# Patient Record
Sex: MALE | Race: BLACK OR AFRICAN AMERICAN | NOT HISPANIC OR LATINO | Employment: OTHER | URBAN - METROPOLITAN AREA
[De-identification: names, ages, dates, MRNs, and addresses within clinical notes are randomized per-mention and may not be internally consistent; named-entity substitution may affect disease eponyms.]

---

## 2019-05-08 PROBLEM — I73.9 PAD (PERIPHERAL ARTERY DISEASE): Status: ACTIVE | Noted: 2019-05-08

## 2019-05-08 PROBLEM — I10 ESSENTIAL HYPERTENSION: Status: ACTIVE | Noted: 2019-05-08

## 2019-05-08 PROBLEM — N40.1 BENIGN LOCALIZED PROSTATIC HYPERPLASIA WITH LOWER URINARY TRACT SYMPTOMS (LUTS): Status: ACTIVE | Noted: 2019-05-08

## 2019-05-08 PROBLEM — I25.10 CORONARY ARTERY DISEASE INVOLVING NATIVE HEART: Status: ACTIVE | Noted: 2019-05-08

## 2019-05-08 PROBLEM — N52.9 ERECTILE DYSFUNCTION: Status: ACTIVE | Noted: 2019-05-08

## 2019-05-08 PROBLEM — F17.210 CIGARETTE SMOKER: Status: ACTIVE | Noted: 2019-05-08

## 2019-05-08 PROBLEM — E78.5 HYPERLIPIDEMIA: Status: ACTIVE | Noted: 2019-05-08

## 2019-05-08 PROBLEM — Z95.1 HISTORY OF CORONARY ARTERY BYPASS GRAFT: Status: ACTIVE | Noted: 2019-05-08

## 2019-11-05 ENCOUNTER — OFFICE VISIT (OUTPATIENT)
Dept: FAMILY MEDICINE | Facility: CLINIC | Age: 69
End: 2019-11-05
Payer: MEDICARE

## 2019-11-05 ENCOUNTER — HOSPITAL ENCOUNTER (OUTPATIENT)
Dept: RADIOLOGY | Facility: HOSPITAL | Age: 69
Discharge: HOME | End: 2019-11-05
Attending: FAMILY MEDICINE
Payer: MEDICARE

## 2019-11-05 VITALS
DIASTOLIC BLOOD PRESSURE: 82 MMHG | HEIGHT: 64 IN | OXYGEN SATURATION: 96 % | SYSTOLIC BLOOD PRESSURE: 138 MMHG | BODY MASS INDEX: 32.93 KG/M2 | WEIGHT: 192.88 LBS | HEART RATE: 60 BPM

## 2019-11-05 DIAGNOSIS — M25.511 CHRONIC PAIN OF BOTH SHOULDERS: ICD-10-CM

## 2019-11-05 DIAGNOSIS — G89.29 CHRONIC PAIN OF BOTH SHOULDERS: ICD-10-CM

## 2019-11-05 DIAGNOSIS — I10 ESSENTIAL HYPERTENSION: Primary | ICD-10-CM

## 2019-11-05 DIAGNOSIS — Z11.4 SCREENING FOR HIV (HUMAN IMMUNODEFICIENCY VIRUS): ICD-10-CM

## 2019-11-05 DIAGNOSIS — I25.810 CORONARY ARTERY DISEASE INVOLVING CORONARY BYPASS GRAFT OF NATIVE HEART WITHOUT ANGINA PECTORIS: ICD-10-CM

## 2019-11-05 DIAGNOSIS — Z11.59 NEED FOR HEPATITIS C SCREENING TEST: ICD-10-CM

## 2019-11-05 DIAGNOSIS — F41.9 ANXIETY: ICD-10-CM

## 2019-11-05 DIAGNOSIS — R06.89 ABNORMAL BREATH SOUNDS: ICD-10-CM

## 2019-11-05 DIAGNOSIS — M25.512 CHRONIC PAIN OF BOTH SHOULDERS: ICD-10-CM

## 2019-11-05 DIAGNOSIS — E78.49 OTHER HYPERLIPIDEMIA: ICD-10-CM

## 2019-11-05 DIAGNOSIS — R60.0 LOCALIZED EDEMA: ICD-10-CM

## 2019-11-05 DIAGNOSIS — F17.200 TOBACCO DEPENDENCE: ICD-10-CM

## 2019-11-05 DIAGNOSIS — D50.8 OTHER IRON DEFICIENCY ANEMIA: ICD-10-CM

## 2019-11-05 DIAGNOSIS — E66.09 CLASS 1 OBESITY DUE TO EXCESS CALORIES WITH SERIOUS COMORBIDITY AND BODY MASS INDEX (BMI) OF 33.0 TO 33.9 IN ADULT: ICD-10-CM

## 2019-11-05 PROCEDURE — 3075F SYST BP GE 130 - 139MM HG: CPT | Mod: CPTII,S$GLB,, | Performed by: FAMILY MEDICINE

## 2019-11-05 PROCEDURE — 1101F PT FALLS ASSESS-DOCD LE1/YR: CPT | Mod: CPTII,S$GLB,, | Performed by: FAMILY MEDICINE

## 2019-11-05 PROCEDURE — 3079F PR MOST RECENT DIASTOLIC BLOOD PRESSURE 80-89 MM HG: ICD-10-PCS | Mod: CPTII,S$GLB,, | Performed by: FAMILY MEDICINE

## 2019-11-05 PROCEDURE — 99999 PR PBB SHADOW E&M-NEW PATIENT-LVL V: ICD-10-PCS | Mod: PBBFAC,,, | Performed by: FAMILY MEDICINE

## 2019-11-05 PROCEDURE — 3079F DIAST BP 80-89 MM HG: CPT | Mod: CPTII,S$GLB,, | Performed by: FAMILY MEDICINE

## 2019-11-05 PROCEDURE — 73030 X-RAY EXAM OF SHOULDER: CPT | Mod: TC,50,FY,PO

## 2019-11-05 PROCEDURE — 3075F PR MOST RECENT SYSTOLIC BLOOD PRESS GE 130-139MM HG: ICD-10-PCS | Mod: CPTII,S$GLB,, | Performed by: FAMILY MEDICINE

## 2019-11-05 PROCEDURE — 99204 PR OFFICE/OUTPT VISIT, NEW, LEVL IV, 45-59 MIN: ICD-10-PCS | Mod: S$GLB,,, | Performed by: FAMILY MEDICINE

## 2019-11-05 PROCEDURE — 71046 XR CHEST PA AND LATERAL: ICD-10-PCS | Mod: 26,,, | Performed by: RADIOLOGY

## 2019-11-05 PROCEDURE — 99999 PR PBB SHADOW E&M-NEW PATIENT-LVL V: CPT | Mod: PBBFAC,,, | Performed by: FAMILY MEDICINE

## 2019-11-05 PROCEDURE — 73030 X-RAY EXAM OF SHOULDER: CPT | Mod: 26,50,, | Performed by: RADIOLOGY

## 2019-11-05 PROCEDURE — 1101F PR PT FALLS ASSESS DOC 0-1 FALLS W/OUT INJ PAST YR: ICD-10-PCS | Mod: CPTII,S$GLB,, | Performed by: FAMILY MEDICINE

## 2019-11-05 PROCEDURE — 73030 XR SHOULDER COMPLETE 2 OR MORE VIEWS BILATERAL: ICD-10-PCS | Mod: 26,50,, | Performed by: RADIOLOGY

## 2019-11-05 PROCEDURE — 71046 X-RAY EXAM CHEST 2 VIEWS: CPT | Mod: TC,FY,PO

## 2019-11-05 PROCEDURE — 71046 X-RAY EXAM CHEST 2 VIEWS: CPT | Mod: 26,,, | Performed by: RADIOLOGY

## 2019-11-05 PROCEDURE — 99204 OFFICE O/P NEW MOD 45 MIN: CPT | Mod: S$GLB,,, | Performed by: FAMILY MEDICINE

## 2019-11-05 RX ORDER — TELMISARTAN 40 MG/1
40 TABLET ORAL DAILY
Qty: 90 TABLET | Refills: 3 | Status: SHIPPED | OUTPATIENT
Start: 2019-11-05 | End: 2020-04-24 | Stop reason: SDUPTHER

## 2019-11-05 RX ORDER — MONTELUKAST SODIUM 10 MG/1
10 TABLET ORAL NIGHTLY
Qty: 30 TABLET | Refills: 0 | Status: SHIPPED | OUTPATIENT
Start: 2019-11-05 | End: 2019-11-05 | Stop reason: SDUPTHER

## 2019-11-05 RX ORDER — ESCITALOPRAM OXALATE 10 MG/1
TABLET ORAL
COMMUNITY
Start: 2019-03-19 | End: 2019-12-31 | Stop reason: SDUPTHER

## 2019-11-05 NOTE — PATIENT INSTRUCTIONS
Eating Heart-Healthy Food: Using the DASH Plan    Eating for your heart doesnt have to be hard or boring. You just need to know how to make healthier choices. The DASH eating plan has been developed to help you do just that. DASH stands for Dietary Approaches to Stop Hypertension. It is a plan that has been proven to be healthier for your heart and to lower your risk for high blood pressure. It can also help lower your risk for cancer, heart disease, osteoporosis, and diabetes.  Choosing from each food group  Choose foods from each of the food groups below each day. Try to get the recommended number of servings for each food group. The serving numbers are based on a diet of 2,000 calories a day. Talk to your doctor if youre unsure about your calorie needs. Along with getting the correct servings, the DASH plan also recommends a sodium intake less than 2,300 mg per day.        Grains  Servings: 6 to 8 a day  A serving is:  · 1 slice bread  · 1 ounce dry cereal  · Half a cup cooked rice, pasta or cereal  Best choices: Whole grains and any grains high in fiber. Vegetables  Servings: 4 to 5 a day  A serving is:  · 1 cup raw leafy vegetable  · Half a cup cut-up raw or cooked vegetable  · Half a cup vegetable juice  Best choices: Fresh or frozen vegetables prepared without added salt or fat.   Fruits  Servings: 4 to 5 a day  A serving is:  · 1 medium fruit  · One-quarter cup dried fruit  · Half a cup fresh, frozen, or canned fruit  · Half a cup of 100% fruit juices  Best choices: A variety of fresh fruits of different colors. Whole fruits are a better choice than fruit juices. Low-fat or fat-free dairy  Servings: 2 to 3 a day  A serving is:  · 1 cup milk  · 1 cup yogurt  · One and a half ounces cheese  Best choices: Skim or 1% milk, low-fat or fat-free yogurt or buttermilk, and low-fat cheeses.         Lean meats, poultry, fish  Servings: 6 or fewer a day  A serving is:  · 1 ounce cooked meats, poultry, or fish  · 1  egg  Best choices: Lean poultry and fish. Trim away visible fat. Broil, grill, roast, or boil instead of frying. Remove skin from poultry before eating. Limit how much red meat you eat.  Nuts, seeds, beans  Servings: 4 to 5 a week  A serving is:  · One-third cup nuts (one and a half ounces)  · 2 tablespoons nut butter or seeds  · Half a cup cooked dry beans or legumes  Best choices: Dry roasted nuts with no salt added, lentils, kidney beans, garbanzo beans, and whole angela beans.   Fats and oils  Servings: 2 to 3 a day  A serving is:  · 1 teaspoon vegetable oil  · 1 teaspoon soft margarine  · 1 tablespoon mayonnaise  · 2 tablespoons salad dressing  Best choices: Nut and vegetable oils (nontropical vegetable oils), such as olive and canola oil. Sweets  Servings: 5 a week or fewer  A serving is:  · 1 tablespoon sugar, maple syrup, or honey  · 1 tablespoon jam or jelly  · 1 half-ounce jelly beans (about 15)  · 1 cup lemonade  Best choices: Dried fruit can be a satisfying sweet. Choose low-fat sweets. And watch your serving sizes!      For more on the DASH eating plan, visit:  www.nhlbi.nih.gov/health/health-topics/topics/dash   Date Last Reviewed: 6/1/2016  © 8345-0478 [employer]. [address] [place] [address], [place] [address]. All rights reserved. This information is not intended as a substitute for professional medical care. Always follow your healthcare professional's instructions.

## 2019-11-06 DIAGNOSIS — M25.512 CHRONIC LEFT SHOULDER PAIN: Primary | ICD-10-CM

## 2019-11-06 DIAGNOSIS — G89.29 CHRONIC LEFT SHOULDER PAIN: Primary | ICD-10-CM

## 2019-11-06 DIAGNOSIS — R76.8 HEPATITIS C ANTIBODY TEST POSITIVE: Primary | ICD-10-CM

## 2019-11-06 NOTE — PROGRESS NOTES
Refill Authorization Note     is requesting a refill authorization.    Brief assessment and rationale for refill: DEFER: new start  Name and strength of medication: montelukast (SINGULAIR) 10 mg tablet       Medication Therapy Plan: new start; defer to you    Medication reconciliation completed: No                         Comments:  Requested Prescriptions   Pending Prescriptions Disp Refills    montelukast (SINGULAIR) 10 mg tablet [Pharmacy Med Name: MONTELUKAST 10MG TABLETS] 90 tablet 0     Sig: TAKE 1 TABLET(10 MG) BY MOUTH EVERY EVENING       Pulmonology:  Leukotriene Inhibitors Failed - 11/5/2019  4:21 PM        Failed - An appropriate indication is on the problem list     Allergic Rhinitis  Sinusitis  Seasonal Allergies   Asthma              Passed - Patient is at least 18 years old        Passed - Office visit in past 12 months or future 90 days     Recent Outpatient Visits            Yesterday Essential hypertension    [facility] Medicine Kalani Velázquez MD    1 month ago Essential hypertension    [facility] Goldy Wade MD    5 months ago Pain in both lower extremities    [facility] - Cardiology Rmoeo Zhu MD    6 months ago Essential hypertension    [facility] Goldy Wade MD          Future Appointments              In 1 week [facility]    In 2 weeks Teagan Rivera MD [facility], [facility]    In 3 months Kalani Velázquez MD [facility]

## 2019-11-07 ENCOUNTER — TELEPHONE (OUTPATIENT)
Dept: FAMILY MEDICINE | Facility: CLINIC | Age: 69
End: 2019-11-07

## 2019-11-07 RX ORDER — MONTELUKAST SODIUM 10 MG/1
TABLET ORAL
Qty: 90 TABLET | Refills: 0 | Status: SHIPPED | OUTPATIENT
Start: 2019-11-07 | End: 2020-06-08 | Stop reason: SDUPTHER

## 2019-11-07 RX ORDER — HYDROCODONE BITARTRATE AND ACETAMINOPHEN 5; 325 MG/1; MG/1
1 TABLET ORAL EVERY 8 HOURS PRN
Qty: 21 TABLET | Refills: 0 | Status: SHIPPED | OUTPATIENT
Start: 2019-11-07 | End: 2019-12-31 | Stop reason: SDUPTHER

## 2019-11-07 NOTE — TELEPHONE ENCOUNTER
I faxed prescription to the pharmacy for short-term hydrocodone, I only can prescribe for 7 days, make sure to follow up with the specialist on 11/13/2019 for the shoulder.  Thank you

## 2019-11-07 NOTE — TELEPHONE ENCOUNTER
The hepatitis C results are not confirmatory until we get the 2nd test.  Can he come back for the blood work tomorrow.  Yes,  I can fax a prescription to the pharmacy for hydrocodone for pain, also send the patient to see the specialist for that.  Can you ask if he take hydrocodone before and if he had any problems taking it?  Thank you

## 2019-11-07 NOTE — TELEPHONE ENCOUNTER
----- Message from Mila Vergara sent at 11/7/2019  1:58 PM CST -----  Type: Needs Medical Advice    Who Called:  Patient   Best Call Back Number: [phone]  Additional Information: patient is concerned about his medications reacting with each other, telmisartan (MICARDIS) 40 MG Tab, please contact to advise.     Thank you

## 2019-11-07 NOTE — TELEPHONE ENCOUNTER
Spoke to pt. Pt states he is very upset about his hepatitis C results. Pt states he feels like the world is down to get him, states that he doesn't do drugs or have sex and states that he doesn't know how he is hepatitis C. Pt states he came in for back and shoulder pain and states he is in a lot of pain and would like a prescription for pain. Please advise.

## 2019-11-07 NOTE — TELEPHONE ENCOUNTER
Please see the following assessment. This refill request still requires some action on your part. Montelukast was recently initiated. Pended 90-day supply. Defer to you. Thank you.

## 2019-11-07 NOTE — TELEPHONE ENCOUNTER
The hepatitis C was positive, we have make sure that this is not a false result, I place orders for quantification of the virus, the orders are in place, the patient is to return to the office to check on that.  Thank you

## 2019-11-07 NOTE — TELEPHONE ENCOUNTER
Contacted patient and informed that results are not concrete until 2nd test is done. Patient will complete labs on 11/13 as he has other appts on that day. Patient states he has no known issues with taking hydrocodone and would like it sent to his pharmacy.

## 2019-11-07 NOTE — TELEPHONE ENCOUNTER
----- Message from Tricia Reilly sent at 11/7/2019  8:59 AM CST -----  Contact: patient  Type: Needs Medical Advice    Who Called:  patient  Best Call Back Number: [phone]  Additional Information: patient is very upset because he received a call saying that he has Hepatitis C. He states that he never had anything like that. Please give call back.

## 2019-11-08 NOTE — PROGRESS NOTES
Subjective:       Patient ID: Alexis Medrano Jr. is a 69 y.o. male.    Chief Complaint: Establish Care (arthritis, ringing in ears)    Hypertension   This is a chronic problem. The current episode started more than 1 year ago. The problem is unchanged. The problem is controlled. Associated symptoms include anxiety and peripheral edema. Pertinent negatives include no blurred vision, chest pain, headaches, malaise/fatigue, neck pain, orthopnea, palpitations, PND, shortness of breath or sweats. There are no associated agents to hypertension. Risk factors for coronary artery disease include dyslipidemia, male gender, obesity, sedentary lifestyle and smoking/tobacco exposure. Past treatments include nothing. The current treatment provides no improvement. Compliance problems include diet and exercise.  Hypertensive end-organ damage includes CAD/MI. There is no history of angina, kidney disease, CVA, heart failure, left ventricular hypertrophy or retinopathy. There is no history of chronic renal disease.   Hyperlipidemia   This is a chronic problem. The current episode started more than 1 year ago. The problem is uncontrolled. Recent lipid tests were reviewed and are high. Exacerbating diseases include obesity. He has no history of chronic renal disease, diabetes, hypothyroidism, liver disease or nephrotic syndrome. Factors aggravating his hyperlipidemia include fatty foods. Pertinent negatives include no chest pain, focal weakness, leg pain, myalgias or shortness of breath. Current antihyperlipidemic treatment includes statins. The current treatment provides moderate improvement of lipids. Compliance problems include adherence to diet and adherence to exercise.  Risk factors for coronary artery disease include hypertension, male sex, dyslipidemia, family history, a sedentary lifestyle, stress and obesity.   Shoulder Pain    Pain location: Bilateral shoulders. This is a chronic problem. The current episode started more than 1  year ago. The problem occurs constantly. The problem has been gradually worsening. The pain is severe. Associated symptoms include a limited range of motion and stiffness. Pertinent negatives include no fever, headaches, inability to bear weight, itching, joint locking, joint swelling, numbness, tingling or visual symptoms. The symptoms are aggravated by activity. He has tried acetaminophen and NSAIDS for the symptoms. The treatment provided mild relief. There is no history of diabetes.     The patient also has anxiety which he takes Lexapro for, he denies any side effects of the medication, stated that in the past he was taking diazepam with good relief of the symptoms but he was taking off of this medication.  Also he was taking hydrocodone for pain but he stop taking this medication also.  Upon review of the last blood work, the patient has anemia.  The patient also takes iron tablets daily.  The patient smokes daily, he does want to quit smoking.    Past medical history, past social history, past Family history, past surgical history was reviewed and discussed with the patient.    Review of Systems   Constitutional: Negative for activity change, appetite change, chills, fever and malaise/fatigue.   HENT: Positive for congestion and postnasal drip. Negative for ear discharge.    Eyes: Negative for blurred vision, discharge and itching.   Respiratory: Negative for choking, chest tightness and shortness of breath.    Cardiovascular: Positive for leg swelling. Negative for chest pain, palpitations, orthopnea and PND.   Gastrointestinal: Negative for abdominal distention and abdominal pain.   Endocrine: Negative for cold intolerance and heat intolerance.   Genitourinary: Negative for dysuria and flank pain.   Musculoskeletal: Positive for arthralgias and stiffness. Negative for back pain, myalgias and neck pain.   Skin: Negative for itching, pallor and rash.   Allergic/Immunologic: Negative for environmental  allergies and food allergies.   Neurological: Negative for dizziness, tingling, focal weakness, facial asymmetry, numbness and headaches.   Hematological: Negative for adenopathy. Does not bruise/bleed easily.   Psychiatric/Behavioral: Positive for dysphoric mood. Negative for agitation, confusion, decreased concentration and sleep disturbance. The patient is nervous/anxious.        Objective:      Physical Exam   Constitutional: He appears well-developed and well-nourished. No distress.   HENT:   Head: Normocephalic and atraumatic.   Right Ear: External ear normal.   Left Ear: External ear normal.   Nose: Nose normal.   Mouth/Throat: No oropharyngeal exudate.   Eyes: Pupils are equal, round, and reactive to light. Left eye exhibits no discharge. No scleral icterus.   Neck: Normal range of motion. Neck supple. No tracheal deviation present. No thyromegaly present.   Cardiovascular: Normal rate, regular rhythm and normal heart sounds. Exam reveals no friction rub.   No murmur heard.  Pulmonary/Chest: Effort normal. No respiratory distress. He has no wheezes. He has rales (On bilateral lower bases).   Abdominal: Soft. Bowel sounds are normal. There is no tenderness. There is no guarding.   Musculoskeletal: He exhibits tenderness (Tenderness to palpation on bilateral shoulders with decreased range of motion). He exhibits no edema.   Neurological: No cranial nerve deficit. Coordination normal.   Skin: Skin is warm and dry. No rash noted. He is not diaphoretic. No erythema. No pallor.   Psychiatric: He has a normal mood and affect. His behavior is normal. Judgment and thought content normal.   Nursing note and vitals reviewed.      Assessment:       1. Essential hypertension    2. Localized edema    3. Tobacco dependence    4. Other iron deficiency anemia    5. Need for hepatitis C screening test    6. Other hyperlipidemia    7. Coronary artery disease involving coronary bypass graft of native heart without angina  pectoris    8. Chronic pain of both shoulders    9. Screening for HIV (human immunodeficiency virus)    10. Class 1 obesity due to excess calories with serious comorbidity and body mass index (BMI) of 33.0 to 33.9 in adult    11. Abnormal breath sounds        Plan:       Essential hypertension:  Well controlled  -     Comprehensive metabolic panel; Future; Expected date: 11/05/2019  -     Lipid panel; Future; Expected date: 11/05/2019  -     telmisartan (MICARDIS) 40 MG Tab; Take 1 tablet (40 mg total) by mouth once daily.  Dispense: 90 tablet; Refill: 3    Localized edema:  New problem workup needed    Tobacco dependence:  Worsening  -     Ambulatory referral to Smoking Cessation Program    Other iron deficiency anemia:  New problem workup needed  -     CBC auto differential; Future; Expected date: 11/05/2019  -     Iron and TIBC; Future; Expected date: 11/05/2019  -     Ferritin; Future; Expected date: 11/05/2019    Need for hepatitis C screening test  -     Hepatitis C antibody; Future; Expected date: 11/05/2019    Other hyperlipidemia:  New problem workup needed    Coronary artery disease involving coronary bypass graft of native heart without angina pectoris:  New problem workup needed  -     Ambulatory referral to Cardiology    Chronic pain of both shoulders:  New problem, workup needed  -     X-Ray Shoulder Complete Bilateral; Future; Expected date: 11/05/2019  -     Sedimentation rate; Future; Expected date: 11/05/2019    Screening for HIV (human immunodeficiency virus)  -     HIV 1/2 Ag/Ab (4th Gen); Future; Expected date: 11/05/2019    Class 1 obesity due to excess calories with serious comorbidity and body mass index (BMI) of 33.0 to 33.9 in adult:  New problem, workup needed    Abnormal breath sounds:  New problem workup needed  -     X-Ray Chest PA And Lateral; Future; Expected date: 11/05/2019    Anxiety:  New problem next visit workup    Will call the patient after have the results of the test, the  patient was advised to lose weight, avoid fried foods, red meat and processed starches, eat more vegetables, drink plenty water.  I was recommended the patient against taking diazepam for anxiety because of the risk of addiction started g of the medication, continue with Lexapro.  Will refer the patient to the cardiologist for further assessment secondary to coronary artery disease and also to Dr. Roger for shoulder pain.  Hydrocodone was prescribed for the patient for 7 days.The patient's BMI has been recorded in the chart. The patient has been provided educational materials regarding the benefits of attaining and maintaining a normal weight. We will continue to address and follow this issue during follow up visits.Patient agreed with assessment and plan. Patient verbalized understanding.

## 2019-11-11 ENCOUNTER — TELEPHONE (OUTPATIENT)
Dept: FAMILY MEDICINE | Facility: CLINIC | Age: 69
End: 2019-11-11

## 2019-11-11 NOTE — TELEPHONE ENCOUNTER
Please see previous note and advise. Do not see any documentation about discontinuing any medications.

## 2019-11-11 NOTE — TELEPHONE ENCOUNTER
----- Message from Clotilde Cruz sent at 11/11/2019  3:36 PM CST -----  Contact: Opal Martin  Type: Needs Medical Advice    Who Called:  Opal Marie Call Back Number:     Additional Information: Requesting a call back from Nurse, regarding questions about which medications  wants pt to stop taking pt is confused,please call pharmacy back with advice

## 2019-11-12 ENCOUNTER — TELEPHONE (OUTPATIENT)
Dept: FAMILY MEDICINE | Facility: CLINIC | Age: 69
End: 2019-11-12

## 2019-11-12 NOTE — TELEPHONE ENCOUNTER
I started Micardis for his blood pressure, I stopped lisinopril because is link with lung cancer.  Thank you.

## 2019-11-12 NOTE — TELEPHONE ENCOUNTER
----- Message from Cornelia Hanna sent at 11/12/2019 10:41 AM CST -----  Contact: Ria  from [facility] pharmacy   Pharmacy need to speak to nurse regarding medication telmisartan (MICARDIS) 40 MG Tab    Pharmacy need to discuss patient concerns regarding  if patient  should be taking above medications with other medications he is currently on       [id number] [address] [facility]  [address]  [facility] [place] [address]  Phone: [phone] Fax: [phone]

## 2019-11-13 ENCOUNTER — INITIAL CONSULT (OUTPATIENT)
Dept: PHYSICAL MEDICINE AND REHAB | Facility: CLINIC | Age: 69
End: 2019-11-13
Payer: MEDICARE

## 2019-11-13 ENCOUNTER — OFFICE VISIT (OUTPATIENT)
Dept: UROLOGY | Facility: CLINIC | Age: 69
End: 2019-11-13
Payer: MEDICARE

## 2019-11-13 ENCOUNTER — LAB VISIT (OUTPATIENT)
Dept: LAB | Facility: HOSPITAL | Age: 69
End: 2019-11-13
Attending: FAMILY MEDICINE
Payer: MEDICARE

## 2019-11-13 VITALS
BODY MASS INDEX: 32.93 KG/M2 | WEIGHT: 192.88 LBS | SYSTOLIC BLOOD PRESSURE: 130 MMHG | HEART RATE: 61 BPM | DIASTOLIC BLOOD PRESSURE: 75 MMHG | HEIGHT: 64 IN | RESPIRATION RATE: 18 BRPM

## 2019-11-13 VITALS — BODY MASS INDEX: 32.78 KG/M2 | WEIGHT: 192 LBS | HEIGHT: 64 IN

## 2019-11-13 DIAGNOSIS — N52.9 IMPOTENCE: Primary | ICD-10-CM

## 2019-11-13 DIAGNOSIS — Z12.5 SCREENING FOR PROSTATE CANCER: ICD-10-CM

## 2019-11-13 DIAGNOSIS — R76.8 HEPATITIS C ANTIBODY TEST POSITIVE: ICD-10-CM

## 2019-11-13 DIAGNOSIS — N40.0 BPH WITHOUT URINARY OBSTRUCTION: ICD-10-CM

## 2019-11-13 DIAGNOSIS — M67.919 TENDINOPATHY OF ROTATOR CUFF, UNSPECIFIED LATERALITY: Primary | ICD-10-CM

## 2019-11-13 PROCEDURE — 99204 PR OFFICE/OUTPT VISIT, NEW, LEVL IV, 45-59 MIN: ICD-10-PCS | Mod: S$GLB,,, | Performed by: UROLOGY

## 2019-11-13 PROCEDURE — 87522 HEPATITIS C REVRS TRNSCRPJ: CPT

## 2019-11-13 PROCEDURE — 3074F SYST BP LT 130 MM HG: CPT | Mod: CPTII,S$GLB,, | Performed by: PHYSICAL MEDICINE & REHABILITATION

## 2019-11-13 PROCEDURE — 3078F DIAST BP <80 MM HG: CPT | Mod: CPTII,S$GLB,, | Performed by: UROLOGY

## 2019-11-13 PROCEDURE — 1101F PT FALLS ASSESS-DOCD LE1/YR: CPT | Mod: CPTII,S$GLB,, | Performed by: UROLOGY

## 2019-11-13 PROCEDURE — 1101F PR PT FALLS ASSESS DOC 0-1 FALLS W/OUT INJ PAST YR: ICD-10-PCS | Mod: CPTII,S$GLB,, | Performed by: PHYSICAL MEDICINE & REHABILITATION

## 2019-11-13 PROCEDURE — 99999 PR PBB SHADOW E&M-EST. PATIENT-LVL III: ICD-10-PCS | Mod: PBBFAC,,, | Performed by: UROLOGY

## 2019-11-13 PROCEDURE — 3074F PR MOST RECENT SYSTOLIC BLOOD PRESSURE < 130 MM HG: ICD-10-PCS | Mod: CPTII,S$GLB,, | Performed by: PHYSICAL MEDICINE & REHABILITATION

## 2019-11-13 PROCEDURE — 99204 OFFICE O/P NEW MOD 45 MIN: CPT | Mod: S$GLB,,, | Performed by: UROLOGY

## 2019-11-13 PROCEDURE — 36415 COLL VENOUS BLD VENIPUNCTURE: CPT | Mod: PO

## 2019-11-13 PROCEDURE — 1101F PT FALLS ASSESS-DOCD LE1/YR: CPT | Mod: CPTII,S$GLB,, | Performed by: PHYSICAL MEDICINE & REHABILITATION

## 2019-11-13 PROCEDURE — 99999 PR PBB SHADOW E&M-EST. PATIENT-LVL III: CPT | Mod: PBBFAC,,, | Performed by: UROLOGY

## 2019-11-13 PROCEDURE — 3078F DIAST BP <80 MM HG: CPT | Mod: CPTII,S$GLB,, | Performed by: PHYSICAL MEDICINE & REHABILITATION

## 2019-11-13 PROCEDURE — 99999 PR PBB SHADOW E&M-EST. PATIENT-LVL II: CPT | Mod: PBBFAC,,, | Performed by: PHYSICAL MEDICINE & REHABILITATION

## 2019-11-13 PROCEDURE — 99204 OFFICE O/P NEW MOD 45 MIN: CPT | Mod: S$GLB,,, | Performed by: PHYSICAL MEDICINE & REHABILITATION

## 2019-11-13 PROCEDURE — 1101F PR PT FALLS ASSESS DOC 0-1 FALLS W/OUT INJ PAST YR: ICD-10-PCS | Mod: CPTII,S$GLB,, | Performed by: UROLOGY

## 2019-11-13 PROCEDURE — 3078F PR MOST RECENT DIASTOLIC BLOOD PRESSURE < 80 MM HG: ICD-10-PCS | Mod: CPTII,S$GLB,, | Performed by: PHYSICAL MEDICINE & REHABILITATION

## 2019-11-13 PROCEDURE — 99999 PR PBB SHADOW E&M-EST. PATIENT-LVL II: ICD-10-PCS | Mod: PBBFAC,,, | Performed by: PHYSICAL MEDICINE & REHABILITATION

## 2019-11-13 PROCEDURE — 99204 PR OFFICE/OUTPT VISIT, NEW, LEVL IV, 45-59 MIN: ICD-10-PCS | Mod: S$GLB,,, | Performed by: PHYSICAL MEDICINE & REHABILITATION

## 2019-11-13 PROCEDURE — 3075F PR MOST RECENT SYSTOLIC BLOOD PRESS GE 130-139MM HG: ICD-10-PCS | Mod: CPTII,S$GLB,, | Performed by: UROLOGY

## 2019-11-13 PROCEDURE — 3078F PR MOST RECENT DIASTOLIC BLOOD PRESSURE < 80 MM HG: ICD-10-PCS | Mod: CPTII,S$GLB,, | Performed by: UROLOGY

## 2019-11-13 PROCEDURE — 3075F SYST BP GE 130 - 139MM HG: CPT | Mod: CPTII,S$GLB,, | Performed by: UROLOGY

## 2019-11-13 RX ORDER — SIMVASTATIN 40 MG/1
TABLET, FILM COATED ORAL
Refills: 6 | COMMUNITY
Start: 2019-10-29 | End: 2019-11-20 | Stop reason: ALTCHOICE

## 2019-11-13 RX ORDER — SILDENAFIL 100 MG/1
100 TABLET, FILM COATED ORAL DAILY PRN
Qty: 6 TABLET | Refills: 11 | Status: SHIPPED | OUTPATIENT
Start: 2019-11-13 | End: 2021-02-05 | Stop reason: SDUPTHER
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
## 2019-11-13 NOTE — PROGRESS NOTES
Subjective:       Patient ID: Alexis Medrano Jr. is a 69 y.o. male.    Chief Complaint: Sexual Dysfunction    HPI     69-year-old with ED.  This has been a long-term problem.  He has tried Viagra in the past.  He says it is ineffective.  He has also been on daily Cialis in the past which has also been ineffective.  He has no bothersome urinary symptoms.  He denies hematuria and dysuria.  He is taking finasteride and has for some time.  The details are unknown.  He has no family history of prostate cancer.  His last PSA was 0.3 ten years ago.  We discussed all treatment options available for erectile dysfunction.  I recommend he try Viagra again.    Component PSA, SCREEN   Latest Ref Rng & Units 0 - 4.0 ng/ml   8/6/2007 0.3   3/8/2005 0.5       Past Medical History:   Diagnosis Date    Depression     Hypertension      Past Surgical History:   Procedure Laterality Date    CORONARY ARTERY BYPASS GRAFT         Current Outpatient Medications:     aspirin (ECOTRIN) 81 MG EC tablet, Take 81 mg by mouth once daily., Disp: , Rfl:     atenolol (TENORMIN) 50 MG tablet, Take 50 mg by mouth once daily., Disp: , Rfl:     ferrous sulfate (FEOSOL) 325 mg (65 mg iron) Tab tablet, Take 1 tablet by mouth once daily., Disp: , Rfl: 0    finasteride (PROSCAR) 5 mg tablet, TAKE 1 TABLET BY MOUTH DAILY, Disp: 30 tablet, Rfl: 6    furosemide (LASIX ORAL), Take 40 mg by mouth once daily. , Disp: , Rfl:     HYDROcodone-acetaminophen (NORCO) 5-325 mg per tablet, Take 1 tablet by mouth every 8 (eight) hours as needed for Pain., Disp: 21 tablet, Rfl: 0    montelukast (SINGULAIR) 10 mg tablet, TAKE 1 TABLET(10 MG) BY MOUTH EVERY EVENING, Disp: 90 tablet, Rfl: 0    multivitamin (ONE DAILY MULTIVITAMIN) per tablet, Take 1 tablet by mouth once daily., Disp: , Rfl:     potassium chloride SA (K-DUR,KLOR-CON) 20 MEQ tablet, Take 1 tablet by mouth once daily., Disp: , Rfl:     telmisartan (MICARDIS) 40 MG Tab, Take 1 tablet (40 mg total) by  mouth once daily., Disp: 90 tablet, Rfl: 3    escitalopram oxalate (LEXAPRO) 10 MG tablet, TAKE 1 TABLET BY MOUTH EVERY DAY, Disp: , Rfl:     sildenafil (VIAGRA) 100 MG tablet, Take 1 tablet (100 mg total) by mouth daily as needed., Disp: 6 tablet, Rfl: 11      Review of Systems   Constitutional: Negative for fever.   Eyes: Negative for visual disturbance.   Respiratory: Negative for shortness of breath.    Cardiovascular: Negative for chest pain.   Gastrointestinal: Negative for nausea and vomiting.   Genitourinary: Negative for dysuria and hematuria.   Musculoskeletal: Negative for gait problem.   Skin: Negative for rash.   Neurological: Negative for seizures.   Psychiatric/Behavioral: Negative for confusion.       Objective:      Physical Exam   Constitutional: He is oriented to person, place, and time. He appears well-developed and well-nourished.   HENT:   Head: Normocephalic and atraumatic.   Eyes: Conjunctivae are normal.   Cardiovascular: Normal rate.   Pulmonary/Chest: Effort normal.   Abdominal: Soft. Hernia confirmed negative in the right inguinal area and confirmed negative in the left inguinal area.   Genitourinary: Testes normal and penis normal. Rectal exam shows no mass and anal tone normal. Prostate is enlarged (40g, s/s/a). Prostate is not tender.   Musculoskeletal: Normal range of motion. He exhibits no edema.   Neurological: He is alert and oriented to person, place, and time.   Skin: Skin is warm and dry. No rash noted.   Psychiatric: He has a normal mood and affect.   Vitals reviewed.      Assessment:       1. Impotence    2. Screening for prostate cancer    3. BPH without urinary obstruction        Plan:       Impotence    Screening for prostate cancer  -     PSA, Screening; Future; Expected date: 11/13/2019    BPH without urinary obstruction    Other orders  -     sildenafil (VIAGRA) 100 MG tablet; Take 1 tablet (100 mg total) by mouth daily as needed.  Dispense: 6 tablet; Refill: 11

## 2019-11-13 NOTE — LETTER
November 13, 2019      Kalani Velázquez MD  [address] [facility] [address] [place] [address]           [facility]  [address] [facility] [address] [place] [address]  Phone: [phone]  Fax: [phone]          Patient: Alexis Medrano Jr.   MR Number: [id number]   YOB: 1950   Date of Visit: 11/13/2019       Dear Dr. Kalani Velázquez:    Thank you for referring Alexis Medrano to me for evaluation. Attached you will find relevant portions of my assessment and plan of care.    If you have questions, please do not hesitate to call me. I look forward to following Alexis Medrano along with you.    Sincerely,    Dominguez Roger MD    Enclosure  CC:  No Recipients    If you would like to receive this communication electronically, please contact [email or web address] or [phone] to request more information on [employer] Link access.    For providers and/or their staff who would like to refer a patient to [facility], please contact us through our one-stop-shop provider referral line, [facility], at [phone].    If you feel you have received this communication in error or would no longer like to receive these types of communications, please e-mail [email or web address]

## 2019-11-15 LAB
HCV RNA SERPL NAA+PROBE-LOG IU: <1.08 LOG (10) IU/ML
HCV RNA SERPL QL NAA+PROBE: NOT DETECTED IU/ML
HCV RNA SPEC NAA+PROBE-ACNC: <12 IU/ML

## 2019-11-18 RX ORDER — HYDROCODONE BITARTRATE AND ACETAMINOPHEN 5; 325 MG/1; MG/1
1 TABLET ORAL EVERY 8 HOURS PRN
Qty: 21 TABLET | Refills: 0 | Status: CANCELLED | OUTPATIENT
Start: 2019-11-18

## 2019-11-18 NOTE — TELEPHONE ENCOUNTER
The medication was only for short term, I refer the patient for further assessment on the joint pain. I cannot prescribe the medication chronically.  Thank you

## 2019-11-18 NOTE — TELEPHONE ENCOUNTER
Spoke w/ pt. Req refill on med. States is currently out. Taking med every 8 hrs. Please advise on refill.

## 2019-11-18 NOTE — TELEPHONE ENCOUNTER
----- Message from Jenni Kan sent at 11/18/2019  4:39 PM CST -----  Type:  RX Refill Request    Who Called:  Patient   Refill or New Rx:  Refill   RX Name and Strength:  HYDROcodone-acetaminophen (NORCO) 5-325 mg per tablet  How is the patient currently taking it? (ex. 1XDay):  3 day   Is this a 30 day or 90 day RX:  7 day supply   Preferred Pharmacy with phone number:    [id number] - [address] [address]  Phone: [phone] Fax: [phone]    Local or Mail Order: local   Ordering Provider:  Melania Marie Call Back Number:  [phone]  Additional Information: susan

## 2019-11-19 NOTE — TELEPHONE ENCOUNTER
The patient already went to see the physical medicine specialist Dr. Roger, he recommended an injection on the area, does he wants to come back to see him or he wants to see an orthopedic specialist.  Please let me know.  Thank you

## 2019-11-20 ENCOUNTER — OFFICE VISIT (OUTPATIENT)
Dept: CARDIOLOGY | Facility: CLINIC | Age: 69
End: 2019-11-20
Payer: MEDICARE

## 2019-11-20 VITALS
HEIGHT: 64 IN | HEART RATE: 59 BPM | SYSTOLIC BLOOD PRESSURE: 110 MMHG | BODY MASS INDEX: 32.97 KG/M2 | WEIGHT: 193.13 LBS | DIASTOLIC BLOOD PRESSURE: 66 MMHG

## 2019-11-20 DIAGNOSIS — I45.10 RBBB: ICD-10-CM

## 2019-11-20 DIAGNOSIS — R01.1 UNDIAGNOSED CARDIAC MURMURS: ICD-10-CM

## 2019-11-20 DIAGNOSIS — E78.00 HYPERCHOLESTEROLEMIA: ICD-10-CM

## 2019-11-20 DIAGNOSIS — Z71.6 ENCOUNTER FOR TOBACCO USE CESSATION COUNSELING: ICD-10-CM

## 2019-11-20 DIAGNOSIS — R09.89 BRUIT OF RIGHT CAROTID ARTERY: ICD-10-CM

## 2019-11-20 DIAGNOSIS — I10 ESSENTIAL HYPERTENSION: ICD-10-CM

## 2019-11-20 DIAGNOSIS — E66.9 OBESITY, CLASS I, BMI 30-34.9: ICD-10-CM

## 2019-11-20 DIAGNOSIS — I25.708 CORONARY ARTERY DISEASE INVOLVING CORONARY BYPASS GRAFT OF NATIVE HEART WITH OTHER FORMS OF ANGINA PECTORIS: Primary | ICD-10-CM

## 2019-11-20 PROBLEM — I25.810 CORONARY ARTERY DISEASE INVOLVING CORONARY BYPASS GRAFT OF NATIVE HEART: Status: ACTIVE | Noted: 2019-11-20

## 2019-11-20 PROBLEM — E66.811 OBESITY, CLASS I, BMI 30-34.9: Status: ACTIVE | Noted: 2019-11-20

## 2019-11-20 PROBLEM — E78.5 HYPERLIPIDEMIA: Status: RESOLVED | Noted: 2019-05-08 | Resolved: 2019-11-20

## 2019-11-20 PROCEDURE — 1159F PR MEDICATION LIST DOCUMENTED IN MEDICAL RECORD: ICD-10-PCS | Mod: S$GLB,,, | Performed by: INTERNAL MEDICINE

## 2019-11-20 PROCEDURE — 3078F PR MOST RECENT DIASTOLIC BLOOD PRESSURE < 80 MM HG: ICD-10-PCS | Mod: CPTII,S$GLB,, | Performed by: INTERNAL MEDICINE

## 2019-11-20 PROCEDURE — 1101F PR PT FALLS ASSESS DOC 0-1 FALLS W/OUT INJ PAST YR: ICD-10-PCS | Mod: CPTII,S$GLB,, | Performed by: INTERNAL MEDICINE

## 2019-11-20 PROCEDURE — 3074F PR MOST RECENT SYSTOLIC BLOOD PRESSURE < 130 MM HG: ICD-10-PCS | Mod: CPTII,S$GLB,, | Performed by: INTERNAL MEDICINE

## 2019-11-20 PROCEDURE — 99204 PR OFFICE/OUTPT VISIT, NEW, LEVL IV, 45-59 MIN: ICD-10-PCS | Mod: S$GLB,,, | Performed by: INTERNAL MEDICINE

## 2019-11-20 PROCEDURE — 1101F PT FALLS ASSESS-DOCD LE1/YR: CPT | Mod: CPTII,S$GLB,, | Performed by: INTERNAL MEDICINE

## 2019-11-20 PROCEDURE — 1159F MED LIST DOCD IN RCRD: CPT | Mod: S$GLB,,, | Performed by: INTERNAL MEDICINE

## 2019-11-20 PROCEDURE — 1125F AMNT PAIN NOTED PAIN PRSNT: CPT | Mod: S$GLB,,, | Performed by: INTERNAL MEDICINE

## 2019-11-20 PROCEDURE — 99204 OFFICE O/P NEW MOD 45 MIN: CPT | Mod: S$GLB,,, | Performed by: INTERNAL MEDICINE

## 2019-11-20 PROCEDURE — 1125F PR PAIN SEVERITY QUANTIFIED, PAIN PRESENT: ICD-10-PCS | Mod: S$GLB,,, | Performed by: INTERNAL MEDICINE

## 2019-11-20 PROCEDURE — 3078F DIAST BP <80 MM HG: CPT | Mod: CPTII,S$GLB,, | Performed by: INTERNAL MEDICINE

## 2019-11-20 PROCEDURE — 3074F SYST BP LT 130 MM HG: CPT | Mod: CPTII,S$GLB,, | Performed by: INTERNAL MEDICINE

## 2019-11-20 RX ORDER — ROSUVASTATIN CALCIUM 20 MG/1
20 TABLET, COATED ORAL DAILY
Qty: 90 TABLET | Refills: 0 | Status: SHIPPED | OUTPATIENT
Start: 2019-11-20 | End: 2020-01-28

## 2019-11-20 NOTE — LETTER
November 22, 2019      Kalani Velázquez MD  [address] [facility] [address]  [facility] [address]           [facility]  [facility] [address]  Phone: [phone]  Fax: [phone]          Patient: Alexis Medrano Jr.   MR Number: [id number]   YOB: 1950   Date of Visit: 11/20/2019       Dear Dr. Kalani Velázquez:    Thank you for referring Alexis Medrano to me for evaluation. Attached you will find relevant portions of my assessment and plan of care.    If you have questions, please do not hesitate to call me. I look forward to following Alexis Medrano along with you.    Sincerely,    Teagan Rivera MD    Enclosure  CC:  No Recipients    If you would like to receive this communication electronically, please contact [email or web address] or [phone] to request more information on [employer] Link access.    For providers and/or their staff who would like to refer a patient to [facility], please contact us through our one-stop-shop provider referral line, [facility], at [phone].    If you feel you have received this communication in error or would no longer like to receive these types of communications, please e-mail [email or web address]

## 2019-11-20 NOTE — PROGRESS NOTES
Subjective:    Patient ID:  Alexis Medrano Jr. is a 69 y.o. male who presents for Coronary Artery Disease (2009 quad bypass ); Hyperlipidemia; and Hypertension (new b/p meds working better)        Coronary Artery Disease   Presents for initial visit. The disease course has been stable. Pertinent negatives include no chest pain, dizziness, leg swelling, palpitations or shortness of breath. Risk factors include hyperlipidemia. Past treatments include beta blockers, aspirin and statins. His past medical history is significant for angina pectoris. There is no history of past myocardial infarction. Past surgical history includes CABG. (CABG 2009, DR ROSENBERG AND DR RUVALCABA)   Hypertension   This is a chronic problem. The current episode started more than 1 year ago. The problem is controlled. Pertinent negatives include no blurred vision, chest pain, malaise/fatigue, orthopnea, palpitations, PND or shortness of breath. Risk factors for coronary artery disease include male gender and obesity. Past treatments include diuretics, beta blockers and angiotensin blockers.   Hyperlipidemia   This is a chronic problem. The current episode started more than 1 year ago. The problem is controlled. Factors aggravating his hyperlipidemia include beta blockers. Pertinent negatives include no chest pain, focal weakness or shortness of breath. Current antihyperlipidemic treatment includes statins. Risk factors for coronary artery disease include dyslipidemia.       NEW PT EVALUATION, CAD, REFERRED BY DR ZAMORA, RECENT ARTERIAL DOPPLER MILD PLAQUE, DR FERNANDEZ,NOT ACTIVE, LAST LDL 91, EKG SB RBBB,  SEE ROS  Past Medical History:   Diagnosis Date    Depression     Hyperlipidemia 5/8/2019    Hypertension      Past Surgical History:   Procedure Laterality Date    CORONARY ARTERY BYPASS GRAFT       Family History   Problem Relation Age of Onset    Diabetes Mother     No Known Problems Father     Cancer Sister         pt does not know what kind     Diabetes Sister     No Known Problems Sister     No Known Problems Sister      Social History     Socioeconomic History    Marital status:      Spouse name: Not on file    Number of children: Not on file    Years of education: Not on file    Highest education level: Not on file   Occupational History    Not on file   Social Needs    Financial resource strain: Not on file    Food insecurity:     Worry: Not on file     Inability: Not on file    Transportation needs:     Medical: Not on file     Non-medical: Not on file   Tobacco Use    Smoking status: Current Every Day Smoker     Packs/day: 1.00    Smokeless tobacco: Never Used   Substance and Sexual Activity    Alcohol use: Yes     Comment: ocassional     Drug use: Not on file    Sexual activity: Not on file   Lifestyle    Physical activity:     Days per week: Not on file     Minutes per session: Not on file    Stress: Not on file   Relationships    Social connections:     Talks on phone: Not on file     Gets together: Not on file     Attends Yazdanism service: Not on file     Active member of club or organization: Not on file     Attends meetings of clubs or organizations: Not on file     Relationship status: Not on file   Other Topics Concern    Not on file   Social History Narrative    Not on file       Review of patient's allergies indicates:  No Known Allergies    Current Outpatient Medications:     aspirin (ECOTRIN) 81 MG EC tablet, Take 81 mg by mouth once daily., Disp: , Rfl:     atenolol (TENORMIN) 50 MG tablet, Take 50 mg by mouth once daily., Disp: , Rfl:     ferrous sulfate (FEOSOL) 325 mg (65 mg iron) Tab tablet, Take 1 tablet by mouth once daily., Disp: , Rfl: 0    finasteride (PROSCAR) 5 mg tablet, TAKE 1 TABLET BY MOUTH DAILY, Disp: 30 tablet, Rfl: 6    furosemide (LASIX ORAL), Take 40 mg by mouth once daily. , Disp: , Rfl:     HYDROcodone-acetaminophen (NORCO) 5-325 mg per tablet, Take 1 tablet by mouth every 8  (eight) hours as needed for Pain., Disp: 21 tablet, Rfl: 0    montelukast (SINGULAIR) 10 mg tablet, TAKE 1 TABLET(10 MG) BY MOUTH EVERY EVENING, Disp: 90 tablet, Rfl: 0    multivitamin (ONE DAILY MULTIVITAMIN) per tablet, Take 1 tablet by mouth once daily., Disp: , Rfl:     potassium chloride SA (K-DUR,KLOR-CON) 20 MEQ tablet, Take 1 tablet by mouth once daily., Disp: , Rfl:     sildenafil (VIAGRA) 100 MG tablet, Take 1 tablet (100 mg total) by mouth daily as needed., Disp: 6 tablet, Rfl: 11    telmisartan (MICARDIS) 40 MG Tab, Take 1 tablet (40 mg total) by mouth once daily., Disp: 90 tablet, Rfl: 3    escitalopram oxalate (LEXAPRO) 10 MG tablet, TAKE 1 TABLET BY MOUTH EVERY DAY, Disp: , Rfl:     rosuvastatin (CRESTOR) 20 MG tablet, Take 1 tablet (20 mg total) by mouth once daily., Disp: 90 tablet, Rfl: 0    Review of Systems   Constitution: Negative for chills, diaphoresis, fever, malaise/fatigue and night sweats.   HENT: Negative for congestion, hearing loss and nosebleeds.    Eyes: Negative for blurred vision and visual disturbance.   Cardiovascular: Negative for chest pain, claudication, cyanosis, dyspnea on exertion (MILD), irregular heartbeat, leg swelling, near-syncope, orthopnea, palpitations, paroxysmal nocturnal dyspnea and syncope.   Respiratory: Negative for cough, hemoptysis, shortness of breath and wheezing.    Endocrine: Negative for cold intolerance, heat intolerance and polyuria.   Hematologic/Lymphatic: Negative for adenopathy. Does not bruise/bleed easily.   Skin: Negative for color change, itching, nail changes and rash.   Musculoskeletal: Negative for back pain, falls and joint pain (SHOULDERS, L KNEE).   Gastrointestinal: Negative for abdominal pain, change in bowel habit, dysphagia, heartburn, hematemesis, jaundice, melena and vomiting.   Genitourinary: Negative for dysuria, flank pain and frequency.        ED   Neurological: Negative for brief paralysis, difficulty with concentration,  "disturbances in coordination, dizziness, focal weakness, light-headedness, loss of balance, numbness, paresthesias, seizures, sensory change, tremors and weakness.   Psychiatric/Behavioral: Negative for altered mental status, depression, memory loss and substance abuse. The patient is not nervous/anxious.    Allergic/Immunologic: Negative for persistent infections.        Objective:      Vitals:    11/20/19 1058   BP: 110/66   Pulse: (!) 59   Weight: 87.6 kg (193 lb 2 oz)   Height: 5' 4" (1.626 m)   PainSc:   8   PainLoc: Shoulder     Body mass index is 33.15 kg/m².    Physical Exam   Constitutional: He is oriented to person, place, and time. He appears well-developed and well-nourished. He is active.   HENT:   Head: Normocephalic and atraumatic.   Mouth/Throat: Oropharynx is clear and moist and mucous membranes are normal.   Eyes: Pupils are equal, round, and reactive to light. Conjunctivae and EOM are normal.   Neck: Normal range of motion. Neck supple. Normal carotid pulses, no hepatojugular reflux and no JVD present. Carotid bruit is present. No edema and no erythema present. No thyromegaly present.   Cardiovascular: Normal rate and regular rhythm.  No extrasystoles are present. PMI is not displaced. Exam reveals no gallop, no distant heart sounds, no friction rub and no midsystolic click.   Murmur heard.   Systolic murmur is present with a grade of 2/6 at the lower left sternal border.  Pulses:       Carotid pulses are 2+ on the right side with bruit, and 2+ on the left side.       Radial pulses are 2+ on the right side, and 2+ on the left side.        Femoral pulses are 2+ on the right side, and 2+ on the left side.       Popliteal pulses are 2+ on the right side, and 2+ on the left side.        Dorsalis pedis pulses are 2+ on the right side, and 2+ on the left side.        Posterior tibial pulses are 2+ on the right side, and 2+ on the left side.   Pulmonary/Chest: Effort normal and breath sounds normal. No " accessory muscle usage. No tachypnea and no bradypnea. No respiratory distress.   STERNOTOMY SCAR   Abdominal: Soft. Bowel sounds are normal. He exhibits no distension and no mass. There is no hepatosplenomegaly. There is no CVA tenderness.       LAPAROTOMY SCAR   Musculoskeletal: Normal range of motion. He exhibits no edema or deformity.   Lymphadenopathy:     He has no cervical adenopathy.   Neurological: He is alert and oriented to person, place, and time. He has normal strength. He displays no tremor. No cranial nerve deficit.   Skin: Skin is warm and dry. No cyanosis or erythema. No pallor.   Psychiatric: He has a normal mood and affect. His speech is normal and behavior is normal. Thought content normal.               ..    Chemistry        Component Value Date/Time     11/05/2019 1553    K 4.2 11/05/2019 1553     11/05/2019 1553    CO2 23 11/05/2019 1553    BUN 13 11/05/2019 1553    CREATININE 1.0 11/05/2019 1553    GLU 88 11/05/2019 1553        Component Value Date/Time    CALCIUM 9.6 11/05/2019 1553    ALKPHOS 65 11/05/2019 1553    AST 20 11/05/2019 1553    ALT 25 11/05/2019 1553    BILITOT 0.8 11/05/2019 1553    ESTGFRAFRICA >60.0 11/05/2019 1553    EGFRNONAA >60.0 11/05/2019 1553            ..  Lab Results   Component Value Date    CHOL 148 11/05/2019    CHOL 148 04/07/2008    CHOL 179 03/08/2005     Lab Results   Component Value Date    HDL 43 11/05/2019    HDL 36 (L) 04/07/2008    HDL 35.0 (L) 03/08/2005     Lab Results   Component Value Date    LDLCALC 91.6 11/05/2019    LDLCALC 94.2 04/07/2008    LDLCALC 113.0 03/08/2005     Lab Results   Component Value Date    TRIG 67 11/05/2019    TRIG 89 04/07/2008    TRIG 155 (H) 03/08/2005     Lab Results   Component Value Date    CHOLHDL 29.1 11/05/2019    CHOLHDL 24.3 04/07/2008    CHOLHDL 19.6 (L) 03/08/2005     ..  Lab Results   Component Value Date    WBC 9.86 11/05/2019    HGB 16.1 11/05/2019    HCT 51.6 11/05/2019    MCV 91 11/05/2019    PLT  207 11/05/2019       Test(s) Reviewed  I have reviewed the following in detail:  [] Stress test   [] Angiography   [] Echocardiogram   [x] Labs   [x] Other:       Assessment:         ICD-10-CM ICD-9-CM   1. Coronary artery disease involving coronary bypass graft of native heart with other forms of angina pectoris I25.708 414.05     413.9   2. Undiagnosed cardiac murmurs R01.1 785.2   3. Hypercholesterolemia E78.00 272.0   4. Bruit of right carotid artery R09.89 785.9   5. RBBB I45.10 426.4   6. Essential hypertension I10 401.9   7. Encounter for tobacco use cessation counseling Z71.6 V65.42   8. Obesity, Class I, BMI 30-34.9 E66.9 278.00     Problem List Items Addressed This Visit        Cardiac/Vascular    Essential hypertension    Relevant Orders    Comprehensive metabolic panel    Hypercholesterolemia    Relevant Orders    Comprehensive metabolic panel    Lipid panel    Coronary artery disease involving coronary bypass graft of native heart - Primary    Relevant Orders    Comprehensive metabolic panel    Nuclear Stress - Cardiology Interpreted    Echo    Undiagnosed cardiac murmurs    Relevant Orders    Comprehensive metabolic panel    Echo    RBBB    Relevant Orders    Comprehensive metabolic panel    Bruit of right carotid artery    Relevant Orders    Comprehensive metabolic panel    CV Ultrasound Bilateral Doppler Carotid       Endocrine    Obesity, Class I, BMI 30-34.9       Other    Encounter for tobacco use cessation counseling    Relevant Orders    Comprehensive metabolic panel           Plan:         LAST EKG SINUS BRADYCARDIA, RIGHT BUNDLE-BRANCH BLOCK TOBACCO CESSATION COUNSELING, TARGET LDL LOWER HDL HIGHER, CHANGE ZOCOR TO CRESTOR 20, WILL NEED EVALUATION, CHECK NUCLEAR STRESS TEST TO ASSESS FOR ISCHEMIA, CHECK, ECHO, AND CAROTID CAROTID ULTRASOUND, RETURN TO CLINIC IN 3 MO WITH LABS, DISCUSSED PLAN WITH THE PATIENT IN DETAILS,ALL OTHER CV CLINICALLY STABLE, NO HF, NO TIA, NO CLINICAL  ARRHYTHMIA,CONTINUE CURRENT MEDS, EDUCATION, DIET, EXERCISE, WEIGHT LOSS  Coronary artery disease involving coronary bypass graft of native heart with other forms of angina pectoris  -     Comprehensive metabolic panel; Future; Expected date: 11/20/2019  -     Nuclear Stress - Cardiology Interpreted; Future  -     Echo; Future    Undiagnosed cardiac murmurs  -     Comprehensive metabolic panel; Future; Expected date: 11/20/2019  -     Echo; Future    Hypercholesterolemia  -     Comprehensive metabolic panel; Future; Expected date: 11/20/2019  -     Lipid panel; Future; Expected date: 11/20/2019    Bruit of right carotid artery  -     Comprehensive metabolic panel; Future; Expected date: 11/20/2019  -     CV Ultrasound Bilateral Doppler Carotid; Future    RBBB  -     Comprehensive metabolic panel; Future; Expected date: 11/20/2019    Essential hypertension  -     Comprehensive metabolic panel; Future; Expected date: 11/20/2019    Encounter for tobacco use cessation counseling  -     Comprehensive metabolic panel; Future; Expected date: 11/20/2019    Obesity, Class I, BMI 30-34.9    Other orders  -     rosuvastatin (CRESTOR) 20 MG tablet; Take 1 tablet (20 mg total) by mouth once daily.  Dispense: 90 tablet; Refill: 0    RTC Low level/low impact aerobic exercise 5x's/wk. Heart healthy diet and risk factor modification.    See labs and med orders.    Aerobic exercise 5x's/wk. Heart healthy diet and risk factor modification.    See labs and med orders.

## 2019-11-22 ENCOUNTER — TELEPHONE (OUTPATIENT)
Dept: FAMILY MEDICINE | Facility: CLINIC | Age: 69
End: 2019-11-22

## 2019-11-22 DIAGNOSIS — M25.511 CHRONIC PAIN OF BOTH SHOULDERS: Primary | ICD-10-CM

## 2019-11-22 DIAGNOSIS — G89.29 CHRONIC PAIN OF BOTH SHOULDERS: Primary | ICD-10-CM

## 2019-11-22 DIAGNOSIS — M25.512 CHRONIC PAIN OF BOTH SHOULDERS: Primary | ICD-10-CM

## 2019-11-22 NOTE — TELEPHONE ENCOUNTER
----- Message from Tico Dubois sent at 11/22/2019  7:28 AM CST -----  Type:  Patient Returning Call    Who Called:  Self for Patient:   Does the patient know what this is regarding?: NA   Best Call Back Number:  [phone]   Additional Information:

## 2019-11-22 NOTE — TELEPHONE ENCOUNTER
Contacted patient and asked if he wanted to see Dr. Mayo again or an orthopedic doctor. Patient stated he wants a referral to pain management. Pended order.

## 2019-12-02 ENCOUNTER — OFFICE VISIT (OUTPATIENT)
Dept: PAIN MEDICINE | Facility: CLINIC | Age: 69
End: 2019-12-02
Payer: MEDICARE

## 2019-12-02 VITALS
BODY MASS INDEX: 32.95 KG/M2 | WEIGHT: 193 LBS | HEART RATE: 59 BPM | DIASTOLIC BLOOD PRESSURE: 76 MMHG | SYSTOLIC BLOOD PRESSURE: 152 MMHG | HEIGHT: 64 IN

## 2019-12-02 DIAGNOSIS — M54.2 NECK PAIN: ICD-10-CM

## 2019-12-02 DIAGNOSIS — M54.12 CERVICAL RADICULITIS: Primary | ICD-10-CM

## 2019-12-02 PROCEDURE — 99204 OFFICE O/P NEW MOD 45 MIN: CPT | Mod: S$GLB,,, | Performed by: ANESTHESIOLOGY

## 2019-12-02 PROCEDURE — 1125F PR PAIN SEVERITY QUANTIFIED, PAIN PRESENT: ICD-10-PCS | Mod: S$GLB,,, | Performed by: ANESTHESIOLOGY

## 2019-12-02 PROCEDURE — 1101F PR PT FALLS ASSESS DOC 0-1 FALLS W/OUT INJ PAST YR: ICD-10-PCS | Mod: CPTII,S$GLB,, | Performed by: ANESTHESIOLOGY

## 2019-12-02 PROCEDURE — 1125F AMNT PAIN NOTED PAIN PRSNT: CPT | Mod: S$GLB,,, | Performed by: ANESTHESIOLOGY

## 2019-12-02 PROCEDURE — 1159F PR MEDICATION LIST DOCUMENTED IN MEDICAL RECORD: ICD-10-PCS | Mod: S$GLB,,, | Performed by: ANESTHESIOLOGY

## 2019-12-02 PROCEDURE — 99999 PR PBB SHADOW E&M-EST. PATIENT-LVL III: ICD-10-PCS | Mod: PBBFAC,,, | Performed by: ANESTHESIOLOGY

## 2019-12-02 PROCEDURE — 3077F SYST BP >= 140 MM HG: CPT | Mod: CPTII,S$GLB,, | Performed by: ANESTHESIOLOGY

## 2019-12-02 PROCEDURE — 99204 PR OFFICE/OUTPT VISIT, NEW, LEVL IV, 45-59 MIN: ICD-10-PCS | Mod: S$GLB,,, | Performed by: ANESTHESIOLOGY

## 2019-12-02 PROCEDURE — 99999 PR PBB SHADOW E&M-EST. PATIENT-LVL III: CPT | Mod: PBBFAC,,, | Performed by: ANESTHESIOLOGY

## 2019-12-02 PROCEDURE — 3078F DIAST BP <80 MM HG: CPT | Mod: CPTII,S$GLB,, | Performed by: ANESTHESIOLOGY

## 2019-12-02 PROCEDURE — 3077F PR MOST RECENT SYSTOLIC BLOOD PRESSURE >= 140 MM HG: ICD-10-PCS | Mod: CPTII,S$GLB,, | Performed by: ANESTHESIOLOGY

## 2019-12-02 PROCEDURE — 1101F PT FALLS ASSESS-DOCD LE1/YR: CPT | Mod: CPTII,S$GLB,, | Performed by: ANESTHESIOLOGY

## 2019-12-02 PROCEDURE — 1159F MED LIST DOCD IN RCRD: CPT | Mod: S$GLB,,, | Performed by: ANESTHESIOLOGY

## 2019-12-02 PROCEDURE — 3078F PR MOST RECENT DIASTOLIC BLOOD PRESSURE < 80 MM HG: ICD-10-PCS | Mod: CPTII,S$GLB,, | Performed by: ANESTHESIOLOGY

## 2019-12-02 RX ORDER — IBUPROFEN 600 MG/1
600 TABLET ORAL 2 TIMES DAILY PRN
Qty: 30 TABLET | Refills: 1 | Status: SHIPPED | OUTPATIENT
Start: 2019-12-02 | End: 2020-04-02

## 2019-12-02 RX ORDER — GABAPENTIN 300 MG/1
300 CAPSULE ORAL 2 TIMES DAILY
Qty: 60 CAPSULE | Refills: 0 | Status: SHIPPED | OUTPATIENT
Start: 2019-12-02 | End: 2020-02-06

## 2019-12-02 NOTE — LETTER
December 2, 2019      Kalani Velázquez MD  [address] [facility] [address] [place] [address]           [facility]  [address] [place] [address]  Phone: [phone]  Fax: [phone]          Patient: Alexis Medrano Jr.   MR Number: [id number]   YOB: 1950   Date of Visit: 12/2/2019       Dear Dr. Kalani Velázquez:    Thank you for referring Alexis Medrano to me for evaluation. Attached you will find relevant portions of my assessment and plan of care.    If you have questions, please do not hesitate to call me. I look forward to following Alexis Medrano along with you.    Sincerely,    Marquis Hong MD    Enclosure  CC:  No Recipients    If you would like to receive this communication electronically, please contact [email or web address] or [phone] to request more information on [employer] access.    For providers and/or their staff who would like to refer a patient to [facility], please contact us through our one-stop-shop provider referral line, [facility], at [phone].    If you feel you have received this communication in error or would no longer like to receive these types of communications, please e-mail [email or web address]

## 2019-12-02 NOTE — PROGRESS NOTES
[facility] New Patient Evaluation    Referred by: Dr. Velázquez  Reason for referral: shoulder pain    CC:   Chief Complaint   Patient presents with    Shoulder Pain     bilateral    Neck Pain    Cervical Spine Pain (C-spine)      Last 3 PDI Scores 12/2/2019   Pain Disability Index (PDI) 40       HPI:   Alexis Medrano Jr. is a 69 y.o. male who complains of shoulder pain    Onset: 30 years   Inciting Event: none  Progression: since onset, pain is gradually worsening  Current Pain Score: 8/10  Timing: intermittent  Quality: aching  Radiation: yes, down both arms  Associated numbness or weakness: no numbness, no weakness  Exacerbated by: lifting  Allievated by: nothing  Is Pain Level Acceptable?: No    Previous Therapies:  PT/OT: yes, in the past  HEP:   Interventions:   Surgery:  Medications:   - NSAIDS: aleve  - MSK Relaxants:   - TCAs:   - SNRIs:   - Topicals:   - Anticonvulsants:  - Opioids: hydrocodone    History:    Current Outpatient Medications:     aspirin (ECOTRIN) 81 MG EC tablet, Take 81 mg by mouth once daily., Disp: , Rfl:     atenolol (TENORMIN) 50 MG tablet, Take 50 mg by mouth once daily., Disp: , Rfl:     escitalopram oxalate (LEXAPRO) 10 MG tablet, TAKE 1 TABLET BY MOUTH EVERY DAY, Disp: , Rfl:     ferrous sulfate (FEOSOL) 325 mg (65 mg iron) Tab tablet, Take 1 tablet by mouth once daily., Disp: , Rfl: 0    finasteride (PROSCAR) 5 mg tablet, TAKE 1 TABLET BY MOUTH DAILY, Disp: 30 tablet, Rfl: 6    furosemide (LASIX ORAL), Take 40 mg by mouth once daily. , Disp: , Rfl:     HYDROcodone-acetaminophen (NORCO) 5-325 mg per tablet, Take 1 tablet by mouth every 8 (eight) hours as needed for Pain., Disp: 21 tablet, Rfl: 0    montelukast (SINGULAIR) 10 mg tablet, TAKE 1 TABLET(10 MG) BY MOUTH EVERY EVENING, Disp: 90 tablet, Rfl: 0    multivitamin (ONE DAILY MULTIVITAMIN) per tablet, Take 1 tablet by mouth once daily., Disp: , Rfl:     potassium chloride SA (K-DUR,KLOR-CON) 20 MEQ tablet, Take  1 tablet by mouth once daily., Disp: , Rfl:     rosuvastatin (CRESTOR) 20 MG tablet, Take 1 tablet (20 mg total) by mouth once daily., Disp: 90 tablet, Rfl: 0    sildenafil (VIAGRA) 100 MG tablet, Take 1 tablet (100 mg total) by mouth daily as needed., Disp: 6 tablet, Rfl: 11    telmisartan (MICARDIS) 40 MG Tab, Take 1 tablet (40 mg total) by mouth once daily., Disp: 90 tablet, Rfl: 3    gabapentin (NEURONTIN) 300 MG capsule, Take 1 capsule (300 mg total) by mouth 2 (two) times daily., Disp: 60 capsule, Rfl: 0    ibuprofen (ADVIL,MOTRIN) 600 MG tablet, Take 1 tablet (600 mg total) by mouth 2 (two) times daily as needed for Pain., Disp: 30 tablet, Rfl: 1    Past Medical History:   Diagnosis Date    Depression     Hyperlipidemia 5/8/2019    Hypertension        Past Surgical History:   Procedure Laterality Date    CORONARY ARTERY BYPASS GRAFT         Family History   Problem Relation Age of Onset    Diabetes Mother     No Known Problems Father     Cancer Sister         pt does not know what kind    Diabetes Sister     No Known Problems Sister     No Known Problems Sister        Social History     Socioeconomic History    Marital status:      Spouse name: Not on file    Number of children: Not on file    Years of education: Not on file    Highest education level: Not on file   Occupational History    Not on file   Social Needs    Financial resource strain: Not on file    Food insecurity:     Worry: Not on file     Inability: Not on file    Transportation needs:     Medical: Not on file     Non-medical: Not on file   Tobacco Use    Smoking status: Current Every Day Smoker     Packs/day: 1.00    Smokeless tobacco: Never Used   Substance and Sexual Activity    Alcohol use: Yes     Comment: ocassional     Drug use: Not on file    Sexual activity: Not on file   Lifestyle    Physical activity:     Days per week: Not on file     Minutes per session: Not on file    Stress: Not on file  "  Relationships    Social connections:     Talks on phone: Not on file     Gets together: Not on file     Attends Gnosticism service: Not on file     Active member of club or organization: Not on file     Attends meetings of clubs or organizations: Not on file     Relationship status: Not on file   Other Topics Concern    Not on file   Social History Narrative    Not on file       Review of patient's allergies indicates:  No Known Allergies    Review of Systems:  General ROS: negative for - fever  Psychological ROS: negative for - hostility  Hematological and Lymphatic ROS: negative for - bleeding problems  Endocrine ROS: negative for - unexpected weight changes  Respiratory ROS: no cough, shortness of breath, or wheezing  Cardiovascular ROS: no chest pain or dyspnea on exertion  Gastrointestinal ROS: no abdominal pain, change in bowel habits, or black or bloody stools  Musculoskeletal ROS: negative for - muscular weakness  Neurological ROS: negative for - bowel and bladder control changes or numbness/tingling  Dermatological ROS: negative for rash    Physical Exam:  Vitals:    12/02/19 0916   BP: (!) 152/76   Pulse: (!) 59   Weight: 87.5 kg (193 lb 0.2 oz)   Height: 5' 4" (1.626 m)   PainSc:   8   PainLoc: Shoulder     Body mass index is 33.13 kg/m².     Gen: NAD  Psych: mood appropriate for given condition  CV: 2+ radial pulse  HEENT: anicteric   Respiratory: non labored  Abd: soft nt, nd  Skin: intact  Sensation: intact to lt touch bilaterally in c4-t1   Reflexes: 0/0 b/l Bicep, tricep, and 1+ b/l patella Ty negative  ROM: Cervical ROM full, shoulder, elbow and wrist ROM full  Tone:  Normal at elbow, wrist and shoulder   Inspection: no atrophy of bicep, FDI or APB noted  Special tests: + axial facet loading b/l  Palpation: tender cervical paraspinals, levator scapula and trapezius    Motor:    Right Left   C4 Shoulder Abduction  5  5   C5 Elbow Flexion    5  5   C6 Wrist Extension  5  5   C7 Elbow " Extension   5  5   C8/T1 Hand Intrinsics   5  5   C8 First Dorsal Interosseus  5  5   C8 Abductor Pollicus Brevis  5  5       Imaging:  Xray b/l shoulders 11/5/19  FINDINGS:  Widening of the acromioclavicular joint on the right greater than left is noted as can be seen with acromioclavicular ligamentous injury or posttraumatic osteolysis.  Please clinically correlate.  Spurring is noted at the acromioclavicular joint on the left.  No obvious fractures or dislocations are noted.  Surgical clips are noted in the region of the mediastinum.    Labs:  BMP  Lab Results   Component Value Date     11/05/2019    K 4.2 11/05/2019     11/05/2019    CO2 23 11/05/2019    BUN 13 11/05/2019    CREATININE 1.0 11/05/2019    CALCIUM 9.6 11/05/2019    ANIONGAP 8 11/05/2019    ESTGFRAFRICA >60.0 11/05/2019    EGFRNONAA >60.0 11/05/2019     Lab Results   Component Value Date    ALT 25 11/05/2019    AST 20 11/05/2019    ALKPHOS 65 11/05/2019    BILITOT 0.8 11/05/2019       Assessment:  Problem List Items Addressed This Visit     None      Visit Diagnoses     Cervical radiculitis    -  Primary    Relevant Medications    gabapentin (NEURONTIN) 300 MG capsule    ibuprofen (ADVIL,MOTRIN) 600 MG tablet    Neck pain              Treatment Plan:  69 y.o. year old male with PMH HTN, CAD presents to the office with shoulder pain.  His pain starts in his neck and radiates down the back of both of his shoulder blades and into his proximal arms on both sides, intermittent, aching.  He denies any numbness or weakness.  He has tried PT in the past without relief.  He has tried aleve in the past.  I offered a couple different treatment options and I also recommended cervical MRI to evaluate his anatomy for possible Keyur. He declines at this time.  He will trial the gabapentin and let us know how it works.  He can follow up as needed or let us know if he would like to pursue further imaging.    Procedures: discussed possible Keyur once MRI  reviewed, however he is not interested in injections  PT/OT/HEP: offered to restart formal PT, he declines.  I gave him a list with some home exercises he can do  Medications: trial gabapentin 300mg po bid prn.  Will start 300mg qhs x1 week then 300mg bid as tolerated.  Ibuprofen 600mg po bid prn.  Tylenol 500mg bid prn  Labs: Reviewed and medications are appropriately dosed for current hepatorenal function.  Imaging: Recommended MRI cervical spine, he declines at this time as he is not interested in injections.      : Reviewed    Marquis Hong M.D.  Interventional Pain Medicine / Anesthesiology

## 2019-12-05 RX ORDER — MONTELUKAST SODIUM 10 MG/1
TABLET ORAL
Qty: 30 TABLET | Refills: 0 | OUTPATIENT
Start: 2019-12-05

## 2019-12-05 NOTE — PROGRESS NOTES
Quick DC. Refill Too Soon  Refill Authorization Note     is requesting a refill authorization.    Brief assessment and rationale for refill: Quick DC; rts (2/20)  Name and strength of medication: montelukast (SINGULAIR) 10 mg tablet            Medication reconciliation completed: No                         Comments:   Last Prescribed Info:    Ordering User:  Kalani Velázquez MD               Original Order:  montelukast (SINGULAIR) 10 mg tablet [[id number]]      Pharmacy:  [facility] #[id number] [address] LIBORIO #:  [id number]     Pharmacy Comments:  --          Fill quantity remaining:  -- Fill quantity used:  -- Next fill due: --       Outpatient Medication Detail      Disp Refills Start End    montelukast (SINGULAIR) 10 mg tablet 90 tablet 0 11/7/2019     Sig: TAKE 1 TABLET(10 MG) BY MOUTH EVERY EVENING    Sent to pharmacy as: montelukast (SINGULAIR) 10 mg tablet    Notes to Pharmacy: **Patient requests 90 days supply**    E-Prescribing Status: Receipt confirmed by pharmacy (11/7/2019  9:11 AM CST)

## 2019-12-30 DIAGNOSIS — F41.9 ANXIETY: ICD-10-CM

## 2019-12-31 RX ORDER — ESCITALOPRAM OXALATE 10 MG/1
10 TABLET ORAL DAILY
Qty: 30 TABLET | Refills: 3 | Status: SHIPPED | OUTPATIENT
Start: 2019-12-31 | End: 2020-03-13

## 2019-12-31 RX ORDER — HYDROCODONE BITARTRATE AND ACETAMINOPHEN 5; 325 MG/1; MG/1
1 TABLET ORAL EVERY 8 HOURS PRN
Qty: 21 TABLET | Refills: 0 | Status: SHIPPED | OUTPATIENT
Start: 2019-12-31 | End: 2020-02-06 | Stop reason: SDUPTHER

## 2019-12-31 NOTE — TELEPHONE ENCOUNTER
----- Message from Daniela Huthcison sent at 12/31/2019  8:07 AM CST -----  Contact: self  Type:  Patient Returning Call    Who Called:  patient  Who Left Message for Patient:  Brittanie  Does the patient know what this is regarding?:  Not sure   Best Call Back Number:  [phone] (home) or [phone] Cell  Additional Information:  na

## 2019-12-31 NOTE — TELEPHONE ENCOUNTER
Called and spoke with patient, stated that he needs a refill on him Norco and Lexapro, stated the he received a letter from [facility] stating her doesn't need an authorization to get this medication. Please advise on refill request. Please advise as Dr. Velázquez is out of clinic. Orders pended.   LOV: 11/05/19  NOV: 02/05/2020

## 2020-01-02 NOTE — TELEPHONE ENCOUNTER
.Called pt to let him know that the prescription requested was sent to the pharmacy, lefta  left message and a call back number.

## 2020-01-15 ENCOUNTER — TELEPHONE (OUTPATIENT)
Dept: FAMILY MEDICINE | Facility: CLINIC | Age: 70
End: 2020-01-15

## 2020-01-15 NOTE — TELEPHONE ENCOUNTER
----- Message from Selena Chambers sent at 1/15/2020 10:08 AM CST -----  Contact: KAY MANZO JR. [[id number]]  Contact: KAY MANZO JR. [[id number]]    What is the request in detail:   Requesting a call in regards to a rash on his genitals and arms he wants to discuss with the nurse and have a prescription called into pharmacy     Can the clinic reply by [facility]:  No      What Number to Call Back if not in [facility]:  [phone]

## 2020-01-15 NOTE — TELEPHONE ENCOUNTER
I spoke with pt and he said he has a rash on his genitals and arms that itches. He said he put vaseline on his skin after taking a bath. He requested an appointment for Friday and this was done.

## 2020-01-28 RX ORDER — ROSUVASTATIN CALCIUM 20 MG/1
TABLET, COATED ORAL
Qty: 90 TABLET | Refills: 0 | Status: SHIPPED | OUTPATIENT
Start: 2020-01-28 | End: 2020-03-18 | Stop reason: SDUPTHER

## 2020-02-06 ENCOUNTER — OFFICE VISIT (OUTPATIENT)
Dept: FAMILY MEDICINE | Facility: CLINIC | Age: 70
End: 2020-02-06
Payer: MEDICARE

## 2020-02-06 VITALS
WEIGHT: 186.31 LBS | BODY MASS INDEX: 31.81 KG/M2 | DIASTOLIC BLOOD PRESSURE: 86 MMHG | HEIGHT: 64 IN | SYSTOLIC BLOOD PRESSURE: 114 MMHG | HEART RATE: 59 BPM | OXYGEN SATURATION: 97 % | TEMPERATURE: 98 F

## 2020-02-06 DIAGNOSIS — F17.200 TOBACCO DEPENDENCE: ICD-10-CM

## 2020-02-06 DIAGNOSIS — I25.708 CORONARY ARTERY DISEASE INVOLVING CORONARY BYPASS GRAFT OF NATIVE HEART WITH OTHER FORMS OF ANGINA PECTORIS: ICD-10-CM

## 2020-02-06 DIAGNOSIS — Z12.11 SCREENING FOR COLON CANCER: ICD-10-CM

## 2020-02-06 DIAGNOSIS — I73.9 PAD (PERIPHERAL ARTERY DISEASE): ICD-10-CM

## 2020-02-06 DIAGNOSIS — M54.12 CERVICAL RADICULITIS: Primary | ICD-10-CM

## 2020-02-06 DIAGNOSIS — I10 ESSENTIAL HYPERTENSION: ICD-10-CM

## 2020-02-06 DIAGNOSIS — E78.49 OTHER HYPERLIPIDEMIA: ICD-10-CM

## 2020-02-06 PROCEDURE — 1159F MED LIST DOCD IN RCRD: CPT | Mod: S$GLB,,, | Performed by: FAMILY MEDICINE

## 2020-02-06 PROCEDURE — 1101F PR PT FALLS ASSESS DOC 0-1 FALLS W/OUT INJ PAST YR: ICD-10-PCS | Mod: CPTII,S$GLB,, | Performed by: FAMILY MEDICINE

## 2020-02-06 PROCEDURE — 3074F SYST BP LT 130 MM HG: CPT | Mod: CPTII,S$GLB,, | Performed by: FAMILY MEDICINE

## 2020-02-06 PROCEDURE — 99214 PR OFFICE/OUTPT VISIT, EST, LEVL IV, 30-39 MIN: ICD-10-PCS | Mod: S$GLB,,, | Performed by: FAMILY MEDICINE

## 2020-02-06 PROCEDURE — 99999 PR PBB SHADOW E&M-EST. PATIENT-LVL IV: ICD-10-PCS | Mod: PBBFAC,,, | Performed by: FAMILY MEDICINE

## 2020-02-06 PROCEDURE — 3079F PR MOST RECENT DIASTOLIC BLOOD PRESSURE 80-89 MM HG: ICD-10-PCS | Mod: CPTII,S$GLB,, | Performed by: FAMILY MEDICINE

## 2020-02-06 PROCEDURE — 3079F DIAST BP 80-89 MM HG: CPT | Mod: CPTII,S$GLB,, | Performed by: FAMILY MEDICINE

## 2020-02-06 PROCEDURE — 1101F PT FALLS ASSESS-DOCD LE1/YR: CPT | Mod: CPTII,S$GLB,, | Performed by: FAMILY MEDICINE

## 2020-02-06 PROCEDURE — 99999 PR PBB SHADOW E&M-EST. PATIENT-LVL IV: CPT | Mod: PBBFAC,,, | Performed by: FAMILY MEDICINE

## 2020-02-06 PROCEDURE — 3074F PR MOST RECENT SYSTOLIC BLOOD PRESSURE < 130 MM HG: ICD-10-PCS | Mod: CPTII,S$GLB,, | Performed by: FAMILY MEDICINE

## 2020-02-06 PROCEDURE — 1126F AMNT PAIN NOTED NONE PRSNT: CPT | Mod: S$GLB,,, | Performed by: FAMILY MEDICINE

## 2020-02-06 PROCEDURE — 99214 OFFICE O/P EST MOD 30 MIN: CPT | Mod: S$GLB,,, | Performed by: FAMILY MEDICINE

## 2020-02-06 PROCEDURE — 1159F PR MEDICATION LIST DOCUMENTED IN MEDICAL RECORD: ICD-10-PCS | Mod: S$GLB,,, | Performed by: FAMILY MEDICINE

## 2020-02-06 PROCEDURE — 1126F PR PAIN SEVERITY QUANTIFIED, NO PAIN PRESENT: ICD-10-PCS | Mod: S$GLB,,, | Performed by: FAMILY MEDICINE

## 2020-02-06 RX ORDER — HYDROCODONE BITARTRATE AND ACETAMINOPHEN 5; 325 MG/1; MG/1
1 TABLET ORAL EVERY 8 HOURS PRN
Qty: 21 TABLET | Refills: 0 | Status: SHIPPED | OUTPATIENT
Start: 2020-02-06 | End: 2020-03-04 | Stop reason: SDUPTHER

## 2020-02-06 NOTE — PATIENT INSTRUCTIONS
Eating Heart-Healthy Food: Using the DASH Plan    Eating for your heart doesnt have to be hard or boring. You just need to know how to make healthier choices. The DASH eating plan has been developed to help you do just that. DASH stands for Dietary Approaches to Stop Hypertension. It is a plan that has been proven to be healthier for your heart and to lower your risk for high blood pressure. It can also help lower your risk for cancer, heart disease, osteoporosis, and diabetes.  Choosing from each food group  Choose foods from each of the food groups below each day. Try to get the recommended number of servings for each food group. The serving numbers are based on a diet of 2,000 calories a day. Talk to your doctor if youre unsure about your calorie needs. Along with getting the correct servings, the DASH plan also recommends a sodium intake less than 2,300 mg per day.        Grains  Servings: 6 to 8 a day  A serving is:  · 1 slice bread  · 1 ounce dry cereal  · Half a cup cooked rice, pasta or cereal  Best choices: Whole grains and any grains high in fiber. Vegetables  Servings: 4 to 5 a day  A serving is:  · 1 cup raw leafy vegetable  · Half a cup cut-up raw or cooked vegetable  · Half a cup vegetable juice  Best choices: Fresh or frozen vegetables prepared without added salt or fat.   Fruits  Servings: 4 to 5 a day  A serving is:  · 1 medium fruit  · One-quarter cup dried fruit  · Half a cup fresh, frozen, or canned fruit  · Half a cup of 100% fruit juices  Best choices: A variety of fresh fruits of different colors. Whole fruits are a better choice than fruit juices. Low-fat or fat-free dairy  Servings: 2 to 3 a day  A serving is:  · 1 cup milk  · 1 cup yogurt  · One and a half ounces cheese  Best choices: Skim or 1% milk, low-fat or fat-free yogurt or buttermilk, and low-fat cheeses.         Lean meats, poultry, fish  Servings: 6 or fewer a day  A serving is:  · 1 ounce cooked meats, poultry, or fish  · 1  egg  Best choices: Lean poultry and fish. Trim away visible fat. Broil, grill, roast, or boil instead of frying. Remove skin from poultry before eating. Limit how much red meat you eat.  Nuts, seeds, beans  Servings: 4 to 5 a week  A serving is:  · One-third cup nuts (one and a half ounces)  · 2 tablespoons nut butter or seeds  · Half a cup cooked dry beans or legumes  Best choices: Dry roasted nuts with no salt added, lentils, kidney beans, garbanzo beans, and whole angela beans.   Fats and oils  Servings: 2 to 3 a day  A serving is:  · 1 teaspoon vegetable oil  · 1 teaspoon soft margarine  · 1 tablespoon mayonnaise  · 2 tablespoons salad dressing  Best choices: Nut and vegetable oils (nontropical vegetable oils), such as olive and canola oil. Sweets  Servings: 5 a week or fewer  A serving is:  · 1 tablespoon sugar, maple syrup, or honey  · 1 tablespoon jam or jelly  · 1 half-ounce jelly beans (about 15)  · 1 cup lemonade  Best choices: Dried fruit can be a satisfying sweet. Choose low-fat sweets. And watch your serving sizes!      For more on the DASH eating plan, visit:  www.nhlbi.nih.gov/health/health-topics/topics/dash   Date Last Reviewed: 6/1/2016  © 9617-6543 [employer]. [address] [place] [address], [place] [address]. All rights reserved. This information is not intended as a substitute for professional medical care. Always follow your healthcare professional's instructions.

## 2020-02-09 NOTE — PROGRESS NOTES
Subjective:       Patient ID: Alexis Medrano Jr. is a 69 y.o. male.    Chief Complaint: Follow-up (3 month )    HPI     Neck pain:  The patient stated the symptoms of neck pain are getting worse.  The patient stated that was referred to see the pain management specialist but he does not want to have any procedure, he was taking pain medications with good relief of the symptoms and would like to continue taking the medication.  This is symptoms of chest pain and shortness of breath at this office visit.    Hypertension:  The patient is been taking his blood pressure medications as directed, denies any side effects of the medication.  The patient still smoking cigarettes daily, he did not bring a log of the blood pressure readings from home.    Hyperlipidemia:  The patient is currently taking Crestor, denies any side effects of the medication.  The patient has history of coronary artery disease and peripheral vascular disease. His last cholesterol levels were therapeutic.    Coronary artery disease and peripheral vascular disease:  The patient is currently seen the cardiologist for this problem.  Taking his blood pressure medications as directed, he still smoking cigarettes, he does not want to quit at this time.    Past medical history, past social history was reviewed and discussed with the patient.    Review of Systems   Constitutional: Negative for activity change and appetite change.   HENT: Negative for congestion and ear discharge.    Eyes: Negative for discharge and itching.   Respiratory: Negative for choking and chest tightness.    Cardiovascular: Negative for chest pain and leg swelling.   Gastrointestinal: Negative for abdominal distention and abdominal pain.   Endocrine: Negative for cold intolerance and heat intolerance.   Genitourinary: Negative for dysuria and flank pain.   Musculoskeletal: Positive for arthralgias, back pain, myalgias and neck pain.   Skin: Negative for pallor and rash.    Allergic/Immunologic: Negative for environmental allergies and food allergies.   Neurological: Negative for dizziness and facial asymmetry.   Hematological: Negative for adenopathy. Does not bruise/bleed easily.   Psychiatric/Behavioral: Negative for agitation, confusion and sleep disturbance.       Objective:      Physical Exam   Constitutional: He appears well-developed and well-nourished. No distress.   HENT:   Head: Normocephalic and atraumatic.   Right Ear: External ear normal.   Left Ear: External ear normal.   Nose: Nose normal.   Mouth/Throat: No oropharyngeal exudate.   Eyes: Pupils are equal, round, and reactive to light. Right eye exhibits no discharge. Left eye exhibits no discharge. No scleral icterus.   Neck: Normal range of motion. Neck supple. No tracheal deviation present. No thyromegaly present.   Cardiovascular: Normal rate, regular rhythm and normal heart sounds. Exam reveals no friction rub.   No murmur heard.  Pulmonary/Chest: Effort normal and breath sounds normal. No respiratory distress. He has no wheezes.   Musculoskeletal: He exhibits tenderness (Cervical spine with decreased range of motion). He exhibits no edema.   Neurological: No cranial nerve deficit. Coordination normal.   Skin: Skin is warm and dry. No rash noted. He is not diaphoretic. No erythema. No pallor.   Psychiatric: He has a normal mood and affect. His behavior is normal. Judgment and thought content normal.   Nursing note and vitals reviewed.      Assessment:       1. Tobacco dependence    2. Coronary artery disease involving coronary bypass graft of native heart with other forms of angina pectoris    3. PAD (peripheral artery disease)    4. Cervical radiculitis    5. Essential hypertension    6. Other hyperlipidemia    7. Screening for colon cancer        Plan:       Cervical radiculitis:  Worsening  -     Ambulatory referral/consult to Pain Clinic; Future; Expected date: 02/13/2020  -     HYDROcodone-acetaminophen  (NORCO) 5-325 mg per tablet; Take 1 tablet by mouth every 8 (eight) hours as needed for Pain.  Dispense: 21 tablet; Refill: 0    Tobacco dependence:  Worsening    Coronary artery disease involving coronary bypass graft of native heart with other forms of angina pectoris: stable    PAD (peripheral artery disease): stable    Essential hypertension:  Stable    Other hyperlipidemia: Well controlled     Screening for colon cancer  -     Case request GI: COLONOSCOPY    The patient was strongly advised to quit tobacco, he is in the pre contemplation phase.  Will refer the patient to pain management doctor Reid per patient preference.  [place] prescription monitoring program was checked and okay.  The patient's BMI has been recorded in the chart. The patient has been provided educational materials regarding the benefits of attaining and maintaining a normal weight. We will continue to address and follow this issue during follow up visits.Patient agreed with assessment and plan. Patient verbalized understanding.

## 2020-02-13 DIAGNOSIS — M54.12 CERVICAL RADICULITIS: ICD-10-CM

## 2020-02-13 RX ORDER — FERROUS SULFATE 325(65) MG
1 TABLET ORAL DAILY
Qty: 30 TABLET | Refills: 0 | Status: SHIPPED | OUTPATIENT
Start: 2020-02-13 | End: 2020-03-25

## 2020-02-13 RX ORDER — HYDROCODONE BITARTRATE AND ACETAMINOPHEN 5; 325 MG/1; MG/1
1 TABLET ORAL EVERY 8 HOURS PRN
Qty: 21 TABLET | Refills: 0 | Status: CANCELLED | OUTPATIENT
Start: 2020-02-13

## 2020-02-13 NOTE — TELEPHONE ENCOUNTER
I will not be able to refill the pain medication, I did the for 7 days, I explained that to the patient.  The time of his appointment.  I referred the patient to pain management, did he go?

## 2020-02-13 NOTE — TELEPHONE ENCOUNTER
----- Message from Gerri Bowen sent at 2/13/2020  2:04 PM CST -----    Type:  RX Refill Request    Who Called: pt  Refill RX Name and Strength:  hydrocodone  325 mg  How is the patient currently taking it? (ex. 1XDay):    Is this a 30 day or 90 day RX:    7  Days   Preferred Pharmacy with phone number:    [facility] #[id number] [address] [address]  Phone: [phone] Fax: [phone]  Best Call Back Number:  [phone]  Additional Information:     Please call

## 2020-02-13 NOTE — TELEPHONE ENCOUNTER
----- Message from Josiah López sent at 2/13/2020 11:11 AM CST -----  Contact: Patient  Refill request    HYDROcodone-acetaminophen (NORCO) 5-325 mg per tablet  ferrous sulfate (FEOSOL) 325 mg (65 mg iron) Tab tablet    Be sent to:    [id number] - [address]   [phone] (Phone)  [phone] (Fax)

## 2020-02-17 ENCOUNTER — TELEPHONE (OUTPATIENT)
Dept: FAMILY MEDICINE | Facility: CLINIC | Age: 70
End: 2020-02-17

## 2020-02-17 DIAGNOSIS — M54.12 CERVICAL RADICULITIS: ICD-10-CM

## 2020-02-17 RX ORDER — HYDROCODONE BITARTRATE AND ACETAMINOPHEN 5; 325 MG/1; MG/1
1 TABLET ORAL EVERY 8 HOURS PRN
Qty: 21 TABLET | Refills: 0 | Status: CANCELLED | OUTPATIENT
Start: 2020-02-17

## 2020-02-17 NOTE — TELEPHONE ENCOUNTER
Call placed to patient, LVM to call back to office.  Left detail that the medication requested could not be refilled, and asked if he had seen the specialist who could?

## 2020-02-17 NOTE — TELEPHONE ENCOUNTER
I cannot refill the medication, I only can give her him 7 days of medication, I explained that to the patient in the office visit.  Can you please check if he was refer with Dr. Mathews, he was asking external referral.  Thank you

## 2020-02-17 NOTE — TELEPHONE ENCOUNTER
----- Message from Dolores Lo sent at 2/17/2020  9:50 AM CST -----  Contact: Patient  Type:  RX Refill Request    Who Called:  Patient  Refill or New Rx:  New RX  RX Name and Strength:  HYDROcodone-acetaminophen (NORCO) 5-325 mg per tablet  How is the patient currently taking it? (ex. 1XDay):  Take 1 tablet by mouth every 8 (eight) hours as needed for Pain. - Oral  Is this a 30 day or 90 day RX:  21 tablet  Preferred Pharmacy with phone number:    [id number] - [place], [place] - [facility] [address] [facility]  [address] [facility] [address]  Phone: [phone] Fax: [phone]  Local or Mail Order:  local  Ordering Provider:  Kalani Velázquez MD  Best Call Back Number: [phone] or    Additional Information:  Patient advised he is out of this medication and in need of more.

## 2020-02-19 ENCOUNTER — TELEPHONE (OUTPATIENT)
Dept: FAMILY MEDICINE | Facility: CLINIC | Age: 70
End: 2020-02-19

## 2020-02-19 NOTE — TELEPHONE ENCOUNTER
Spoke with patient and he states that doctor in [place] that he when to he didn't like and he is going to look for someone else. Told patient that Dr. Velázquez is not going to refill his pain meds he needs to see the specialist.

## 2020-02-19 NOTE — TELEPHONE ENCOUNTER
----- Message from Clotilde  sent at 2/19/2020  2:29 PM CST -----  Contact: pt  Type:  Patient Returning Call    Who Called:  pt  Who Left Message for Patient:  Karyn  Does the patient know what this is regarding?:  no  Best Call Back Number:   or   Additional Information:  Pt called back

## 2020-03-03 ENCOUNTER — TELEPHONE (OUTPATIENT)
Dept: FAMILY MEDICINE | Facility: CLINIC | Age: 70
End: 2020-03-03

## 2020-03-03 DIAGNOSIS — M54.12 CERVICAL RADICULITIS: ICD-10-CM

## 2020-03-03 NOTE — TELEPHONE ENCOUNTER
I spoke to the patient last office visit and a told him that I do not treat chronic pain, and that is the reason why I sent him to the pain management specialist.  I will not be able to refill chronically his pain medication.  I gave him the prescription for 7 days.  I placed an external referral as patient did want to continue care for pain management at [facility], because he did not want to have any procedures done.  Thank you

## 2020-03-03 NOTE — TELEPHONE ENCOUNTER
----- Message from Marky Jose sent at 3/3/2020  9:19 AM CST -----  Contact: pt  Type:  RX Refill Request    Who Called:  pt  Refill or New Rx:  refill  RX Name and Strength:  HYDROcodone-acetaminophen (NORCO) 5-325 mg per tablet  How is the patient currently taking it? (ex. 1XDay):   Take 1 tablet by mouth every 8 (eight) hours as needed for Pain. - Oral  Is this a 30 day or 90 day RX:  21 tablet  Preferred Pharmacy with phone number:    [facility] #[id number] [address] [address]  Phone: [phone] Fax: [phone]    Local or Mail Order:  local  Ordering Provider:  Kalani Velázquez MD  Best Call Back Number:  [phone]  Additional Information:

## 2020-03-04 RX ORDER — HYDROCODONE BITARTRATE AND ACETAMINOPHEN 5; 325 MG/1; MG/1
1 TABLET ORAL EVERY 12 HOURS PRN
Qty: 14 TABLET | Refills: 0 | Status: SHIPPED | OUTPATIENT
Start: 2020-03-04 | End: 2020-03-11

## 2020-03-04 NOTE — TELEPHONE ENCOUNTER
Called pt, notified message per Dr. Velázquez and he verbally understood. He stated pain management has not called him. Upon scheduling I found he has seen someone there in 2019 but he did not want to see the same person. New appt made with new provider on 3/17/2020. He is wanting to know if you can write him some to last him till then,. Please advise.

## 2020-03-05 ENCOUNTER — TELEPHONE (OUTPATIENT)
Dept: FAMILY MEDICINE | Facility: CLINIC | Age: 70
End: 2020-03-05

## 2020-03-06 NOTE — TELEPHONE ENCOUNTER
----- Message from Dolores Lo sent at 3/5/2020  4:06 PM CST -----  Contact: Patient  Type: Needs Medical Advice    Who Called:  Patient  Best Call Back Number:   Additional Information: Was told to get in contact with the office about his abnormal chest x ray. He advised he will be getting the results faxed over.

## 2020-03-11 ENCOUNTER — TELEPHONE (OUTPATIENT)
Dept: FAMILY MEDICINE | Facility: CLINIC | Age: 70
End: 2020-03-11

## 2020-03-11 NOTE — TELEPHONE ENCOUNTER
----- Message from Jose Angel Taylor sent at 3/11/2020  9:17 AM CDT -----  Contact: pt  Type: Needs Medical Advice    Who Called:  pt    Best Call Back Number: [phone]  Additional Information: pt called to f/u and see if his fax about his chest Xray was received on 3.6.20. Please call to advise

## 2020-03-12 ENCOUNTER — TELEPHONE (OUTPATIENT)
Dept: FAMILY MEDICINE | Facility: CLINIC | Age: 70
End: 2020-03-12

## 2020-03-12 NOTE — TELEPHONE ENCOUNTER
----- Message from Ramone Connor sent at 3/12/2020  8:49 AM CDT -----  Contact: same  Patient called in and stated he had a chest x-ray somewhere in [place] (could not remember where) sometime in December or January and wanted to see if office had received the results yet.  Was not an [facility] facility.    Patient would like a call back number is [phone] or [phone])

## 2020-03-12 NOTE — TELEPHONE ENCOUNTER
Please notify patient that I just received a fax from Dr. Daniel Zimmerman, board-certified radiologist, and the chest x-ray that was done on 12/17/2019, showed presence of fibrosis and adenopathy, please if you can make him an appointment to discuss these results and create a plan, can you please make him an appointment, can be telemedicine visit so the patient does not need to come to the office.  Thank you

## 2020-03-12 NOTE — TELEPHONE ENCOUNTER
Spoke with patient and he scheduled appointment for 3/13/2020. Told we could do video visit but he wants to come in office because he wants bw.

## 2020-03-13 ENCOUNTER — OFFICE VISIT (OUTPATIENT)
Dept: FAMILY MEDICINE | Facility: CLINIC | Age: 70
End: 2020-03-13
Payer: MEDICARE

## 2020-03-13 ENCOUNTER — LAB VISIT (OUTPATIENT)
Dept: LAB | Facility: HOSPITAL | Age: 70
End: 2020-03-13
Attending: FAMILY MEDICINE
Payer: MEDICARE

## 2020-03-13 VITALS
BODY MASS INDEX: 32.01 KG/M2 | DIASTOLIC BLOOD PRESSURE: 86 MMHG | SYSTOLIC BLOOD PRESSURE: 138 MMHG | WEIGHT: 186.5 LBS | OXYGEN SATURATION: 97 % | HEART RATE: 70 BPM

## 2020-03-13 DIAGNOSIS — J84.10 PULMONARY INTERSTITIAL FIBROSIS: Primary | ICD-10-CM

## 2020-03-13 DIAGNOSIS — E78.49 OTHER HYPERLIPIDEMIA: ICD-10-CM

## 2020-03-13 DIAGNOSIS — J30.1 SEASONAL ALLERGIC RHINITIS DUE TO POLLEN: ICD-10-CM

## 2020-03-13 DIAGNOSIS — R09.89 RUNNY NOSE: ICD-10-CM

## 2020-03-13 DIAGNOSIS — R59.1 LYMPHADENOPATHY: ICD-10-CM

## 2020-03-13 DIAGNOSIS — R93.89 ABNORMAL CHEST X-RAY: ICD-10-CM

## 2020-03-13 DIAGNOSIS — F41.9 ANXIETY: ICD-10-CM

## 2020-03-13 LAB
ALBUMIN SERPL BCP-MCNC: 4.1 G/DL (ref 3.5–5.2)
ALP SERPL-CCNC: 68 U/L (ref 55–135)
ALT SERPL W/O P-5'-P-CCNC: 27 U/L (ref 10–44)
ANION GAP SERPL CALC-SCNC: 9 MMOL/L (ref 8–16)
AST SERPL-CCNC: 23 U/L (ref 10–40)
BILIRUB SERPL-MCNC: 0.6 MG/DL (ref 0.1–1)
BUN SERPL-MCNC: 17 MG/DL (ref 8–23)
CALCIUM SERPL-MCNC: 9.6 MG/DL (ref 8.7–10.5)
CHLORIDE SERPL-SCNC: 105 MMOL/L (ref 95–110)
CHOLEST SERPL-MCNC: 133 MG/DL (ref 120–199)
CHOLEST/HDLC SERPL: 3.3 {RATIO} (ref 2–5)
CO2 SERPL-SCNC: 24 MMOL/L (ref 23–29)
CREAT SERPL-MCNC: 1.2 MG/DL (ref 0.5–1.4)
EST. GFR  (AFRICAN AMERICAN): >60 ML/MIN/1.73 M^2
EST. GFR  (NON AFRICAN AMERICAN): >60 ML/MIN/1.73 M^2
GLUCOSE SERPL-MCNC: 87 MG/DL (ref 70–110)
HDLC SERPL-MCNC: 40 MG/DL (ref 40–75)
HDLC SERPL: 30.1 % (ref 20–50)
LDLC SERPL CALC-MCNC: 76 MG/DL (ref 63–159)
NONHDLC SERPL-MCNC: 93 MG/DL
POTASSIUM SERPL-SCNC: 4.1 MMOL/L (ref 3.5–5.1)
PROT SERPL-MCNC: 8 G/DL (ref 6–8.4)
SODIUM SERPL-SCNC: 138 MMOL/L (ref 136–145)
TRIGL SERPL-MCNC: 85 MG/DL (ref 30–150)

## 2020-03-13 PROCEDURE — 1159F MED LIST DOCD IN RCRD: CPT | Mod: S$GLB,,, | Performed by: FAMILY MEDICINE

## 2020-03-13 PROCEDURE — 3075F SYST BP GE 130 - 139MM HG: CPT | Mod: CPTII,S$GLB,, | Performed by: FAMILY MEDICINE

## 2020-03-13 PROCEDURE — 99999 PR PBB SHADOW E&M-EST. PATIENT-LVL IV: ICD-10-PCS | Mod: PBBFAC,,, | Performed by: FAMILY MEDICINE

## 2020-03-13 PROCEDURE — 3075F PR MOST RECENT SYSTOLIC BLOOD PRESS GE 130-139MM HG: ICD-10-PCS | Mod: CPTII,S$GLB,, | Performed by: FAMILY MEDICINE

## 2020-03-13 PROCEDURE — 3079F PR MOST RECENT DIASTOLIC BLOOD PRESSURE 80-89 MM HG: ICD-10-PCS | Mod: CPTII,S$GLB,, | Performed by: FAMILY MEDICINE

## 2020-03-13 PROCEDURE — 1159F PR MEDICATION LIST DOCUMENTED IN MEDICAL RECORD: ICD-10-PCS | Mod: S$GLB,,, | Performed by: FAMILY MEDICINE

## 2020-03-13 PROCEDURE — 1125F PR PAIN SEVERITY QUANTIFIED, PAIN PRESENT: ICD-10-PCS | Mod: S$GLB,,, | Performed by: FAMILY MEDICINE

## 2020-03-13 PROCEDURE — 1101F PT FALLS ASSESS-DOCD LE1/YR: CPT | Mod: CPTII,S$GLB,, | Performed by: FAMILY MEDICINE

## 2020-03-13 PROCEDURE — 99999 PR PBB SHADOW E&M-EST. PATIENT-LVL IV: CPT | Mod: PBBFAC,,, | Performed by: FAMILY MEDICINE

## 2020-03-13 PROCEDURE — 36415 COLL VENOUS BLD VENIPUNCTURE: CPT | Mod: PO

## 2020-03-13 PROCEDURE — 99214 PR OFFICE/OUTPT VISIT, EST, LEVL IV, 30-39 MIN: ICD-10-PCS | Mod: S$GLB,,, | Performed by: FAMILY MEDICINE

## 2020-03-13 PROCEDURE — 1125F AMNT PAIN NOTED PAIN PRSNT: CPT | Mod: S$GLB,,, | Performed by: FAMILY MEDICINE

## 2020-03-13 PROCEDURE — 3079F DIAST BP 80-89 MM HG: CPT | Mod: CPTII,S$GLB,, | Performed by: FAMILY MEDICINE

## 2020-03-13 PROCEDURE — 80053 COMPREHEN METABOLIC PANEL: CPT

## 2020-03-13 PROCEDURE — 80061 LIPID PANEL: CPT

## 2020-03-13 PROCEDURE — 1101F PR PT FALLS ASSESS DOC 0-1 FALLS W/OUT INJ PAST YR: ICD-10-PCS | Mod: CPTII,S$GLB,, | Performed by: FAMILY MEDICINE

## 2020-03-13 PROCEDURE — 99214 OFFICE O/P EST MOD 30 MIN: CPT | Mod: S$GLB,,, | Performed by: FAMILY MEDICINE

## 2020-03-13 RX ORDER — AZELASTINE 1 MG/ML
1 SPRAY, METERED NASAL 2 TIMES DAILY
Qty: 90 ML | Refills: 3 | Status: SHIPPED | OUTPATIENT
Start: 2020-03-13 | End: 2021-10-13 | Stop reason: SDUPTHER

## 2020-03-13 RX ORDER — ESCITALOPRAM OXALATE 20 MG/1
20 TABLET ORAL NIGHTLY
Qty: 90 TABLET | Refills: 3 | Status: SHIPPED | OUTPATIENT
Start: 2020-03-13 | End: 2021-04-15

## 2020-03-13 NOTE — PATIENT INSTRUCTIONS
Eating Heart-Healthy Food: Using the DASH Plan    Eating for your heart doesnt have to be hard or boring. You just need to know how to make healthier choices. The DASH eating plan has been developed to help you do just that. DASH stands for Dietary Approaches to Stop Hypertension. It is a plan that has been proven to be healthier for your heart and to lower your risk for high blood pressure. It can also help lower your risk for cancer, heart disease, osteoporosis, and diabetes.  Choosing from each food group  Choose foods from each of the food groups below each day. Try to get the recommended number of servings for each food group. The serving numbers are based on a diet of 2,000 calories a day. Talk to your doctor if youre unsure about your calorie needs. Along with getting the correct servings, the DASH plan also recommends a sodium intake less than 2,300 mg per day.        Grains  Servings: 6 to 8 a day  A serving is:  · 1 slice bread  · 1 ounce dry cereal  · Half a cup cooked rice, pasta or cereal  Best choices: Whole grains and any grains high in fiber. Vegetables  Servings: 4 to 5 a day  A serving is:  · 1 cup raw leafy vegetable  · Half a cup cut-up raw or cooked vegetable  · Half a cup vegetable juice  Best choices: Fresh or frozen vegetables prepared without added salt or fat.   Fruits  Servings: 4 to 5 a day  A serving is:  · 1 medium fruit  · One-quarter cup dried fruit  · Half a cup fresh, frozen, or canned fruit  · Half a cup of 100% fruit juices  Best choices: A variety of fresh fruits of different colors. Whole fruits are a better choice than fruit juices. Low-fat or fat-free dairy  Servings: 2 to 3 a day  A serving is:  · 1 cup milk  · 1 cup yogurt  · One and a half ounces cheese  Best choices: Skim or 1% milk, low-fat or fat-free yogurt or buttermilk, and low-fat cheeses.         Lean meats, poultry, fish  Servings: 6 or fewer a day  A serving is:  · 1 ounce cooked meats, poultry, or fish  · 1  egg  Best choices: Lean poultry and fish. Trim away visible fat. Broil, grill, roast, or boil instead of frying. Remove skin from poultry before eating. Limit how much red meat you eat.  Nuts, seeds, beans  Servings: 4 to 5 a week  A serving is:  · One-third cup nuts (one and a half ounces)  · 2 tablespoons nut butter or seeds  · Half a cup cooked dry beans or legumes  Best choices: Dry roasted nuts with no salt added, lentils, kidney beans, garbanzo beans, and whole angela beans.   Fats and oils  Servings: 2 to 3 a day  A serving is:  · 1 teaspoon vegetable oil  · 1 teaspoon soft margarine  · 1 tablespoon mayonnaise  · 2 tablespoons salad dressing  Best choices: Nut and vegetable oils (nontropical vegetable oils), such as olive and canola oil. Sweets  Servings: 5 a week or fewer  A serving is:  · 1 tablespoon sugar, maple syrup, or honey  · 1 tablespoon jam or jelly  · 1 half-ounce jelly beans (about 15)  · 1 cup lemonade  Best choices: Dried fruit can be a satisfying sweet. Choose low-fat sweets. And watch your serving sizes!      For more on the DASH eating plan, visit:  www.nhlbi.nih.gov/health/health-topics/topics/dash   Date Last Reviewed: 6/1/2016  © 5240-2896 [employer]. [address] [place] [address], [place] [address]. All rights reserved. This information is not intended as a substitute for professional medical care. Always follow your healthcare professional's instructions.

## 2020-03-14 NOTE — PROGRESS NOTES
Subjective:       Patient ID: Alexis Medrano Jr. is a 69 y.o. male.    Chief Complaint: Follow-up    HPI     The patient is coming here today for a follow-up visit.  Came to the office with his sister.    Anxiety:  The patient stated that in the past he took Valium 3 times daily 2 mg that were prescribed by the previous physician, and this was mailed to him.  He would like to continue with this medication.  The patient is currently taking Lexapro.  He denies any symptoms of depression at this office visit.    Abnormal chest x-ray:  The patient brought a chest x-ray that was done and the radiologist recommend follow-up secondary to possible asbestos exposure.  Also has presence of lymphadenopathy.  The patient denies any worsening shortness of breath at this office visit.    Hyperlipidemia:  The patient is currently taking Crestor, denies any side effects of the medication.  The patient denies any symptoms of chest pain at this office visit.    Allergic rhinitis:  The patient complains of symptoms runny nose and congestion, denies any fever or chills or sore throat.    Past medical history, past social history was reviewed and discussed with the patient.    Review of Systems   Constitutional: Negative for activity change and appetite change.   HENT: Positive for congestion, postnasal drip and rhinorrhea. Negative for ear discharge.    Eyes: Negative for discharge and itching.   Respiratory: Negative for choking and chest tightness.    Cardiovascular: Negative for chest pain and leg swelling.   Gastrointestinal: Negative for abdominal distention and abdominal pain.   Endocrine: Negative for cold intolerance and heat intolerance.   Genitourinary: Negative for dysuria and flank pain.   Musculoskeletal: Positive for back pain. Negative for arthralgias.   Skin: Negative for pallor and rash.   Allergic/Immunologic: Negative for environmental allergies and food allergies.   Neurological: Negative for dizziness and facial  asymmetry.   Hematological: Negative for adenopathy. Does not bruise/bleed easily.   Psychiatric/Behavioral: Positive for dysphoric mood and sleep disturbance. Negative for agitation and confusion. The patient is nervous/anxious.        Objective:      Physical Exam   Constitutional: He appears well-developed and well-nourished. No distress.   HENT:   Head: Normocephalic and atraumatic.   Right Ear: External ear normal.   Left Ear: External ear normal.   Nose: Mucosal edema and rhinorrhea present.   Mouth/Throat: No oropharyngeal exudate.   Eyes: Pupils are equal, round, and reactive to light. Right eye exhibits no discharge. Left eye exhibits no discharge. No scleral icterus.   Neck: Normal range of motion. Neck supple. No tracheal deviation present. No thyromegaly present.   Cardiovascular: Normal rate and regular rhythm. Exam reveals no friction rub.   No murmur heard.  Pulmonary/Chest: Effort normal and breath sounds normal. No respiratory distress. He has no wheezes.   Abdominal: Soft. Bowel sounds are normal. There is no tenderness. There is no guarding.   Musculoskeletal: He exhibits no edema or tenderness.   Neurological: No cranial nerve deficit. Coordination normal.   Skin: Skin is warm and dry. No rash noted. He is not diaphoretic. No erythema. No pallor.   Psychiatric: He has a normal mood and affect. His behavior is normal. Judgment and thought content normal.   Nursing note and vitals reviewed.      Assessment:       1. Pulmonary interstitial fibrosis    2. Abnormal chest x-ray    3. Lymphadenopathy    4. Runny nose    5. Seasonal allergic rhinitis due to pollen    6. Other hyperlipidemia        Plan:       Pulmonary interstitial fibrosis:  New problem, workup needed  -     Ambulatory referral/consult to Pulmonology; Future; Expected date: 03/20/2020    Abnormal chest x-ray:  New problem workup needed  -     Ambulatory referral/consult to Pulmonology; Future; Expected date:  03/20/2020    Lymphadenopathy:  New problem workup needed  -     CT Chest Without Contrast; Future; Expected date: 03/13/2020    Runny nose:  New problem, no further workup needed  -     azelastine (ASTELIN) 137 mcg (0.1 %) nasal spray; 1 spray (137 mcg total) by Nasal route 2 (two) times daily.  Dispense: 90 mL; Refill: 3    Seasonal allergic rhinitis due to pollen:  Worsening    Other hyperlipidemia:  Well controlled  -     Comprehensive metabolic panel; Future; Expected date: 03/13/2020  -     Lipid panel; Future; Expected date: 03/13/2020    Anxiety:  Worsening  -     escitalopram oxalate (LEXAPRO) 20 MG tablet; Take 1 tablet (20 mg total) by mouth every evening.  Dispense: 90 tablet; Refill: 3      Patient specific asking for Valium, I explained to the patient that Valium is very addictive and I will not prescribe the medication for chronic anxiety, will continue with Lexapro.  The patient was advised if necessary we can refer him to the psychiatrist, he preferred to wait on this.  Will refer the patient to the pulmonologist secondary to abnormal chest x-ray.  Will order CT scan secondary to lymphadenopathy on the chest x-ray.  Will start patient on Astelin inhaler for runny nose and congestion.  The patient's BMI has been recorded in the chart. The patient has been provided educational materials regarding the benefits of attaining and maintaining a normal weight. We will continue to address and follow this issue during follow up visits.   Patient agreed with assessment and plan. Patient verbalized understanding.

## 2020-03-16 ENCOUNTER — PATIENT OUTREACH (OUTPATIENT)
Dept: ADMINISTRATIVE | Facility: OTHER | Age: 70
End: 2020-03-16

## 2020-03-16 NOTE — PROGRESS NOTES
Chart reviewed.   Requested updates from Care Everywhere.  Immunizations reconciled.    updated.  [facility] record of colonoscopy.

## 2020-03-16 NOTE — LETTER
AUTHORIZATION FOR RELEASE OF   CONFIDENTIAL INFORMATION    Dear Dr. Tee,     We are seeing Alexis Medrano Jr., date of birth 1950, in the clinic at [facility]. Kalani Velázquez MD is the patient's PCP. Alexis Medrano Jr. has an outstanding lab/procedure at the time we reviewed his chart. In order to help keep his health information updated, he has authorized us to request the following medical record(s):       Colonoscopy                                                                            Please fax to [facility] @ [phone].    Thank you for your assistance in our patient's healthcare.     Sincerely,       Kendra Milligan, POE CCC

## 2020-03-17 ENCOUNTER — TELEPHONE (OUTPATIENT)
Dept: FAMILY MEDICINE | Facility: CLINIC | Age: 70
End: 2020-03-17

## 2020-03-17 DIAGNOSIS — M54.12 CERVICAL RADICULITIS: Primary | ICD-10-CM

## 2020-03-17 NOTE — TELEPHONE ENCOUNTER
----- Message from Marky Jose sent at 3/17/2020  3:31 PM CDT -----  Contact: pt  Type: Needs Medical Advice    Who Called:  pt    Best Call Back Number: [phone]  Additional Information: pt would like to speak to someone in the office regarding pain medication. States that he was turned away from [facility] Connor. Please call to advise.

## 2020-03-18 ENCOUNTER — OFFICE VISIT (OUTPATIENT)
Dept: CARDIOLOGY | Facility: CLINIC | Age: 70
End: 2020-03-18
Payer: MEDICARE

## 2020-03-18 VITALS
OXYGEN SATURATION: 96 % | BODY MASS INDEX: 31.5 KG/M2 | HEART RATE: 68 BPM | DIASTOLIC BLOOD PRESSURE: 62 MMHG | HEIGHT: 64 IN | SYSTOLIC BLOOD PRESSURE: 110 MMHG | WEIGHT: 184.5 LBS

## 2020-03-18 DIAGNOSIS — I25.708 CORONARY ARTERY DISEASE OF BYPASS GRAFT OF NATIVE HEART WITH STABLE ANGINA PECTORIS: Primary | ICD-10-CM

## 2020-03-18 DIAGNOSIS — E78.00 HYPERCHOLESTEROLEMIA: ICD-10-CM

## 2020-03-18 DIAGNOSIS — I10 ESSENTIAL HYPERTENSION: ICD-10-CM

## 2020-03-18 DIAGNOSIS — Z71.6 ENCOUNTER FOR TOBACCO USE CESSATION COUNSELING: ICD-10-CM

## 2020-03-18 DIAGNOSIS — E66.9 OBESITY, CLASS I, BMI 30-34.9: ICD-10-CM

## 2020-03-18 PROCEDURE — 1159F PR MEDICATION LIST DOCUMENTED IN MEDICAL RECORD: ICD-10-PCS | Mod: S$GLB,,, | Performed by: INTERNAL MEDICINE

## 2020-03-18 PROCEDURE — 1159F MED LIST DOCD IN RCRD: CPT | Mod: S$GLB,,, | Performed by: INTERNAL MEDICINE

## 2020-03-18 PROCEDURE — 3074F PR MOST RECENT SYSTOLIC BLOOD PRESSURE < 130 MM HG: ICD-10-PCS | Mod: CPTII,S$GLB,, | Performed by: INTERNAL MEDICINE

## 2020-03-18 PROCEDURE — 3078F DIAST BP <80 MM HG: CPT | Mod: CPTII,S$GLB,, | Performed by: INTERNAL MEDICINE

## 2020-03-18 PROCEDURE — 1101F PR PT FALLS ASSESS DOC 0-1 FALLS W/OUT INJ PAST YR: ICD-10-PCS | Mod: CPTII,S$GLB,, | Performed by: INTERNAL MEDICINE

## 2020-03-18 PROCEDURE — 3078F PR MOST RECENT DIASTOLIC BLOOD PRESSURE < 80 MM HG: ICD-10-PCS | Mod: CPTII,S$GLB,, | Performed by: INTERNAL MEDICINE

## 2020-03-18 PROCEDURE — 1126F PR PAIN SEVERITY QUANTIFIED, NO PAIN PRESENT: ICD-10-PCS | Mod: S$GLB,,, | Performed by: INTERNAL MEDICINE

## 2020-03-18 PROCEDURE — 1126F AMNT PAIN NOTED NONE PRSNT: CPT | Mod: S$GLB,,, | Performed by: INTERNAL MEDICINE

## 2020-03-18 PROCEDURE — 99214 PR OFFICE/OUTPT VISIT, EST, LEVL IV, 30-39 MIN: ICD-10-PCS | Mod: S$GLB,,, | Performed by: INTERNAL MEDICINE

## 2020-03-18 PROCEDURE — 99214 OFFICE O/P EST MOD 30 MIN: CPT | Mod: S$GLB,,, | Performed by: INTERNAL MEDICINE

## 2020-03-18 PROCEDURE — 1101F PT FALLS ASSESS-DOCD LE1/YR: CPT | Mod: CPTII,S$GLB,, | Performed by: INTERNAL MEDICINE

## 2020-03-18 PROCEDURE — 3074F SYST BP LT 130 MM HG: CPT | Mod: CPTII,S$GLB,, | Performed by: INTERNAL MEDICINE

## 2020-03-18 RX ORDER — ROSUVASTATIN CALCIUM 20 MG/1
TABLET, COATED ORAL
Qty: 90 TABLET | Refills: 1 | Status: SHIPPED | OUTPATIENT
Start: 2020-03-18 | End: 2021-03-04 | Stop reason: SDUPTHER

## 2020-03-18 NOTE — PROGRESS NOTES
Subjective:    Patient ID:  Alexis Medrano Jr. is a 69 y.o. male who presents for Coronary Artery Disease and Hyperlipidemia (labs)        HPI    DISCUSSED LABS AND GOALS LDL 76, CMP OK, NO TESTS/ PT CANCELED, OVERALL DOING OKAY NO CHEST PAIN NO SIGNIFICANT SHORTNESS OF BREATH NO TIA TYPE SYMPTOMS NO NEAR-SYNCOPE SEE ROS  Past Medical History:   Diagnosis Date    Depression     Hyperlipidemia 5/8/2019    Hypertension      Past Surgical History:   Procedure Laterality Date    CORONARY ARTERY BYPASS GRAFT       Family History   Problem Relation Age of Onset    Diabetes Mother     No Known Problems Father     Cancer Sister         pt does not know what kind    Diabetes Sister     No Known Problems Sister     No Known Problems Sister      Social History     Socioeconomic History    Marital status:      Spouse name: Not on file    Number of children: Not on file    Years of education: Not on file    Highest education level: Not on file   Occupational History    Not on file   Social Needs    Financial resource strain: Not on file    Food insecurity:     Worry: Not on file     Inability: Not on file    Transportation needs:     Medical: Not on file     Non-medical: Not on file   Tobacco Use    Smoking status: Current Every Day Smoker     Packs/day: 1.00    Smokeless tobacco: Never Used   Substance and Sexual Activity    Alcohol use: Yes     Comment: ocassional     Drug use: Not on file    Sexual activity: Not on file   Lifestyle    Physical activity:     Days per week: Not on file     Minutes per session: Not on file    Stress: Not on file   Relationships    Social connections:     Talks on phone: Not on file     Gets together: Not on file     Attends Orthodoxy service: Not on file     Active member of club or organization: Not on file     Attends meetings of clubs or organizations: Not on file     Relationship status: Not on file   Other Topics Concern    Not on file   Social History  Narrative    Not on file       Review of patient's allergies indicates:  No Known Allergies    Current Outpatient Medications:     aspirin (ECOTRIN) 81 MG EC tablet, Take 81 mg by mouth once daily., Disp: , Rfl:     atenolol (TENORMIN) 50 MG tablet, Take 50 mg by mouth once daily., Disp: , Rfl:     azelastine (ASTELIN) 137 mcg (0.1 %) nasal spray, 1 spray (137 mcg total) by Nasal route 2 (two) times daily., Disp: 90 mL, Rfl: 3    escitalopram oxalate (LEXAPRO) 20 MG tablet, Take 1 tablet (20 mg total) by mouth every evening., Disp: 90 tablet, Rfl: 3    ferrous sulfate (FEOSOL) 325 mg (65 mg iron) Tab tablet, Take 1 tablet (325 mg total) by mouth once daily., Disp: 30 tablet, Rfl: 0    finasteride (PROSCAR) 5 mg tablet, TAKE 1 TABLET BY MOUTH DAILY, Disp: 30 tablet, Rfl: 6    furosemide (LASIX ORAL), Take 40 mg by mouth once daily. , Disp: , Rfl:     ibuprofen (ADVIL,MOTRIN) 600 MG tablet, Take 1 tablet (600 mg total) by mouth 2 (two) times daily as needed for Pain., Disp: 30 tablet, Rfl: 1    montelukast (SINGULAIR) 10 mg tablet, TAKE 1 TABLET(10 MG) BY MOUTH EVERY EVENING, Disp: 90 tablet, Rfl: 0    multivitamin (ONE DAILY MULTIVITAMIN) per tablet, Take 1 tablet by mouth once daily., Disp: , Rfl:     potassium chloride SA (K-DUR,KLOR-CON) 20 MEQ tablet, Take 1 tablet by mouth once daily., Disp: , Rfl:     rosuvastatin (CRESTOR) 20 MG tablet, TAKE 1 TABLET(20 MG) BY MOUTH EVERY DAY, Disp: 90 tablet, Rfl: 1    sildenafiL (VIAGRA) 100 MG tablet, Take 1 tablet (100 mg total) by mouth daily as needed., Disp: 6 tablet, Rfl: 11    telmisartan (MICARDIS) 40 MG Tab, Take 1 tablet (40 mg total) by mouth once daily., Disp: 90 tablet, Rfl: 3    Review of Systems   Constitution: Negative for chills, diaphoresis, fever, malaise/fatigue and night sweats.   HENT: Negative for congestion and nosebleeds.    Eyes: Negative for blurred vision and visual disturbance.   Cardiovascular: Negative for chest pain,  "claudication, cyanosis, dyspnea on exertion (MILD), irregular heartbeat, leg swelling, near-syncope, orthopnea, palpitations, paroxysmal nocturnal dyspnea and syncope.   Respiratory: Negative for cough, hemoptysis, shortness of breath and wheezing.    Endocrine: Negative for cold intolerance, heat intolerance and polyuria.   Hematologic/Lymphatic: Negative for adenopathy. Does not bruise/bleed easily.   Skin: Negative for color change, itching, nail changes and rash.   Musculoskeletal: Negative for back pain, falls and joint pain (SHOULDERS, L KNEE).   Gastrointestinal: Negative for abdominal pain, change in bowel habit, dysphagia, heartburn, hematemesis, jaundice, melena and vomiting.   Genitourinary: Negative for dysuria, flank pain and frequency.        ED   Neurological: Negative for brief paralysis, dizziness, focal weakness, light-headedness, loss of balance, numbness, paresthesias, seizures, sensory change, tremors and weakness.   Psychiatric/Behavioral: Negative for altered mental status, depression, memory loss and substance abuse. The patient is not nervous/anxious.    Allergic/Immunologic: Negative for hives and persistent infections.        Objective:      Vitals:    03/18/20 1016   BP: 110/62   Pulse: 68   SpO2: 96%   Weight: 83.7 kg (184 lb 8.4 oz)   Height: 5' 4" (1.626 m)   PainSc: 0-No pain     Body mass index is 31.67 kg/m².    Physical Exam   Constitutional: He is oriented to person, place, and time. He appears well-developed and well-nourished. He is active.   HENT:   Head: Normocephalic and atraumatic.   Mouth/Throat: Oropharynx is clear and moist and mucous membranes are normal.   Eyes: Pupils are equal, round, and reactive to light. Conjunctivae and EOM are normal.   Neck: Normal range of motion. Neck supple. Normal carotid pulses, no hepatojugular reflux and no JVD present. Carotid bruit is present. No edema and no erythema present. No thyromegaly present.   Cardiovascular: Normal rate and " regular rhythm.  No extrasystoles are present. PMI is not displaced. Exam reveals no gallop, no distant heart sounds, no friction rub and no midsystolic click.   Murmur heard.   Systolic murmur is present with a grade of 2/6 at the lower left sternal border.  Pulses:       Carotid pulses are 2+ on the right side with bruit, and 2+ on the left side.       Radial pulses are 2+ on the right side, and 2+ on the left side.        Femoral pulses are 2+ on the right side, and 2+ on the left side.       Popliteal pulses are 2+ on the right side, and 2+ on the left side.        Dorsalis pedis pulses are 2+ on the right side, and 2+ on the left side.        Posterior tibial pulses are 2+ on the right side, and 2+ on the left side.   Pulmonary/Chest: Effort normal and breath sounds normal. No accessory muscle usage. No tachypnea and no bradypnea. No respiratory distress.   STERNOTOMY SCAR   Abdominal: Soft. Bowel sounds are normal. He exhibits no distension and no mass. There is no hepatosplenomegaly. There is no CVA tenderness.       LAPAROTOMY SCAR   Musculoskeletal: Normal range of motion. He exhibits no edema or deformity.   Lymphadenopathy:     He has no cervical adenopathy.   Neurological: He is alert and oriented to person, place, and time. He has normal strength. He displays no tremor. No cranial nerve deficit.   Skin: Skin is warm and dry. No cyanosis or erythema. No pallor.   Psychiatric: He has a normal mood and affect. His speech is normal and behavior is normal.               ..    Chemistry        Component Value Date/Time     03/13/2020 1138    K 4.1 03/13/2020 1138     03/13/2020 1138    CO2 24 03/13/2020 1138    BUN 17 03/13/2020 1138    CREATININE 1.2 03/13/2020 1138    GLU 87 03/13/2020 1138        Component Value Date/Time    CALCIUM 9.6 03/13/2020 1138    ALKPHOS 68 03/13/2020 1138    AST 23 03/13/2020 1138    ALT 27 03/13/2020 1138    BILITOT 0.6 03/13/2020 1138    ESTGFRAFRICA >60.0  03/13/2020 1138    EGFRNONAA >60.0 03/13/2020 1138            ..  Lab Results   Component Value Date    CHOL 133 03/13/2020    CHOL 148 11/05/2019    CHOL 148 04/07/2008     Lab Results   Component Value Date    HDL 40 03/13/2020    HDL 43 11/05/2019    HDL 36 (L) 04/07/2008     Lab Results   Component Value Date    LDLCALC 76.0 03/13/2020    LDLCALC 91.6 11/05/2019    LDLCALC 94.2 04/07/2008     Lab Results   Component Value Date    TRIG 85 03/13/2020    TRIG 67 11/05/2019    TRIG 89 04/07/2008     Lab Results   Component Value Date    CHOLHDL 30.1 03/13/2020    CHOLHDL 29.1 11/05/2019    CHOLHDL 24.3 04/07/2008     ..  Lab Results   Component Value Date    WBC 9.86 11/05/2019    HGB 16.1 11/05/2019    HCT 51.6 11/05/2019    MCV 91 11/05/2019     11/05/2019       Test(s) Reviewed  I have reviewed the following in detail:  [] Stress test   [] Angiography   [] Echocardiogram   [x] Labs   [] Other:       Assessment:         ICD-10-CM ICD-9-CM   1. Coronary artery disease of bypass graft of native heart with stable angina pectoris I25.708 414.05     413.9   2. Essential hypertension I10 401.9   3. Hypercholesterolemia E78.00 272.0   4. Obesity, Class I, BMI 30-34.9 E66.9 278.00   5. Encounter for tobacco use cessation counseling Z71.6 V65.42     Problem List Items Addressed This Visit        Cardiac/Vascular    Essential hypertension    Hypercholesterolemia    Coronary artery disease involving coronary bypass graft of native heart - Primary       Endocrine    Obesity, Class I, BMI 30-34.9       Other    Encounter for tobacco use cessation counseling           Plan:         DAILY CRESTOR, TOBACCO CESSATION COUNSELING, RE-SCHEDULE TESTS,ALL CV CLINICALLY STABLE, CLASS 0-1 ANGINA, NO HF, NO TIA, NO CLINICAL ARRHYTHMIA,CONTINUE CURRENT MEDS, EDUCATION, DIET, EXERCISE, WEIGHT LOSS RTC IN 6 MO  Coronary artery disease of bypass graft of native heart with stable angina pectoris    Essential  hypertension    Hypercholesterolemia    Obesity, Class I, BMI 30-34.9    Encounter for tobacco use cessation counseling    Other orders  -     rosuvastatin (CRESTOR) 20 MG tablet; TAKE 1 TABLET(20 MG) BY MOUTH EVERY DAY  Dispense: 90 tablet; Refill: 1    RTC Low level/low impact aerobic exercise 5x's/wk. Heart healthy diet and risk factor modification.    See labs and med orders.    Aerobic exercise 5x's/wk. Heart healthy diet and risk factor modification.    See labs and med orders.

## 2020-03-19 ENCOUNTER — TELEPHONE (OUTPATIENT)
Dept: FAMILY MEDICINE | Facility: CLINIC | Age: 70
End: 2020-03-19

## 2020-03-19 NOTE — TELEPHONE ENCOUNTER
----- Message from Ramone Connor sent at 3/19/2020  1:40 PM CDT -----  Contact: same  Unsuccessful IM sent to office.  Patient called in stated he was returning call to Esme but no idea why anyone was calling??  Patient call back number is [phone]

## 2020-03-19 NOTE — TELEPHONE ENCOUNTER
"See telephone encounter 3/17/20      Called pt, he stated he was turned away from pain management he stated "they were not taking his kind of cases that day". There is no note, so unsure what happened.  He is requesting some pain medication. Please advise.   "

## 2020-03-20 NOTE — TELEPHONE ENCOUNTER
Pt is calling [facility] to see why he can't get in with pain management and get his medications he needs. Will call us back if he needs something else.

## 2020-03-20 NOTE — TELEPHONE ENCOUNTER
I do not do chronic pain management.  I explained that to the patient.  Can he take anti-inflammatories?

## 2020-03-25 RX ORDER — FERROUS SULFATE 325(65) MG
TABLET ORAL
Qty: 30 TABLET | Refills: 0 | Status: SHIPPED | OUTPATIENT
Start: 2020-03-25 | End: 2020-04-14 | Stop reason: SDUPTHER

## 2020-04-01 ENCOUNTER — TELEPHONE (OUTPATIENT)
Dept: FAMILY MEDICINE | Facility: CLINIC | Age: 70
End: 2020-04-01

## 2020-04-01 NOTE — TELEPHONE ENCOUNTER
----- Message from Marily Grey sent at 4/1/2020  1:50 PM CDT -----  Contact: Patient  Type: Needs Medical Advice    Who Called:  Patient  Pharmacy name and phone #:  [facility] Call Back Number:[phone] (home)   Additional Information: the pt said he takes Norco for his pain he said he missed call from the Dr office

## 2020-04-01 NOTE — TELEPHONE ENCOUNTER
Spoke with pt he said he came for his appointment from [place] with Dr. Madrid on 3/17 and was turned away. And his appt was cancelled.   He said he is having pain in his shoulders, back and hips. He said that the weather affects the pain.   He is requesting something for his discomfort.   He is not on My chart..  He said he don't want the carona virus.

## 2020-04-01 NOTE — TELEPHONE ENCOUNTER
----- Message from Marily Grey sent at 4/1/2020 10:59 AM CDT -----  Contact: patient  Type: Needs Medical Advice    Who Called:patient  Pharmacy name and phone #:[facility]   [facility] Call Back Number:[phone] (home)   Additional Information: pt said he needs something for pain please call to advise

## 2020-04-02 ENCOUNTER — OFFICE VISIT (OUTPATIENT)
Dept: FAMILY MEDICINE | Facility: CLINIC | Age: 70
End: 2020-04-02
Payer: MEDICARE

## 2020-04-02 DIAGNOSIS — R26.89 BALANCE PROBLEM: ICD-10-CM

## 2020-04-02 DIAGNOSIS — M54.41 CHRONIC BILATERAL LOW BACK PAIN WITH BILATERAL SCIATICA: Primary | ICD-10-CM

## 2020-04-02 DIAGNOSIS — F17.210 CIGARETTE SMOKER: ICD-10-CM

## 2020-04-02 DIAGNOSIS — M54.42 CHRONIC BILATERAL LOW BACK PAIN WITH BILATERAL SCIATICA: Primary | ICD-10-CM

## 2020-04-02 DIAGNOSIS — G89.29 CHRONIC BILATERAL LOW BACK PAIN WITH BILATERAL SCIATICA: Primary | ICD-10-CM

## 2020-04-02 PROCEDURE — 99442 PR PHYSICIAN TELEPHONE EVALUATION 11-20 MIN: ICD-10-PCS | Mod: 95,,, | Performed by: FAMILY MEDICINE

## 2020-04-02 PROCEDURE — 99442 PR PHYSICIAN TELEPHONE EVALUATION 11-20 MIN: CPT | Mod: 95,,, | Performed by: FAMILY MEDICINE

## 2020-04-02 PROCEDURE — 99999 PR PBB SHADOW E&M-EST. PATIENT-LVL II: CPT | Mod: PBBFAC,,, | Performed by: FAMILY MEDICINE

## 2020-04-02 PROCEDURE — 99999 PR PBB SHADOW E&M-EST. PATIENT-LVL II: ICD-10-PCS | Mod: PBBFAC,,, | Performed by: FAMILY MEDICINE

## 2020-04-02 RX ORDER — HYDROCODONE BITARTRATE AND ACETAMINOPHEN 5; 325 MG/1; MG/1
1 TABLET ORAL EVERY 8 HOURS PRN
Qty: 21 TABLET | Refills: 0 | Status: SHIPPED | OUTPATIENT
Start: 2020-04-02 | End: 2020-04-09

## 2020-04-02 NOTE — PATIENT INSTRUCTIONS
Eating Heart-Healthy Food: Using the DASH Plan    Eating for your heart doesnt have to be hard or boring. You just need to know how to make healthier choices. The DASH eating plan has been developed to help you do just that. DASH stands for Dietary Approaches to Stop Hypertension. It is a plan that has been proven to be healthier for your heart and to lower your risk for high blood pressure. It can also help lower your risk for cancer, heart disease, osteoporosis, and diabetes.  Choosing from each food group  Choose foods from each of the food groups below each day. Try to get the recommended number of servings for each food group. The serving numbers are based on a diet of 2,000 calories a day. Talk to your doctor if youre unsure about your calorie needs. Along with getting the correct servings, the DASH plan also recommends a sodium intake less than 2,300 mg per day.        Grains  Servings: 6 to 8 a day  A serving is:  · 1 slice bread  · 1 ounce dry cereal  · Half a cup cooked rice, pasta or cereal  Best choices: Whole grains and any grains high in fiber. Vegetables  Servings: 4 to 5 a day  A serving is:  · 1 cup raw leafy vegetable  · Half a cup cut-up raw or cooked vegetable  · Half a cup vegetable juice  Best choices: Fresh or frozen vegetables prepared without added salt or fat.   Fruits  Servings: 4 to 5 a day  A serving is:  · 1 medium fruit  · One-quarter cup dried fruit  · Half a cup fresh, frozen, or canned fruit  · Half a cup of 100% fruit juices  Best choices: A variety of fresh fruits of different colors. Whole fruits are a better choice than fruit juices. Low-fat or fat-free dairy  Servings: 2 to 3 a day  A serving is:  · 1 cup milk  · 1 cup yogurt  · One and a half ounces cheese  Best choices: Skim or 1% milk, low-fat or fat-free yogurt or buttermilk, and low-fat cheeses.         Lean meats, poultry, fish  Servings: 6 or fewer a day  A serving is:  · 1 ounce cooked meats, poultry, or fish  · 1  egg  Best choices: Lean poultry and fish. Trim away visible fat. Broil, grill, roast, or boil instead of frying. Remove skin from poultry before eating. Limit how much red meat you eat.  Nuts, seeds, beans  Servings: 4 to 5 a week  A serving is:  · One-third cup nuts (one and a half ounces)  · 2 tablespoons nut butter or seeds  · Half a cup cooked dry beans or legumes  Best choices: Dry roasted nuts with no salt added, lentils, kidney beans, garbanzo beans, and whole angela beans.   Fats and oils  Servings: 2 to 3 a day  A serving is:  · 1 teaspoon vegetable oil  · 1 teaspoon soft margarine  · 1 tablespoon mayonnaise  · 2 tablespoons salad dressing  Best choices: Nut and vegetable oils (nontropical vegetable oils), such as olive and canola oil. Sweets  Servings: 5 a week or fewer  A serving is:  · 1 tablespoon sugar, maple syrup, or honey  · 1 tablespoon jam or jelly  · 1 half-ounce jelly beans (about 15)  · 1 cup lemonade  Best choices: Dried fruit can be a satisfying sweet. Choose low-fat sweets. And watch your serving sizes!      For more on the DASH eating plan, visit:  www.nhlbi.nih.gov/health/health-topics/topics/dash   Date Last Reviewed: 6/1/2016  © 4765-2568 [employer]. [address] [place] [address], [place] [address]. All rights reserved. This information is not intended as a substitute for professional medical care. Always follow your healthcare professional's instructions.

## 2020-04-02 NOTE — PROGRESS NOTES
Subjective:       Patient ID: Alexis Medrano Jr. is a 69 y.o. male.    Chief Complaint: Pain    The patient location is:  home  The chief complaint leading to consultation is:  Back pain  Visit type: Virtual visit with synchronous audio and audio.  Total time spent with patient:  12 min  Each patient to whom he or she provides medical services by telemedicine is:  (1) informed of the relationship between the physician and patient and the respective role of any other health care provider with respect to management of the patient; and (2) notified that he or she may decline to receive medical services by telemedicine and may withdraw from such care at any time.    Notes:     Back Pain   This is a chronic problem. The current episode started more than 1 year ago. The problem occurs constantly. The problem has been gradually worsening since onset. The pain is present in the lumbar spine. The quality of the pain is described as aching. Radiates to: Radiates bilateral lower extremities. The pain is at a severity of 5/10. The pain is moderate. The pain is the same all the time. The symptoms are aggravated by lying down, position, standing, sitting and twisting. Stiffness is present in the morning, all day and at night. Pertinent negatives include no abdominal pain, bladder incontinence, bowel incontinence, chest pain, dysuria, headaches, leg pain, numbness, paresis, paresthesias, perianal numbness, tingling, weakness or weight loss. (The patient is been notice some balance issues that are intermittently and mild) Risk factors include poor posture, lack of exercise and sedentary lifestyle. He has tried analgesics and NSAIDs for the symptoms. The treatment provided no relief.      Past medical history, past social history was reviewed and discussed with the patient.    Review of Systems   Constitutional: Negative for activity change, appetite change and weight loss.   HENT: Negative for congestion and ear discharge.    Eyes:  Negative for discharge and itching.   Respiratory: Negative for choking and chest tightness.    Cardiovascular: Negative for chest pain and leg swelling.   Gastrointestinal: Negative for abdominal distention, abdominal pain and bowel incontinence.   Endocrine: Negative for cold intolerance and heat intolerance.   Genitourinary: Negative for bladder incontinence, dysuria and flank pain.   Musculoskeletal: Positive for arthralgias and back pain.   Skin: Negative for pallor and rash.   Allergic/Immunologic: Negative for environmental allergies and food allergies.   Neurological: Negative for dizziness, tingling, facial asymmetry, weakness, numbness, headaches and paresthesias.   Hematological: Negative for adenopathy. Does not bruise/bleed easily.   Psychiatric/Behavioral: Negative for agitation and confusion.       Objective:      Physical Exam   Constitutional: No distress.   Neurological: He is alert.         Assessment and plan:    Chronic bilateral low back pain with bilateral sciatica:  Worsening  -     HYDROcodone-acetaminophen (NORCO) 5-325 mg per tablet; Take 1 tablet by mouth every 8 (eight) hours as needed for Pain.  Dispense: 21 tablet; Refill: 0  -     Ambulatory referral/consult to Pain Clinic; Future; Expected date: 04/09/2020    Cigarette smoker: worsening    Balance problem:  New problem, next visit workup  -     Ambulatory referral/consult to Pain Clinic; Future; Expected date: 04/09/2020      Patient stated that the symptoms are not controlled with over-the-counter Tylenol and ibuprofen.  Will prescribe hydrocodone 5/325, [place] prescription monitoring program was checked and okay.  Will refer the patient back to the pain management specialist.  The patient was strongly advised to quit tobacco, he is in the contemplation phase.  Patient agreed with assessment and plan. Patient verbalized understanding.

## 2020-04-08 ENCOUNTER — TELEPHONE (OUTPATIENT)
Dept: FAMILY MEDICINE | Facility: CLINIC | Age: 70
End: 2020-04-08

## 2020-04-08 DIAGNOSIS — G89.29 CHRONIC BILATERAL LOW BACK PAIN WITH BILATERAL SCIATICA: Primary | ICD-10-CM

## 2020-04-08 DIAGNOSIS — M54.6 THORACIC SPINE PAIN: ICD-10-CM

## 2020-04-08 DIAGNOSIS — M54.42 CHRONIC BILATERAL LOW BACK PAIN WITH BILATERAL SCIATICA: Primary | ICD-10-CM

## 2020-04-08 DIAGNOSIS — M54.41 CHRONIC BILATERAL LOW BACK PAIN WITH BILATERAL SCIATICA: Primary | ICD-10-CM

## 2020-04-08 NOTE — TELEPHONE ENCOUNTER
----- Message from Yadira Danielle MA sent at 4/8/2020 11:52 AM CDT -----  Contact: self/[phone]  Who Called: Patient.  Best Call Back Number:[phone]  Additional Information : Pt stated he is in lots of pain and stated he did received pain medication but does not seem to be working. Please call back to discuss.

## 2020-04-08 NOTE — TELEPHONE ENCOUNTER
Spoke with pt. He is still having problems with back and  Shoulder on the rt side. He said he would like to go to get xrays. He doesn't want to come to [place]. He was wanting to know if he could get xrays in [place].  I do not know of any xry place close to our [place] [facility].

## 2020-04-08 NOTE — TELEPHONE ENCOUNTER
So.  He was supposed to have a chest CT and an MRI of the neck that was ordered, he already had x-rays of the shoulder, can you please ask him if the symptoms are severe, in that case he needs to go to the emergency department.  I can place orders for the chest CT as urgent if is having worsening symptoms?  Please let me know.  Thank you

## 2020-04-08 NOTE — TELEPHONE ENCOUNTER
Patient states the symptoms are getting worse. He rates the pain as a 7. States the pain isn't severe. Please advise?

## 2020-04-08 NOTE — TELEPHONE ENCOUNTER
Pt will come tomorrow for xrays please put in the order thank you. Now he says it for his rt shoulder and upper back pain.

## 2020-04-08 NOTE — TELEPHONE ENCOUNTER
I will place orders, can you let him know that is not places close to [place], if can come to the office, I will place orders, please schedule the patient.  Thank you

## 2020-04-09 ENCOUNTER — TELEPHONE (OUTPATIENT)
Dept: FAMILY MEDICINE | Facility: CLINIC | Age: 70
End: 2020-04-09

## 2020-04-09 DIAGNOSIS — M54.41 CHRONIC BILATERAL LOW BACK PAIN WITH BILATERAL SCIATICA: ICD-10-CM

## 2020-04-09 DIAGNOSIS — G89.29 CHRONIC BILATERAL LOW BACK PAIN WITH BILATERAL SCIATICA: ICD-10-CM

## 2020-04-09 DIAGNOSIS — M54.42 CHRONIC BILATERAL LOW BACK PAIN WITH BILATERAL SCIATICA: ICD-10-CM

## 2020-04-09 RX ORDER — HYDROCODONE BITARTRATE AND ACETAMINOPHEN 5; 325 MG/1; MG/1
1 TABLET ORAL EVERY 8 HOURS PRN
Qty: 21 TABLET | Refills: 0 | OUTPATIENT
Start: 2020-04-09 | End: 2020-04-16

## 2020-04-09 NOTE — TELEPHONE ENCOUNTER
Can you please schedule the patient to see the pain management doctor, they are doing telemedicine visit now, please make an appointment as soon as possible the referral was there already.  Thank you

## 2020-04-09 NOTE — TELEPHONE ENCOUNTER
I just refilled the medication on 04/02/2020, I will not be able to refill the medication, he will need to go to pain management for this, if any severe symptoms, he needs to go to the emergency room.  I will contact the pain management doctor to see if they can see him before.  Thank you.

## 2020-04-09 NOTE — TELEPHONE ENCOUNTER
Pt states he is taking his norco 3 x daily. He is out of med. Req refill. Pain mgt appt is 5/1/20. Please advise.

## 2020-04-09 NOTE — TELEPHONE ENCOUNTER
Dr. Velázquez,  I don't know why, but a new referral is needed.  I just attempted to schedule the patient and it would not allow me stating that the referral was cancelled.

## 2020-04-09 NOTE — TELEPHONE ENCOUNTER
----- Message from Tricia Reilly sent at 4/9/2020  4:33 PM CDT -----  Type: Needs Medical Advice  Who Called:  patient  Best Call Back Number: [phone]  Additional Information: patient scheduled appt for 05/01/2020 with pain management. Patient is requesting pain medication until he is able to be seen. Please give call back

## 2020-04-09 NOTE — TELEPHONE ENCOUNTER
----- Message from Susana Bella sent at 4/9/2020 11:56 AM CDT -----  Contact: Patient  Type: Needs Medical Advice  Who Called:  Patient  Best Call Back Number:[phone]  Additional Information:Patient is calling to speak with someone in regards to a chest Xray he was supposed to have ordered.Please call back and advise.

## 2020-04-13 ENCOUNTER — TELEPHONE (OUTPATIENT)
Dept: FAMILY MEDICINE | Facility: CLINIC | Age: 70
End: 2020-04-13

## 2020-04-13 NOTE — TELEPHONE ENCOUNTER
----- Message from Marcelle Maurer sent at 4/13/2020  9:52 AM CDT -----  Contact: self  Type: Needs Medical Advice  Who Called:  self  Symptoms (please be specific):  Back pain  How long has patient had these symptoms:    Pharmacy name and phone #:    [id number] - [place], [place] - [address] [facility] [address]  [address]  [facility] [address]  Phone: [phone] Fax: [phone]  Best Call Back Number: [phone] (home)   Additional Information: Patient is calling for pain medication and orders for imaging. He would like to go to [place]. Patient states he has stents and the pain feels the same as last time. Please call patient. Thanks!

## 2020-04-14 ENCOUNTER — OFFICE VISIT (OUTPATIENT)
Dept: FAMILY MEDICINE | Facility: CLINIC | Age: 70
End: 2020-04-14
Payer: MEDICARE

## 2020-04-14 ENCOUNTER — TELEPHONE (OUTPATIENT)
Dept: FAMILY MEDICINE | Facility: CLINIC | Age: 70
End: 2020-04-14

## 2020-04-14 DIAGNOSIS — M54.42 CHRONIC BILATERAL LOW BACK PAIN WITH BILATERAL SCIATICA: ICD-10-CM

## 2020-04-14 DIAGNOSIS — M54.12 CERVICAL RADICULOPATHY: Primary | ICD-10-CM

## 2020-04-14 DIAGNOSIS — G89.29 CHRONIC BILATERAL LOW BACK PAIN WITH BILATERAL SCIATICA: ICD-10-CM

## 2020-04-14 DIAGNOSIS — E61.1 IRON DEFICIENCY: ICD-10-CM

## 2020-04-14 DIAGNOSIS — I10 ESSENTIAL HYPERTENSION: ICD-10-CM

## 2020-04-14 DIAGNOSIS — M54.41 CHRONIC BILATERAL LOW BACK PAIN WITH BILATERAL SCIATICA: ICD-10-CM

## 2020-04-14 DIAGNOSIS — R60.0 LOCALIZED EDEMA: ICD-10-CM

## 2020-04-14 PROCEDURE — 99443 PR PHYSICIAN TELEPHONE EVALUATION 21-30 MIN: ICD-10-PCS | Mod: 95,,, | Performed by: FAMILY MEDICINE

## 2020-04-14 PROCEDURE — 99443 PR PHYSICIAN TELEPHONE EVALUATION 21-30 MIN: CPT | Mod: 95,,, | Performed by: FAMILY MEDICINE

## 2020-04-14 RX ORDER — HYDROCODONE BITARTRATE AND ACETAMINOPHEN 5; 325 MG/1; MG/1
1 TABLET ORAL EVERY 12 HOURS PRN
Qty: 14 TABLET | Refills: 0 | Status: SHIPPED | OUTPATIENT
Start: 2020-04-14 | End: 2020-04-21

## 2020-04-14 RX ORDER — FERROUS SULFATE 325(65) MG
1 TABLET ORAL DAILY
Qty: 30 TABLET | Refills: 5 | Status: SHIPPED | OUTPATIENT
Start: 2020-04-14 | End: 2021-02-26 | Stop reason: SDUPTHER

## 2020-04-14 RX ORDER — POTASSIUM CHLORIDE 20 MEQ/1
20 TABLET, EXTENDED RELEASE ORAL DAILY
Qty: 30 TABLET | Refills: 5 | Status: SHIPPED | OUTPATIENT
Start: 2020-04-14 | End: 2020-10-13

## 2020-04-14 NOTE — PATIENT INSTRUCTIONS
Eating Heart-Healthy Food: Using the DASH Plan    Eating for your heart doesnt have to be hard or boring. You just need to know how to make healthier choices. The DASH eating plan has been developed to help you do just that. DASH stands for Dietary Approaches to Stop Hypertension. It is a plan that has been proven to be healthier for your heart and to lower your risk for high blood pressure. It can also help lower your risk for cancer, heart disease, osteoporosis, and diabetes.  Choosing from each food group  Choose foods from each of the food groups below each day. Try to get the recommended number of servings for each food group. The serving numbers are based on a diet of 2,000 calories a day. Talk to your doctor if youre unsure about your calorie needs. Along with getting the correct servings, the DASH plan also recommends a sodium intake less than 2,300 mg per day.        Grains  Servings: 6 to 8 a day  A serving is:  · 1 slice bread  · 1 ounce dry cereal  · Half a cup cooked rice, pasta or cereal  Best choices: Whole grains and any grains high in fiber. Vegetables  Servings: 4 to 5 a day  A serving is:  · 1 cup raw leafy vegetable  · Half a cup cut-up raw or cooked vegetable  · Half a cup vegetable juice  Best choices: Fresh or frozen vegetables prepared without added salt or fat.   Fruits  Servings: 4 to 5 a day  A serving is:  · 1 medium fruit  · One-quarter cup dried fruit  · Half a cup fresh, frozen, or canned fruit  · Half a cup of 100% fruit juices  Best choices: A variety of fresh fruits of different colors. Whole fruits are a better choice than fruit juices. Low-fat or fat-free dairy  Servings: 2 to 3 a day  A serving is:  · 1 cup milk  · 1 cup yogurt  · One and a half ounces cheese  Best choices: Skim or 1% milk, low-fat or fat-free yogurt or buttermilk, and low-fat cheeses.         Lean meats, poultry, fish  Servings: 6 or fewer a day  A serving is:  · 1 ounce cooked meats, poultry, or fish  · 1  egg  Best choices: Lean poultry and fish. Trim away visible fat. Broil, grill, roast, or boil instead of frying. Remove skin from poultry before eating. Limit how much red meat you eat.  Nuts, seeds, beans  Servings: 4 to 5 a week  A serving is:  · One-third cup nuts (one and a half ounces)  · 2 tablespoons nut butter or seeds  · Half a cup cooked dry beans or legumes  Best choices: Dry roasted nuts with no salt added, lentils, kidney beans, garbanzo beans, and whole angela beans.   Fats and oils  Servings: 2 to 3 a day  A serving is:  · 1 teaspoon vegetable oil  · 1 teaspoon soft margarine  · 1 tablespoon mayonnaise  · 2 tablespoons salad dressing  Best choices: Nut and vegetable oils (nontropical vegetable oils), such as olive and canola oil. Sweets  Servings: 5 a week or fewer  A serving is:  · 1 tablespoon sugar, maple syrup, or honey  · 1 tablespoon jam or jelly  · 1 half-ounce jelly beans (about 15)  · 1 cup lemonade  Best choices: Dried fruit can be a satisfying sweet. Choose low-fat sweets. And watch your serving sizes!      For more on the DASH eating plan, visit:  www.nhlbi.nih.gov/health/health-topics/topics/dash   Date Last Reviewed: 6/1/2016  © 2536-0564 [employer]. [address] [place] [address], [place] [address]. All rights reserved. This information is not intended as a substitute for professional medical care. Always follow your healthcare professional's instructions.

## 2020-04-14 NOTE — PROGRESS NOTES
Subjective:       Patient ID: Alexis Medrano Jr. is a 69 y.o. male.    Chief Complaint: Back Pain; Neck Pain; and Medication Refill    HPI     The patient location is:  [place]  The chief complaint leading to consultation is:  Back pain, neck pain, medication refill  Visit type: Virtual visit with synchronous audio and audio  Total time spent with patient: 25 min  Each patient to whom he or she provides medical services by telemedicine is:  (1) informed of the relationship between the physician and patient and the respective role of any other health care provider with respect to management of the patient; and (2) notified that he or she may decline to receive medical services by telemedicine and may withdraw from such care at any time.    Notes:     Back pain:  The patient complains of severe pain on the lower back that is radiating to bilateral lower extremities, the patient stated the symptoms are getting worse.  The patient has been taking hydrocodone every 8 hr, he states that he ran out of the medication and he is in excruciating pain.  The patient was given appointment to see the pain management doctor but states that the appointment was canceled multiple times due to the COVID 19 concern.  The patient states that the pain is 10-10 today and not able to controlled with over-the-counter medications.    Neck pain:  The patient complains of pain on the neck that is radiating to level left upper extremity.  The patient had an appointment for MRI that also was reschedule secondary to the COVID-19 concern.  The patient complains of pain that is severe and no improvement with over-the-counter medications.  The patient also has coronary artery disease, he is currently seen the cardiologist, denies any chest pain at these encounter.    Edema:  The patient stated that he takes Lasix, and was taking also potassium tablets, but stated that when he tried to refill the medication, was not able to, and he needs a new  prescription for iron tablets.  The last blood work revealed that the potassium levels were normal.    Iron deficiency:  The patient also has iron deficiency and needs iron tablets to be refill in the pharmacy.    Hypertension:  The patient has been checking his blood pressure home and is been normal range.    Past medical history, past social history was reviewed and discussed with the patient.    Review of Systems   Constitutional: Positive for activity change. Negative for appetite change.   HENT: Negative for congestion and ear discharge.    Eyes: Negative for discharge and itching.   Respiratory: Negative for choking and chest tightness.    Cardiovascular: Negative for chest pain and leg swelling.   Gastrointestinal: Negative for abdominal distention and abdominal pain.   Endocrine: Negative for cold intolerance and heat intolerance.   Genitourinary: Negative for dysuria and flank pain.   Musculoskeletal: Positive for arthralgias, back pain, neck pain and neck stiffness.   Skin: Negative for pallor and rash.   Allergic/Immunologic: Negative for environmental allergies and food allergies.   Neurological: Negative for dizziness and facial asymmetry.   Hematological: Negative for adenopathy. Does not bruise/bleed easily.   Psychiatric/Behavioral: Negative for agitation and confusion.       Objective:      Physical Exam   Constitutional: He appears distressed.   Neurological: He is alert.       Assessment:       1. Cervical radiculopathy    2. Chronic bilateral low back pain with bilateral sciatica    3. Localized edema    4. Iron deficiency    5. Other secondary hypertension        Plan:       Cervical radiculopathy:  Worsening  -     HYDROcodone-acetaminophen (NORCO) 5-325 mg per tablet; Take 1 tablet by mouth every 12 (twelve) hours as needed for Pain.  Dispense: 14 tablet; Refill: 0    Chronic bilateral low back pain with bilateral sciatica:  Worsening  -     HYDROcodone-acetaminophen (NORCO) 5-325 mg per  tablet; Take 1 tablet by mouth every 12 (twelve) hours as needed for Pain.  Dispense: 14 tablet; Refill: 0    Localized edema:  Stable  -     potassium chloride SA (K-DUR,KLOR-CON) 20 MEQ tablet; Take 1 tablet (20 mEq total) by mouth once daily.  Dispense: 30 tablet; Refill: 5    Iron deficiency:  Stable  -     ferrous sulfate (FEOSOL) 325 mg (65 mg iron) Tab tablet; Take 1 tablet (325 mg total) by mouth once daily.  Dispense: 30 tablet; Refill: 5    Hypertension:  Stable    [place] prescription monitoring program was checked and okay.  The patient was advised to follow with a pain management as directed, I told the patient that I will not be able to prescribe medication for him and pain management will take over for his chronic pain on the back and on the neck, he agree with that.  He will make sure to follow-up on his appointment on May 1, 2020 with pain management.  I advised the patient that we can try some gabapentin but he preferred to wait until recommendations from the pain management doctor.  Will continue with potassium tablets and also iron tablets, medications were filled also.  The patient will return to the office next month and will order blood work for him.  If the patient develops any symptoms of chest pain, he was advised to go to the emergency department immediately.  He will also contact the pulmonologist and the cardiologist to reschedule his appointments after COVID-19 situation improved and he is able to come to the office.  Patient agreed with assessment and plan. Patient verbalized understanding.

## 2020-04-15 ENCOUNTER — PATIENT OUTREACH (OUTPATIENT)
Dept: ADMINISTRATIVE | Facility: OTHER | Age: 70
End: 2020-04-15

## 2020-04-15 NOTE — LETTER
AUTHORIZATION FOR RELEASE OF   CONFIDENTIAL INFORMATION    Dear Alexis Tee Jr,  1950, is a patient of Dr. Kalani Velázquez, (PCP) at [facility]. While reviewing his/her chart, it has come to our attention that there is an outstanding lab/procedure. Please help keep our Health Maintenance records as accurate and as up to date as possible by supplying the following:     Colonoscopy                                                                             Please fax to [facility] @ [phone].    Thank you for your assistance in our patient's healthcare.     Sincerely,     Kendra Milligan, POE CCC

## 2020-04-16 ENCOUNTER — TELEPHONE (OUTPATIENT)
Dept: PAIN MEDICINE | Facility: CLINIC | Age: 70
End: 2020-04-16

## 2020-04-17 RX ORDER — GABAPENTIN 300 MG/1
300 CAPSULE ORAL 2 TIMES DAILY
Qty: 60 CAPSULE | Refills: 2 | Status: SHIPPED | OUTPATIENT
Start: 2020-04-17 | End: 2022-03-10 | Stop reason: ALTCHOICE

## 2020-04-17 RX ORDER — IBUPROFEN 400 MG/1
400 TABLET ORAL 2 TIMES DAILY PRN
Qty: 40 TABLET | Refills: 0 | Status: SHIPPED | OUTPATIENT
Start: 2020-04-17 | End: 2021-10-13

## 2020-04-22 NOTE — TELEPHONE ENCOUNTER
----- Message from Marily Grey sent at 4/22/2020 11:33 AM CDT -----  Contact: patient  Type: Needs Medical Advice  Who Called:  patient  Pharmacy name and phone #:  [facility]  Best Call Back Number: [phone] (home)   Additional Information: patient would like the Dr to refill the Rx   atenolol (TENORMIN) 50 MG tablet today pleasethe

## 2020-04-23 NOTE — TELEPHONE ENCOUNTER
----- Message from Cornelia Hanna MA sent at 4/23/2020 11:05 AM CDT -----  Contact: self  Patient need to speak to nurse regarding he is currently out of blood pressure medication and put in request since last week and yesterday per patient     Patient states pharmacy still do not have refill request     [id number] - [place] - [facility] [address]  [place] [address]  Phone: [phone] Fax: [phone]      Patient requesting nurse to call him to confirm medication and message was delivered please at [phone] (home)

## 2020-04-23 NOTE — PROGRESS NOTES
Refill Routing Note   Medication(s) are not appropriate for processing by [facility]:       Medication not previously prescribed by PCP               Medication reconciliation completed: No        Appointments [id number] or future 3m with PCP    Date Provider   Last Visit   4/14/2020 Kalani Velázquez MD   Next Visit   5/11/2020 Kalani Velázquez MD     Automatic Epic Protocol Generated Data:    Requested Prescriptions   Pending Prescriptions Disp Refills    atenoloL (TENORMIN) 50 MG tablet       Sig: Take 1 tablet (50 mg total) by mouth once daily.       Cardiovascular:  Beta Blockers Passed - 4/23/2020  1:53 PM        Passed - Patient is at least 18 years old        Passed - Last BP in normal range within 360 days.     BP Readings from Last 3 Encounters:   03/18/20 110/62   03/13/20 138/86   02/06/20 114/86              Passed - Last Heart Rate in normal range within 360 days.     Pulse Readings from Last 3 Encounters:   03/18/20 68   03/13/20 70   02/06/20 59             Passed - Office visit in past 12 months or future 90 days.     Recent Outpatient Visits            1 week ago Cervical radiculopathy    [facility] Kalani Velázquez MD    3 weeks ago Chronic bilateral low back pain with bilateral sciatica    [facility] Kalani Velázquez MD    1 month ago Coronary artery disease of bypass graft of native heart with stable angina pectoris    [facility] Teagan Rivera MD    1 month ago Pulmonary interstitial fibrosis    [facility] Kalani Velázquez MD    2 months ago Cervical radiculitis    [facility] Kalani Velázquez MD          Future Appointments              In 6 days VASCULAR, [facility] - Cardiology, [place]    In 6 days NM1 [facility] [place]    In 6 days STRESS, MARJ [place] - Card[facility], [place]    In 6 days NM1 [facility] [place]    In 6 days STRESS ECHO,  [facility]ALISHA [place] - [place], [place]    In 1 week Marquis Hong MD [place] - Pain Management, [place]    In 2 weeks Kalani Velázquez MD [facility], [place]    In 4 weeks RESPIRATORY THERAPY, [facility] PULMONARY [facility], [facility]    In 4 weeks Radha Horvath MD [facility], [facility]    In 1 month [facility] CT1 LIMIT 500 LBS [facility] [place]    In 1 month Kalani Velázquez MD [facility], [place]    In 5 months Teagan Rivera MD [facility], [facility]                [employer] - 4/23/2020  1:53 PM        The requested medication is on the active medication list without a start date.           Note composed:2:02 PM 04/23/2020

## 2020-04-24 ENCOUNTER — TELEPHONE (OUTPATIENT)
Dept: FAMILY MEDICINE | Facility: CLINIC | Age: 70
End: 2020-04-24

## 2020-04-24 DIAGNOSIS — I10 ESSENTIAL HYPERTENSION: ICD-10-CM

## 2020-04-24 RX ORDER — ATENOLOL 50 MG/1
50 TABLET ORAL DAILY
Qty: 90 TABLET | Refills: 3 | Status: SHIPPED | OUTPATIENT
Start: 2020-04-24 | End: 2020-05-29 | Stop reason: CLARIF

## 2020-04-24 RX ORDER — TELMISARTAN 40 MG/1
40 TABLET ORAL DAILY
Qty: 90 TABLET | Refills: 3 | Status: SHIPPED | OUTPATIENT
Start: 2020-04-24 | End: 2020-11-06 | Stop reason: DRUGHIGH

## 2020-04-24 NOTE — TELEPHONE ENCOUNTER
----- Message from Estefania Patten LPN sent at 4/24/2020 10:18 AM CDT -----  Contact: patient  I called patient and rescheduled his appointment with us. He also requested a refill on his atenolol from Dr Velázquez.

## 2020-04-24 NOTE — TELEPHONE ENCOUNTER
----- Message from Keenan Obrien sent at 4/24/2020 12:24 PM CDT -----  Type:  RX Refill Request    Who Called:  Patient  Refill or New Rx:  Refill  RX Name and Strength:    telmisartan (MICARDIS) 40 MG Tab , 1XDay, 30  atenolol (TENORMIN) 50 MG tablet  , 1XDay, 30    Preferred Pharmacy with phone number:    [facility][id number] [address] [address]  Phone: [phone] Fax: [phone]    Local or Mail Order:Local  Ordering Provider:  Melania Marie Call Back Number:  [phone]  Additional Information:

## 2020-04-24 NOTE — TELEPHONE ENCOUNTER
----- Message from Susana Bella sent at 4/24/2020  7:57 AM CDT -----  Contact: Patient  Type: Needs Medical Advice  Who Called:  Patient  Pharmacy name and phone #:  [id number] - [place] - [address] [facility] [address]  [address]  [place] [place] [address]  Phone: [phone] Fax: [phone]  Best Call Back Number: [phone]  Additional Information: Patient is stating that he has been having issues with  Getting his atenolol (TENORMIN) 50 MG tablet refilled.Please call back and advise.

## 2020-04-26 RX ORDER — ATENOLOL 50 MG/1
50 TABLET ORAL DAILY
Qty: 30 TABLET | Refills: 5 | Status: SHIPPED | OUTPATIENT
Start: 2020-04-26 | End: 2020-10-23 | Stop reason: DRUGHIGH

## 2020-05-11 ENCOUNTER — TELEPHONE (OUTPATIENT)
Dept: FAMILY MEDICINE | Facility: CLINIC | Age: 70
End: 2020-05-11

## 2020-05-11 NOTE — TELEPHONE ENCOUNTER
He will need an appointment so I can assess him and talked to him.  I will be doing telemedicine visit tomorrow if he wants to, please schedule an appointment if he agrees.  Over-the-counter he can take Tylenol extra-strength 500 mg every 6 hr and antihistamine like Allegra if he is having any runny nose, congestion and use a nasal saline solution.  Until I can assess him.  Thank you

## 2020-05-11 NOTE — TELEPHONE ENCOUNTER
Spoke with patient c/o left ear pain and sore throat for a few days. Wanting a prescription called into [facility] on chart. Refused an appointment or audio visit. Please advise

## 2020-05-11 NOTE — TELEPHONE ENCOUNTER
----- Message from Rah Myers sent at 5/11/2020  3:43 PM CDT -----  Contact: patient  Type: Needs Medical Advice  Who Called:  patient  Symptoms (please be specific):    How long has patient had these symptoms:    Pharmacy name and phone #:    [facility] Call Back Number: [phone]  Additional Information: called to advise that medication gabapentin is not working, has sore throat. Requesting a call back

## 2020-05-12 ENCOUNTER — OFFICE VISIT (OUTPATIENT)
Dept: FAMILY MEDICINE | Facility: CLINIC | Age: 70
End: 2020-05-12
Payer: MEDICARE

## 2020-05-12 VITALS — SYSTOLIC BLOOD PRESSURE: 120 MMHG | DIASTOLIC BLOOD PRESSURE: 72 MMHG | HEART RATE: 70 BPM

## 2020-05-12 DIAGNOSIS — M54.41 CHRONIC BILATERAL LOW BACK PAIN WITH BILATERAL SCIATICA: ICD-10-CM

## 2020-05-12 DIAGNOSIS — J30.1 NON-SEASONAL ALLERGIC RHINITIS DUE TO POLLEN: ICD-10-CM

## 2020-05-12 DIAGNOSIS — M54.42 CHRONIC BILATERAL LOW BACK PAIN WITH BILATERAL SCIATICA: ICD-10-CM

## 2020-05-12 DIAGNOSIS — G89.29 CHRONIC BILATERAL LOW BACK PAIN WITH BILATERAL SCIATICA: ICD-10-CM

## 2020-05-12 DIAGNOSIS — F17.200 TOBACCO DEPENDENCE: ICD-10-CM

## 2020-05-12 DIAGNOSIS — M50.30 DEGENERATIVE DISC DISEASE, CERVICAL: ICD-10-CM

## 2020-05-12 DIAGNOSIS — J02.9 SORE THROAT: Primary | ICD-10-CM

## 2020-05-12 PROCEDURE — 99442 PR PHYSICIAN TELEPHONE EVALUATION 11-20 MIN: ICD-10-PCS | Mod: 95,,, | Performed by: FAMILY MEDICINE

## 2020-05-12 PROCEDURE — 99442 PR PHYSICIAN TELEPHONE EVALUATION 11-20 MIN: CPT | Mod: 95,,, | Performed by: FAMILY MEDICINE

## 2020-05-12 RX ORDER — FLUTICASONE PROPIONATE 50 MCG
1 SPRAY, SUSPENSION (ML) NASAL DAILY
Qty: 18.2 ML | Refills: 0 | Status: SHIPPED | OUTPATIENT
Start: 2020-05-12 | End: 2020-06-08

## 2020-05-12 RX ORDER — HYDROCODONE BITARTRATE AND ACETAMINOPHEN 5; 325 MG/1; MG/1
1 TABLET ORAL EVERY 8 HOURS PRN
Qty: 21 TABLET | Refills: 0 | Status: SHIPPED | OUTPATIENT
Start: 2020-05-12 | End: 2020-05-19

## 2020-05-12 NOTE — PROGRESS NOTES
Established Patient - Audio Only Telehealth Visit     The patient location is:  Home  The chief complaint leading to consultation is:  Sore throat  Visit type: Virtual visit with audio only (telephone)  Total time spent with patient:  15 min       The reason for the audio only service rather than synchronous audio and video virtual visit was related to technical difficulties or patient preference/necessity.     Each patient to whom I provide medical services by telemedicine is:  (1) informed of the relationship between the physician and patient and the respective role of any other health care provider with respect to management of the patient; and (2) notified that they may decline to receive medical services by telemedicine and may withdraw from such care at any time. Patient verbally consented to receive this service via voice-only telephone call.       HPI:  The patient is here today complaining of sore throat associated with nasal congestion, postnasal drip, the symptoms are improved since he start using fluticasone and also gargles with salt and water.  The patient stated that he will ran out of the fluticasone and he needs a new prescription.  He is still using the other medications and seemed to improve some of the symptoms of allergies.  The patient also complains of severe chronic lower back pain that is not relieved with over-the-counter medications, the patient was taking hydrocodone and is asking for a new prescription until he sees the pain management specialist.  The patient stated that he is trying to take the medication only for severe pain.  The patient noticed that he is having some weakness on the lower extremities.  He is also asking for a cane.    Past medical history, past social history was reviewed and discussed with the patient.    Review of Systems   Constitutional: Negative for chills and fever.   HENT: Positive for congestion and sore throat.    Eyes: Negative for blurred vision and  double vision.   Respiratory: Negative for cough and hemoptysis.    Cardiovascular: Negative for chest pain and palpitations.   Gastrointestinal: Negative for heartburn and nausea.   Genitourinary: Negative for dysuria and urgency.   Musculoskeletal: Positive for back pain and neck pain.   Skin: Negative for itching and rash.   Neurological: Negative for dizziness and headaches.     Physical exam:    The patient is no acute distress     Assessment and plan:      Sore throat:  Improved    Non-seasonal allergic rhinitis due to pollen:  Improved  -     fluticasone propionate (FLONASE) 50 mcg/actuation nasal spray; 1 spray (50 mcg total) by Each Nostril route once daily.  Dispense: 18.2 mL; Refill: 0    Degenerative disc disease, cervical; stable  -     HYDROcodone-acetaminophen (NORCO) 5-325 mg per tablet; Take 1 tablet by mouth every 8 (eight) hours as needed for Pain.  Dispense: 21 tablet; Refill: 0  -     CANE FOR HOME USE    Tobacco dependence:  Worsening    Chronic bilateral low back pain with bilateral sciatica:  Worsening  -     HYDROcodone-acetaminophen (NORCO) 5-325 mg per tablet; Take 1 tablet by mouth every 8 (eight) hours as needed for Pain.  Dispense: 21 tablet; Refill: 0  -     CANE FOR HOME USE    [place] prescription monitoring was checked and okay.  Hydrocodone was prescribed until the patient can see the pain management doctor as he is having severe pain.  Will continue fluticasone, the patient was advised if any worsening of the symptoms, to let us know immediately, ER warning signs given to the patient.  The patient was strongly advised to quit tobacco, he is in contemplation phase.  This service was not originating from a related E/M service provided within the previous 7 days nor will  to an E/M service or procedure within the next 24 hours or my soonest available appointment.  Prevailing standard of care was able to be met in this audio-only visit.

## 2020-05-13 RX ORDER — FLUTICASONE PROPIONATE 50 MCG
SPRAY, SUSPENSION (ML) NASAL
Qty: 48 G | OUTPATIENT
Start: 2020-05-13

## 2020-05-14 NOTE — PROGRESS NOTES
Faustino DC. Duplicate Request   Refill Authorization Note    is requesting a refill authorization.    Brief assessment and rationale for refill: QUICK DC: duplicate               Medication reconciliation completed: No                          Comments:   Pended Medication(s)   Requested Prescriptions     Refused Prescriptions Disp Refills    fluticasone propionate (FLONASE) 50 mcg/actuation nasal spray [Pharmacy Med Name: FLUTICASONE 50MCG NASAL SP (120) RX] 48 g      Sig: SHAKE LIQUID AND USE 1 SPRAY(50 MCG) IN EACH NOSTRIL EVERY DAY     Refused By: NIDIA RANGEL     Reason for Refusal: Duplicate        Duplicate Pended Encounter(s)/ Last Prescribed Details:    Ordering Provider: -- LIBORIO #:  -- NPI:  --    Authorizing Provider: Kalani Velázquez MD LIBORIO #:  [id number] NPI:  [id number]    Ordering User:  Kalani Velázquez MD            Diagnosis Association: Non-seasonal allergic rhinitis due to pollen (J30.1)      [id number] [address] LIBORIO #:  [id number]     Pharmacy Comments:  --          Fill quantity remaining:  -- Fill quantity used:  -- Next fill due: --       Outpatient Medication Detail      Disp Refills Start End    fluticasone propionate (FLONASE) 50 mcg/actuation nasal spray 18.2 mL 0 5/12/2020     Sig - Route: 1 spray (50 mcg total) by Each Nostril route once daily. - Each Nostril    Sent to pharmacy as: fluticasone propionate (FLONASE) 50 mcg/actuation nasal spray    E-Prescribing Status: Receipt confirmed by pharmacy (5/12/2020  8:05 AM CDT)         Note composed:11:02 PM 05/13/2020

## 2020-05-21 PROBLEM — Z77.090 ASBESTOS EXPOSURE: Status: ACTIVE | Noted: 2020-05-21

## 2020-05-21 PROBLEM — J44.9 CHRONIC OBSTRUCTIVE PULMONARY DISEASE: Status: ACTIVE | Noted: 2020-05-21

## 2020-05-21 PROBLEM — R93.89 ABNORMAL CHEST X-RAY: Status: ACTIVE | Noted: 2020-05-21

## 2020-05-25 ENCOUNTER — TELEPHONE (OUTPATIENT)
Dept: FAMILY MEDICINE | Facility: CLINIC | Age: 70
End: 2020-05-25

## 2020-05-25 ENCOUNTER — TELEPHONE (OUTPATIENT)
Dept: PAIN MEDICINE | Facility: CLINIC | Age: 70
End: 2020-05-25

## 2020-05-25 DIAGNOSIS — G89.29 CHRONIC BILATERAL LOW BACK PAIN WITH BILATERAL SCIATICA: Primary | ICD-10-CM

## 2020-05-25 DIAGNOSIS — M50.30 DEGENERATIVE DISC DISEASE, CERVICAL: ICD-10-CM

## 2020-05-25 DIAGNOSIS — M54.42 CHRONIC BILATERAL LOW BACK PAIN WITH BILATERAL SCIATICA: Primary | ICD-10-CM

## 2020-05-25 DIAGNOSIS — M54.41 CHRONIC BILATERAL LOW BACK PAIN WITH BILATERAL SCIATICA: Primary | ICD-10-CM

## 2020-05-25 NOTE — TELEPHONE ENCOUNTER
----- Message from Alfonso Rodas sent at 5/25/2020  8:33 AM CDT -----  Contact: Ptnt   [phone]  Type: Needs Medical Advice    Who Called: Ptnt   [phone]    Symptoms (please be specific):  Has back pain.    How long has patient had these symptoms:  For quite a while now.    Pharmacy name and phone #:    [facility] #[id number] [place], [place] - [address] [place] [address]  Phone: [phone] Fax: [phone]    Best Call Back Number: Ptnt   [phone]    Additional Information: Advised would like an appmnt scheduled in June if possible. Please call.

## 2020-05-25 NOTE — TELEPHONE ENCOUNTER
Spoke with patient. Stated his back hurts and having increased pain with walking. Requesting a Rolator walker. Order pended

## 2020-05-25 NOTE — TELEPHONE ENCOUNTER
----- Message from Alfonso Rodas sent at 5/25/2020  8:13 AM CDT -----  Contact: Ptnt   [phone]  Type: Needs Medical Advice    Who Called: Ptnt   [phone]    Symptoms (please be specific):  Back pain, weak legs not walking well.    How long has patient had these symptoms:  Since last Thursday.    Pharmacy name and phone #:      [facility] #[id number] - [address] [address]  Phone: [phone] Fax: [phone]    Best Call Back Number: Ptnt   [phone]    Additional Information:   Advised needs to speak with the nurse about a walker / rolator that Dr Velázquez was to have ordered for him. Please call.

## 2020-05-27 ENCOUNTER — TELEPHONE (OUTPATIENT)
Dept: GASTROENTEROLOGY | Facility: CLINIC | Age: 70
End: 2020-05-27

## 2020-05-28 ENCOUNTER — TELEPHONE (OUTPATIENT)
Dept: FAMILY MEDICINE | Facility: CLINIC | Age: 70
End: 2020-05-28

## 2020-05-28 NOTE — TELEPHONE ENCOUNTER
Called and spoke with patient. Told him that Dr. Velázquez doesn't go to hospital just clinic. He just wanted Dr. Velázquez to know that he was going to [facility] so they can get records if needed. Told patient that they can get records through system, we are connected.

## 2020-05-28 NOTE — TELEPHONE ENCOUNTER
----- Message from Ramone Connor sent at 5/28/2020  2:35 PM CDT -----  Contact: same  Patient called in to check the status of Dr. Velázquez admitting patient to [facility].  Patient stated he had spoken to someone earlier regarding this.    Patient call back number is [phone]

## 2020-05-28 NOTE — TELEPHONE ENCOUNTER
No.  I do not have any information of admitting patient, I only see patients in the clinic not in the hospital.  Thank you

## 2020-05-29 PROBLEM — R78.0 ELEVATED ETOH LEVEL: Status: ACTIVE | Noted: 2020-05-29

## 2020-05-29 PROBLEM — G82.20 ACUTE PARAPLEGIA: Status: ACTIVE | Noted: 2020-05-29

## 2020-06-02 ENCOUNTER — TELEPHONE (OUTPATIENT)
Dept: NEUROSURGERY | Facility: CLINIC | Age: 70
End: 2020-06-02

## 2020-06-02 DIAGNOSIS — Z98.1 S/P FUSION OF THORACIC SPINE: Primary | ICD-10-CM

## 2020-06-02 NOTE — TELEPHONE ENCOUNTER
----- Message from Clotilde Reich NP sent at 6/1/2020  2:57 PM CDT -----  He needs a wound check POD#10. DOS (5/29/20). He also needs 6 week post-op visit with upright thoracic xrays prior to appt. Thanks!

## 2020-06-06 DIAGNOSIS — J30.1 NON-SEASONAL ALLERGIC RHINITIS DUE TO POLLEN: ICD-10-CM

## 2020-06-08 ENCOUNTER — TELEPHONE (OUTPATIENT)
Dept: GASTROENTEROLOGY | Facility: CLINIC | Age: 70
End: 2020-06-08

## 2020-06-08 RX ORDER — FLUTICASONE PROPIONATE 50 MCG
SPRAY, SUSPENSION (ML) NASAL
Qty: 48 G | Refills: 0 | Status: SHIPPED | OUTPATIENT
Start: 2020-06-08

## 2020-06-08 RX ORDER — MONTELUKAST SODIUM 10 MG/1
TABLET ORAL
Qty: 90 TABLET | Refills: 0 | Status: SHIPPED | OUTPATIENT
Start: 2020-06-08 | End: 2021-09-29 | Stop reason: SDUPTHER

## 2020-06-08 NOTE — PROGRESS NOTES
Refill Routing Note    Medication(s) are not appropriate for processing by [facility]:       Medication is a new start (<3 months)        Medication Therapy Plan: Flonase first orderd by you 5/12/20 for 1 month supply with no refills; Pended 90 day supply; Defer to you in setting of new start  Medication reconciliation completed: No      Automatic Epic Protocol Generated Data:    Requested Prescriptions   Pending Prescriptions Disp Refills    fluticasone propionate (FLONASE) 50 mcg/actuation nasal spray [Pharmacy Med Name: FLUTICASONE 50MCG NASAL SP (120) RX] 48 g 0     Sig: SHAKE LIQUID AND USE 1 SPRAY(50 MCG) IN EACH NOSTRIL EVERY DAY       Ear, Nose, and Throat: Nasal Preparations - Corticosteroids Failed - 6/6/2020  6:11 AM        Failed - An appropriate indication is on the problem list     Allergic Rhinitis  Sinusitis  Seasonal Allergies              Passed - Patient is at least 18 years old        Passed - Office visit in past 12 months or future 90 days.     Recent Outpatient Visits            2 weeks ago Chronic obstructive pulmonary disease, unspecified COPD type    [facility] Radha Horvath MD    3 weeks ago Sore throat    [facility] Kalani Velázquez MD    1 month ago Cervical radiculopathy    [facility] Kalani Velázquez MD    2 months ago Chronic bilateral low back pain with bilateral sciatica    [facility] Kalani Velázquez MD    2 months ago Coronary artery disease of bypass graft of native heart with stable angina pectoris    [place] [facility] Teagan Rivera MD          Future Appointments              In 2 days NURSE, CN NEUROSURGERY [facility]    In 1 week Kalani Velázquez MD [facility]    In 1 month [facility]    In 1 month Elmer Connell MD [facility]    In 2 months  Radha Horvath MD [facility]    In 3 months Teagan Rivera MD [place] [facility], [facility]                   Appointments  past 12m or future 3m with PCP    Date Provider   Last Visit   5/12/2020 Kalani Velázqeuz MD   Next Visit   6/18/2020 Kalani Velázquez MD   ED visits in past 90 days: 0     Note composed:10:29 AM 06/08/2020

## 2020-06-23 ENCOUNTER — TELEPHONE (OUTPATIENT)
Dept: FAMILY MEDICINE | Facility: CLINIC | Age: 70
End: 2020-06-23

## 2020-06-23 DIAGNOSIS — M50.30 DEGENERATIVE DISC DISEASE, CERVICAL: Primary | ICD-10-CM

## 2020-06-23 DIAGNOSIS — Z98.1 S/P LAMINECTOMY WITH SPINAL FUSION: ICD-10-CM

## 2020-06-23 NOTE — TELEPHONE ENCOUNTER
----- Message from Tico Dubois sent at 6/23/2020 10:36 AM CDT -----  Regarding: rx  Contact: self  Type: Needs Medical Advice  Who Called:  self   Symptoms (please be specific):    How long has patient had these symptoms:    Pharmacy name and phone #:    Best Call Back Number:  [phone]- phone number in rehab/[phone]- best time to call is in the afternoon.  Additional Information: Patient will be discharged from rehab tomorrow.  Patient has back surgery .Patient requesting an order for three prone walking cane.

## 2020-06-24 NOTE — TELEPHONE ENCOUNTER
----- Message from Vicki Ortiz sent at 6/24/2020 12:55 PM CDT -----  Type: Needs Medical Advice  Who Called:  Patient  Best Call Back Number: [phone]  Additional Information: Per patient following up with message regarding prescription-please advised-thank you

## 2020-06-25 ENCOUNTER — TELEPHONE (OUTPATIENT)
Dept: FAMILY MEDICINE | Facility: CLINIC | Age: 70
End: 2020-06-25

## 2020-06-25 DIAGNOSIS — M50.30 DEGENERATIVE DISC DISEASE, CERVICAL: Primary | ICD-10-CM

## 2020-06-25 PROCEDURE — G0180 MD CERTIFICATION HHA PATIENT: HCPCS | Mod: ,,, | Performed by: STUDENT IN AN ORGANIZED HEALTH CARE EDUCATION/TRAINING PROGRAM

## 2020-06-25 PROCEDURE — G0180 PR HOME HEALTH MD CERTIFICATION: ICD-10-PCS | Mod: ,,, | Performed by: STUDENT IN AN ORGANIZED HEALTH CARE EDUCATION/TRAINING PROGRAM

## 2020-06-25 NOTE — TELEPHONE ENCOUNTER
----- Message from Rachael Santillan sent at 6/25/2020 11:53 AM CDT -----  Contact: pt  .Name of Caller   Reason for Visit/Symptoms hosp follow up  Best Contact Number or Confirm if [facility] [phone]  Preferred Date/Time of Appointment as soon as possible  Interested in Virtual Visit (yes/no) yes audio only  Additional Information pt only wants the Dr to call him

## 2020-06-25 NOTE — TELEPHONE ENCOUNTER
Patient is requesting appointment with Neurology as suggested him by Home Health, and provided by supporting care team (Dr Aguilera) while Dr Velázquez is out of office.

## 2020-06-25 NOTE — TELEPHONE ENCOUNTER
PCP is out of office, and patient was called to advise him of such.   States that he needs a neurology appointment for his legs (patient has sciatica and has history of paraplegia.     Advised that we will ask for help from the care team in PCP absence and work to schedule. Advised someone will reach out to him when we are able to schedule.       Order pended for review.

## 2020-06-29 NOTE — TELEPHONE ENCOUNTER
----- Message from Rhonda Frey sent at 6/29/2020  1:08 PM CDT -----  Regarding: Please reroute  Contact: Veronica  Type:  Patient Returning Call    Who Called:  Veronica  Who Left Message for Patient:  Karyn harmon  Does the patient know what this is regarding?:  Please send orders to [facility]  Best Call Back Number:    Additional Information:  Please follow

## 2020-06-29 NOTE — TELEPHONE ENCOUNTER
Call placed to patient who would like order for DME sent to [facility] locally.   Advised to check back with them later today to ensure that they handle insurance billing for DME equipment.   Voiced understanding.

## 2020-07-13 ENCOUNTER — TELEPHONE (OUTPATIENT)
Dept: FAMILY MEDICINE | Facility: CLINIC | Age: 70
End: 2020-07-13

## 2020-07-13 NOTE — TELEPHONE ENCOUNTER
----- Message from Teri Gusman sent at 7/13/2020 12:55 PM CDT -----  Contact: pt  Type:  RX Refill Request    Who Called:  Pt    Refill or New Rx:  refill  RX Name and Strength: oxyCODONE-acetaminophen (PERCOCET) 7.5-325 mg per tablet  How is the patient currently taking it? (ex. 1XDay):  As Directed  Is this a 30 day or 90 day RX:  as Directed  Preferred Pharmacy with phone number:    [facility] #[id number] - [place], [place] - [address] [place], [place] [address]  [address] [address]  Phone: [phone] Fax: [phone]  Best Call Back Number:  [phone]  Additional Information:  Please Advise ---Thank you

## 2020-07-13 NOTE — TELEPHONE ENCOUNTER
You can make a telemedicine visit or phone call visit if he does not have wait to do the telemedicine.  Thank you

## 2020-07-13 NOTE — TELEPHONE ENCOUNTER
I will not be able to refill pain medications without seen the patient., I did not prescribe the medicine before.  Thank you

## 2020-07-13 NOTE — TELEPHONE ENCOUNTER
"Spoke with pt, refused in office appt with PCP, refused to go back to Pain management, pt repeated that he is "ambulatory and also bed ridden" in the same conversations. Pt acknowledged that he rec'd rx for #30Percocet 5mg on6/23/20 but takes more than #6 per day and is out of them. Wants something for pain and believes that the Dr that performed his most recent surgery "dont like Black people" . Advised pt that I cannot comment on that part of the conversation but am willing to make in office appt , pt refused and requested that PCP "call me personally", advised pt that Dr Velázquez is very busy with clinic and would rather talk to him in person at his appt, pt still refused and requested phone call. Please advise  "

## 2020-07-15 RX ORDER — OXYCODONE AND ACETAMINOPHEN 7.5; 325 MG/1; MG/1
1 TABLET ORAL EVERY 4 HOURS PRN
Qty: 15 TABLET | Refills: 0 | OUTPATIENT
Start: 2020-07-15

## 2020-07-15 NOTE — TELEPHONE ENCOUNTER
I did not prescribe this medicine before for this patient, is a controlled substance scheduled 2, and actually I do not prescribe those kind of medications, the patient is to follow with the pain management and of course hospital follow-up with me.  Thank you

## 2020-07-15 NOTE — TELEPHONE ENCOUNTER
LOV 5/12/2020    This medication is requested for refill, originally appears to have been issued at hospital.   Has not had hospital follow up

## 2020-07-15 NOTE — TELEPHONE ENCOUNTER
----- Message from Summer Overton sent at 7/15/2020 11:38 AM CDT -----  Pt called about getting a refill for pain medication oxyCODONE-acetaminophen (PERCOCET) 7.5-325 mg per tablet pt stated that he's in a lot of pain with his back and leg please reach out to him at [phone]    [facility] [phone] Fax [phone]

## 2020-07-16 ENCOUNTER — OFFICE VISIT (OUTPATIENT)
Dept: FAMILY MEDICINE | Facility: CLINIC | Age: 70
End: 2020-07-16
Payer: MEDICARE

## 2020-07-16 DIAGNOSIS — R53.1 WEAKNESS: ICD-10-CM

## 2020-07-16 DIAGNOSIS — M54.42 CHRONIC BILATERAL LOW BACK PAIN WITH BILATERAL SCIATICA: Primary | ICD-10-CM

## 2020-07-16 DIAGNOSIS — G89.29 CHRONIC BILATERAL LOW BACK PAIN WITH BILATERAL SCIATICA: Primary | ICD-10-CM

## 2020-07-16 DIAGNOSIS — M54.41 CHRONIC BILATERAL LOW BACK PAIN WITH BILATERAL SCIATICA: Primary | ICD-10-CM

## 2020-07-16 DIAGNOSIS — D50.8 OTHER IRON DEFICIENCY ANEMIA: ICD-10-CM

## 2020-07-16 DIAGNOSIS — R60.0 LOCALIZED EDEMA: ICD-10-CM

## 2020-07-16 PROCEDURE — 99442 PR PHYSICIAN TELEPHONE EVALUATION 11-20 MIN: CPT | Mod: 95,,, | Performed by: FAMILY MEDICINE

## 2020-07-16 PROCEDURE — 99442 PR PHYSICIAN TELEPHONE EVALUATION 11-20 MIN: ICD-10-PCS | Mod: 95,,, | Performed by: FAMILY MEDICINE

## 2020-07-16 RX ORDER — HYDROCODONE BITARTRATE AND ACETAMINOPHEN 5; 325 MG/1; MG/1
1 TABLET ORAL EVERY 8 HOURS PRN
Qty: 21 TABLET | Refills: 0 | Status: SHIPPED | OUTPATIENT
Start: 2020-07-16 | End: 2020-11-25 | Stop reason: SDUPTHER

## 2020-07-27 ENCOUNTER — TELEPHONE (OUTPATIENT)
Dept: FAMILY MEDICINE | Facility: CLINIC | Age: 70
End: 2020-07-27

## 2020-07-27 NOTE — TELEPHONE ENCOUNTER
----- Message from Rohnda Frey sent at 7/27/2020  8:14 AM CDT -----  Regarding: Pt advice  Contact: Pt  Type:  Patient Returning Call    Who Called:  pt  Does the patient know what this is regarding?:  pt would like office to reach out. Stated legs are swollen and in pain.  Best Call Back Number:    Additional Information:  Please follow up as soon as possible

## 2020-07-27 NOTE — TELEPHONE ENCOUNTER
Call placed to patient phone, spoke to patient nephew who reports he is sleeping. Offered appointment with patient for today, but he reports patient does not have transportation.     Will have patient call back when he gets up from nap.

## 2020-07-28 ENCOUNTER — TELEPHONE (OUTPATIENT)
Dept: FAMILY MEDICINE | Facility: CLINIC | Age: 70
End: 2020-07-28

## 2020-07-28 NOTE — TELEPHONE ENCOUNTER
----- Message from Mila Vergara sent at 7/27/2020  1:28 PM CDT -----  Contact: self  Type:  Patient Returning Call    Who Called:  patient   Who Left Message for Patient:  Karyn Bess  Does the patient know what this is regarding?:  yes  Best Call Back Number:  [phone] home  Additional Information:

## 2020-07-30 ENCOUNTER — TELEPHONE (OUTPATIENT)
Dept: FAMILY MEDICINE | Facility: CLINIC | Age: 70
End: 2020-07-30

## 2020-07-30 NOTE — TELEPHONE ENCOUNTER
----- Message from Alfonso Rodas sent at 7/30/2020 10:19 AM CDT -----  Regarding: Rx refill  Type: Needs Medical Advice    Who Called:  Ptnt [phone]        Pharmacy name and phone #:      [id number] - [place], [place] - [address] [facility] [address]  [address]  [facility] [address]  Phone: [phone] Fax: [phone]    Best Call Back Number: [phone]    Additional Information:     Advised needs to speak with the nurse about a refill on medication he was prescribed after recent back surgery.  The medication is BACLOFEN (not on chart) prescribed by by Dr You.     Please call.

## 2020-07-30 NOTE — TELEPHONE ENCOUNTER
Okay.  When he he calls back, to let me know how many mg was prescribed.  So I can fax a prescription to the pharmacy.  Thank you

## 2020-07-31 RX ORDER — BACLOFEN 10 MG/1
10 TABLET ORAL 3 TIMES DAILY PRN
Qty: 30 TABLET | Refills: 2 | Status: SHIPPED | OUTPATIENT
Start: 2020-07-31 | End: 2020-08-26 | Stop reason: SDUPTHER

## 2020-07-31 NOTE — TELEPHONE ENCOUNTER
Spoke with patient, he states it is baclofen 10 mg that he has been taking. Medication pended. Please advise

## 2020-07-31 NOTE — TELEPHONE ENCOUNTER
----- Message from Marky Jose sent at 12/30/2019  9:26 AM CST -----  Contact: pt  Type: Needs Medical Advice    Who Called:  pt    Best Call Back Number: [phone]  Additional Information: Pt would like to discuss the medications escitalopram oxalate (LEXAPRO) 10 MG tablet and HYDROcodone-acetaminophen (NORCO) 5-325 mg per tablet. Please call to advise.      no

## 2020-08-10 ENCOUNTER — TELEPHONE (OUTPATIENT)
Dept: FAMILY MEDICINE | Facility: CLINIC | Age: 70
End: 2020-08-10

## 2020-08-10 NOTE — TELEPHONE ENCOUNTER
----- Message from Marily Grey sent at 8/10/2020  8:27 AM CDT -----  Type: Needs Medical Advice  Who Called:  Patient  Best Call Back Number: [phone] (home)   Additional Information: the patient is asking for a nurse to call him back in regards to physical therapy he said he desperately needs help please call to advise --the patient said he is in pain as well. Thanks.

## 2020-08-11 NOTE — TELEPHONE ENCOUNTER
I gave a prescription to Zayra to fax it to [facility].  Thank you

## 2020-08-11 NOTE — TELEPHONE ENCOUNTER
Patient stating he is having some pain with the lower legs still. Spoke to pain management and they were no help to him regarding issues with his pain. Stating they will not give him pain medication as well. Would like more physical therapy ordered or legs, they only covered 5 visits last therapy session. Please advise

## 2020-08-11 NOTE — TELEPHONE ENCOUNTER
Sure.  I will place an order can you please let me know was physical therapy he uses so I can fax the prescription for therapy.  Thank you

## 2020-08-12 ENCOUNTER — TELEPHONE (OUTPATIENT)
Dept: FAMILY MEDICINE | Facility: CLINIC | Age: 70
End: 2020-08-12

## 2020-08-12 NOTE — TELEPHONE ENCOUNTER
----- Message from Rah Myers sent at 8/12/2020 12:11 PM CDT -----  Contact: Indu  Type: Needs Medical Advice  Who Called:  Indu with [facility]  Symptoms (please be specific):    How long has patient had these symptoms:    Pharmacy name and phone #:    Best Call Back Number:   Additional Information: requesting a call back regarding referral that was sent

## 2020-08-13 ENCOUNTER — TELEPHONE (OUTPATIENT)
Dept: FAMILY MEDICINE | Facility: CLINIC | Age: 70
End: 2020-08-13

## 2020-08-13 DIAGNOSIS — R29.898 WEAKNESS OF BOTH LEGS: Primary | ICD-10-CM

## 2020-08-13 NOTE — TELEPHONE ENCOUNTER
Indu from [facility] is requesting outpatient PT/OT. He was discharged from  because he met all his needs. She is stating he would benefit from PT/ OT in an outpatient setting. Patient lives in [place] and would need a facility close to him. Referral is pended please advise

## 2020-08-26 ENCOUNTER — OFFICE VISIT (OUTPATIENT)
Dept: FAMILY MEDICINE | Facility: CLINIC | Age: 70
End: 2020-08-26
Payer: MEDICARE

## 2020-08-26 DIAGNOSIS — E61.1 IRON DEFICIENCY: ICD-10-CM

## 2020-08-26 DIAGNOSIS — R53.1 WEAKNESS: Primary | ICD-10-CM

## 2020-08-26 DIAGNOSIS — E16.2 HYPOGLYCEMIA: ICD-10-CM

## 2020-08-26 DIAGNOSIS — E77.8 HYPOPROTEINEMIA: ICD-10-CM

## 2020-08-26 DIAGNOSIS — Z98.1 S/P LUMBAR FUSION: ICD-10-CM

## 2020-08-26 PROCEDURE — 99442 PR PHYSICIAN TELEPHONE EVALUATION 11-20 MIN: ICD-10-PCS | Mod: 95,,, | Performed by: FAMILY MEDICINE

## 2020-08-26 PROCEDURE — 99442 PR PHYSICIAN TELEPHONE EVALUATION 11-20 MIN: CPT | Mod: 95,,, | Performed by: FAMILY MEDICINE

## 2020-08-26 RX ORDER — BACLOFEN 10 MG/1
10 TABLET ORAL 3 TIMES DAILY PRN
Qty: 90 TABLET | Refills: 2 | Status: SHIPPED | OUTPATIENT
Start: 2020-08-26 | End: 2020-10-06 | Stop reason: ALTCHOICE

## 2020-08-26 NOTE — PROGRESS NOTES
Established Patient - Audio Only Telehealth Visit     The patient location is:  [place]  The chief complaint leading to consultation is:  Weakness  Visit type: Virtual visit with audio only (telephone)  Total time spent with patient:  20 min       The reason for the audio only service rather than synchronous audio and video virtual visit was related to technical difficulties or patient preference/necessity.     Each patient to whom I provide medical services by telemedicine is:  (1) informed of the relationship between the physician and patient and the respective role of any other health care provider with respect to management of the patient; and (2) notified that they may decline to receive medical services by telemedicine and may withdraw from such care at any time. Patient verbally consented to receive this service via voice-only telephone call.       HPI:  The patient is complaining of pain on the back and bilateral lower extremities weakness.  The patient has been doing physical therapy twice already with improvement of the symptoms.  The patient did not follow-up with the neurosurgeon because he was unable to have transportation a he did not show for the appointment.  He now is being helped by his nephew and has transportation would like to follow-up with neurosurgeon.  The patient denies any symptoms of fever, chills, worsening shortness of breath or chest pain.  The patient also states that the muscle relaxants baclofen is working and would like to have another prescription as he is taking the medication 3 times daily and he ran out the medication.  Upon review of the last blood work, the patient has anemia and also hypoglycemia and hypoproteinemia.     Past medical history, past social history was reviewed and discussed with the patient.    Assessment and plan:       Weakness:  Improved  -     CBC auto differential; Future; Expected date: 08/26/2020  -     Comprehensive metabolic panel; Future; Expected  date: 08/26/2020  -     Hemoglobin A1C; Future; Expected date: 08/26/2020    Hypoglycemia:  New problem workup needed  -     Hemoglobin A1C; Future; Expected date: 08/26/2020  -     INSULIN, RANDOM; Future; Expected date: 08/26/2020    S/P lumbar fusion:  Stable  -     Ambulatory referral/consult to Neurosurgery; Future; Expected date: 09/02/2020  -     baclofen (LIORESAL) 10 MG tablet; Take 1 tablet (10 mg total) by mouth 3 (three) times daily as needed.  Dispense: 90 tablet; Refill: 2  Iron deficiency:  Worsening  -     CBC auto differential; Future; Expected date: 08/26/2020  -     Iron and TIBC; Future; Expected date: 08/26/2020  -     Ferritin; Future; Expected date: 08/26/2020    Hypoproteinemia:  New problem workup needed      Will refer the patient back to the neurosurgeon for a follow-up appointment after surgery.  As the patient missed his appointment.  Will continue with baclofen.  Will follow-up with the patient in the clinic in 2-4 weeks.                            This service was not originating from a related E/M service provided within the previous 7 days nor will  to an E/M service or procedure within the next 24 hours or my soonest available appointment.  Prevailing standard of care was able to be met in this audio-only visit.

## 2020-09-01 ENCOUNTER — TELEPHONE (OUTPATIENT)
Dept: NEUROSURGERY | Facility: CLINIC | Age: 70
End: 2020-09-01

## 2020-09-01 NOTE — TELEPHONE ENCOUNTER
Patient requesting pain medication.  Explained that surgery was done in May and he will need to go to PCP to get pain medications.

## 2020-09-09 ENCOUNTER — TELEPHONE (OUTPATIENT)
Dept: FAMILY MEDICINE | Facility: CLINIC | Age: 70
End: 2020-09-09

## 2020-09-09 NOTE — TELEPHONE ENCOUNTER
----- Message from Susana Bella sent at 9/9/2020  9:20 AM CDT -----  Contact: pt at [phone]  Type: Needs Medical Advice  Who Called:  pt  Symptoms (please be specific):  pain in leg  Best Call Back Number: [phone]  Additional Information: Patient would like a call from a nurse.Please call back and advise.

## 2020-09-10 ENCOUNTER — TELEPHONE (OUTPATIENT)
Dept: FAMILY MEDICINE | Facility: CLINIC | Age: 70
End: 2020-09-10

## 2020-09-10 NOTE — TELEPHONE ENCOUNTER
----- Message from Daniela Hutchison sent at 9/10/2020 10:03 AM CDT -----  Contact: self  Patient fell down and both legs are swelling and needs someone to call him back at [phone] (home).  Thanks

## 2020-09-11 ENCOUNTER — TELEPHONE (OUTPATIENT)
Dept: ADMINISTRATIVE | Facility: OTHER | Age: 70
End: 2020-09-11

## 2020-09-11 ENCOUNTER — TELEPHONE (OUTPATIENT)
Dept: NEUROSURGERY | Facility: CLINIC | Age: 70
End: 2020-09-11

## 2020-09-11 NOTE — TELEPHONE ENCOUNTER
Called patient to reschedule appointment as Dr. Velázquez's template changed. Unable to leave voicemail, no answer from patient.

## 2020-09-23 ENCOUNTER — LAB VISIT (OUTPATIENT)
Dept: LAB | Facility: HOSPITAL | Age: 70
End: 2020-09-23
Attending: FAMILY MEDICINE
Payer: MEDICARE

## 2020-09-23 DIAGNOSIS — E16.2 HYPOGLYCEMIA: ICD-10-CM

## 2020-09-23 DIAGNOSIS — E61.1 IRON DEFICIENCY: ICD-10-CM

## 2020-09-23 DIAGNOSIS — R53.1 WEAKNESS: ICD-10-CM

## 2020-09-23 LAB
ALBUMIN SERPL BCP-MCNC: 3.7 G/DL (ref 3.5–5.2)
ALP SERPL-CCNC: 80 U/L (ref 55–135)
ALT SERPL W/O P-5'-P-CCNC: 28 U/L (ref 10–44)
ANION GAP SERPL CALC-SCNC: 11 MMOL/L (ref 8–16)
AST SERPL-CCNC: 22 U/L (ref 10–40)
BASOPHILS # BLD AUTO: 0.05 K/UL (ref 0–0.2)
BASOPHILS NFR BLD: 0.6 % (ref 0–1.9)
BILIRUB SERPL-MCNC: 0.9 MG/DL (ref 0.1–1)
BUN SERPL-MCNC: 9 MG/DL (ref 8–23)
CALCIUM SERPL-MCNC: 8.8 MG/DL (ref 8.7–10.5)
CHLORIDE SERPL-SCNC: 105 MMOL/L (ref 95–110)
CO2 SERPL-SCNC: 25 MMOL/L (ref 23–29)
CREAT SERPL-MCNC: 0.8 MG/DL (ref 0.5–1.4)
DIFFERENTIAL METHOD: ABNORMAL
EOSINOPHIL # BLD AUTO: 0.1 K/UL (ref 0–0.5)
EOSINOPHIL NFR BLD: 1.6 % (ref 0–8)
ERYTHROCYTE [DISTWIDTH] IN BLOOD BY AUTOMATED COUNT: 21.7 % (ref 11.5–14.5)
EST. GFR  (AFRICAN AMERICAN): >60 ML/MIN/1.73 M^2
EST. GFR  (NON AFRICAN AMERICAN): >60 ML/MIN/1.73 M^2
FERRITIN SERPL-MCNC: 93 NG/ML (ref 20–300)
GLUCOSE SERPL-MCNC: 83 MG/DL (ref 70–110)
HCT VFR BLD AUTO: 46 % (ref 40–54)
HGB BLD-MCNC: 14.3 G/DL (ref 14–18)
IMM GRANULOCYTES # BLD AUTO: 0.03 K/UL (ref 0–0.04)
IMM GRANULOCYTES NFR BLD AUTO: 0.3 % (ref 0–0.5)
INSULIN COLLECTION INTERVAL: NORMAL
INSULIN SERPL-ACNC: 9.8 UU/ML
IRON SERPL-MCNC: 94 UG/DL (ref 45–160)
LYMPHOCYTES # BLD AUTO: 1.9 K/UL (ref 1–4.8)
LYMPHOCYTES NFR BLD: 21.7 % (ref 18–48)
MCH RBC QN AUTO: 27.2 PG (ref 27–31)
MCHC RBC AUTO-ENTMCNC: 31.1 G/DL (ref 32–36)
MCV RBC AUTO: 88 FL (ref 82–98)
MONOCYTES # BLD AUTO: 1 K/UL (ref 0.3–1)
MONOCYTES NFR BLD: 11.2 % (ref 4–15)
NEUTROPHILS # BLD AUTO: 5.8 K/UL (ref 1.8–7.7)
NEUTROPHILS NFR BLD: 64.6 % (ref 38–73)
NRBC BLD-RTO: 0 /100 WBC
PLATELET # BLD AUTO: 225 K/UL (ref 150–350)
PMV BLD AUTO: 12.9 FL (ref 9.2–12.9)
POTASSIUM SERPL-SCNC: 3.5 MMOL/L (ref 3.5–5.1)
PROT SERPL-MCNC: 7.6 G/DL (ref 6–8.4)
RBC # BLD AUTO: 5.25 M/UL (ref 4.6–6.2)
SATURATED IRON: 26 % (ref 20–50)
SODIUM SERPL-SCNC: 141 MMOL/L (ref 136–145)
TOTAL IRON BINDING CAPACITY: 367 UG/DL (ref 250–450)
TRANSFERRIN SERPL-MCNC: 248 MG/DL (ref 200–375)
WBC # BLD AUTO: 8.95 K/UL (ref 3.9–12.7)

## 2020-09-23 PROCEDURE — 85025 COMPLETE CBC W/AUTO DIFF WBC: CPT

## 2020-09-23 PROCEDURE — 82728 ASSAY OF FERRITIN: CPT

## 2020-09-23 PROCEDURE — 83525 ASSAY OF INSULIN: CPT

## 2020-09-23 PROCEDURE — 83540 ASSAY OF IRON: CPT

## 2020-09-23 PROCEDURE — 83036 HEMOGLOBIN GLYCOSYLATED A1C: CPT

## 2020-09-23 PROCEDURE — 36415 COLL VENOUS BLD VENIPUNCTURE: CPT | Mod: PO

## 2020-09-23 PROCEDURE — 80053 COMPREHEN METABOLIC PANEL: CPT

## 2020-09-24 ENCOUNTER — TELEPHONE (OUTPATIENT)
Dept: FAMILY MEDICINE | Facility: CLINIC | Age: 70
End: 2020-09-24

## 2020-09-24 DIAGNOSIS — R53.1 WEAKNESS: Primary | ICD-10-CM

## 2020-09-24 LAB
ESTIMATED AVG GLUCOSE: 80 MG/DL (ref 68–131)
HBA1C MFR BLD HPLC: 4.4 % (ref 4–5.6)

## 2020-09-24 NOTE — TELEPHONE ENCOUNTER
----- Message from Abril Bowers sent at 9/24/2020 12:17 PM CDT -----  Regarding: Approval for Phys Therapy  Contact: patient  Type: Needs Medical Advice    Who Called:  patient    Best Call Back Number: [phone]     Additional Information: patient is requesting approval for additional phys therapy sessions.

## 2020-09-25 NOTE — TELEPHONE ENCOUNTER
----- Message from Princess MARINA Koehler sent at 9/25/2020 10:43 AM CDT -----  Contact: pt  Type:  Patient Returning Call    Who Called:  patient   Who Left Message for Patient:  Nurse Wren  Does the patient know what this is regarding?:  yes  Best Call Back Number:  [phone]  Additional Information:

## 2020-09-29 ENCOUNTER — TELEPHONE (OUTPATIENT)
Dept: FAMILY MEDICINE | Facility: CLINIC | Age: 70
End: 2020-09-29

## 2020-09-29 ENCOUNTER — PATIENT OUTREACH (OUTPATIENT)
Dept: ADMINISTRATIVE | Facility: OTHER | Age: 70
End: 2020-09-29

## 2020-09-29 NOTE — TELEPHONE ENCOUNTER
----- Message from Gerri Bowen sent at 9/28/2020 12:33 PM CDT -----  Contact: call  pt [phone]//[phone]    Type:  Same Day Appointment Request    Caller is requesting a same day appointment.  Caller declined first available appointment listed below.      Name of Caller: pt  When is the first available appointment?  #  pt   asking  for a   audio   visit  today   Symptoms   med  ck  Best Call Back Number:  call  pt [phone]//[phone]  Additional Information:     please rene  for details

## 2020-09-29 NOTE — PROGRESS NOTES
Health Maintenance Due   Topic Date Due    Shingles Vaccine (1 of 2) 04/18/1999    Influenza Vaccine (1) 08/01/2020     Updates were requested from care everywhere.  Chart was reviewed for overdue Proactive [facility] Encounters ([facility]) topics (CRS, Breast Cancer Screening, Eye exam)  Health Maintenance has been updated.  LINKS immunization registry triggered.  Immunizations were reconciled.

## 2020-09-30 ENCOUNTER — TELEPHONE (OUTPATIENT)
Dept: FAMILY MEDICINE | Facility: CLINIC | Age: 70
End: 2020-09-30

## 2020-09-30 NOTE — TELEPHONE ENCOUNTER
----- Message from Angi Garnett sent at 9/30/2020  2:38 PM CDT -----  Type: Needs Medical Advice  Who Called:  Alexis  Symptoms (please be specific):  Pain in legs  How long has patient had these symptoms:  since May  Pharmacy name and phone #:    [id number] - [place], [place] - [address] AT [facility] & [address]  [address]  [place] [address]  Phone: [phone] Fax: [phone]    Best Call Back Number: [phone]  Additional Information: thank you!

## 2020-09-30 NOTE — TELEPHONE ENCOUNTER
Spoke to pt. Pt states his legs and back are sore from PT and would like to know what he can take. I attempted to schedule an appt, but pt refused. Please advise.

## 2020-10-05 ENCOUNTER — TELEPHONE (OUTPATIENT)
Dept: FAMILY MEDICINE | Facility: CLINIC | Age: 70
End: 2020-10-05

## 2020-10-05 NOTE — TELEPHONE ENCOUNTER
----- Message from Princess MARINA Koehler sent at 10/2/2020  2:34 PM CDT -----  Contact: pt  Type: Needs Medical Advice  Who Called: pt   Pharmacy name and phone #:    [id number] - [place] - [address] AT [facility] & [address]  [address]  [place] [address]  Phone: [phone] Fax: [phone]  Best Call Back Number: [phone]    Additional Information:  requesting a call to get a update on if the provider is going to send something in for his Leg Pains. Please advise.

## 2020-10-13 DIAGNOSIS — R60.0 LOCALIZED EDEMA: ICD-10-CM

## 2020-10-13 RX ORDER — POTASSIUM CHLORIDE 20 MEQ/1
TABLET, EXTENDED RELEASE ORAL
Qty: 90 TABLET | Refills: 3 | Status: SHIPPED | OUTPATIENT
Start: 2020-10-13 | End: 2021-09-29 | Stop reason: SDUPTHER

## 2020-10-13 NOTE — TELEPHONE ENCOUNTER
No new care gaps identified.  Powered by [employer]. Reference number: [id number]. 10/13/2020 6:10:17 AM   KIKET

## 2020-10-13 NOTE — PROGRESS NOTES
Refill Authorization Note   Alexis Medrano is requesting a refill authorization.  Brief assessment and rationale for refill: Approve     Medication Therapy Plan: CDMR.     Medication reconciliation completed: No   Comments:   Orders Placed This Encounter    potassium chloride SA (K-DUR,KLOR-CON) 20 MEQ tablet      Requested Prescriptions   Signed Prescriptions Disp Refills    potassium chloride SA (K-DUR,KLOR-CON) 20 MEQ tablet 90 tablet 3     Sig: TAKE 1 TABLET(20 MEQ) BY MOUTH EVERY DAY       Endocrinology:  Minerals - Potassium Supplementation Passed - 10/13/2020  6:09 AM        Passed - Patient is at least 18 years old        Passed - Office Visit within last 12 months or future 90 days.     Recent Outpatient Visits            1 month ago Weakness    [facility] Kalani Velázquez MD    2 months ago Chronic bilateral low back pain with bilateral sciatica    [facility] Kalani Velázquez MD    4 months ago Chronic obstructive pulmonary disease, unspecified COPD type    [facility] Radha Horvath MD    5 months ago Sore throat    [facility] Kalani Velázquez MD    6 months ago Cervical radiculopathy    [facility] Kalani Velázquez MD          Future Appointments              In 1 week Kalani Velázquez MD [facility]                Passed - K in normal range and within 180 days     POC Potassium   Date Value Ref Range Status   05/29/2020 4.2 3.5 - 5.1 mmol/L Final   05/29/2020 4.2 3.5 - 5.1 mmol/L Final     Potassium   Date Value Ref Range Status   09/23/2020 3.5 3.5 - 5.1 mmol/L Final   06/01/2020 3.8 3.5 - 5.1 mmol/L Final   05/31/2020 3.9 3.5 - 5.1 mmol/L Final              Passed - Cr is 1.3 or below and within 180 days     Creatinine   Date Value Ref Range Status   09/23/2020 0.8 0.5 - 1.4 mg/dL Final   06/01/2020 0.67 0.50 - 1.40 mg/dL Final   05/31/2020 0.65 0.50 - 1.40 mg/dL Final               Passed - eGFR within 180 days     eGFR if non    Date Value Ref Range Status   09/23/2020 >60.0 >60 mL/min/1.73 m^2 Final     Comment:     Calculation used to obtain the estimated glomerular filtration  rate (eGFR) is the CKD-EPI equation.      06/01/2020 >60 >60 mL/min/1.73 m^2 Final     Comment:     Calculation used to obtain the estimated glomerular filtration  rate (eGFR) is the CKD-EPI equation.      05/31/2020 >60 >60 mL/min/1.73 m^2 Final     Comment:     Calculation used to obtain the estimated glomerular filtration  rate (eGFR) is the CKD-EPI equation.        eGFR if    Date Value Ref Range Status   09/23/2020 >60.0 >60 mL/min/1.73 m^2 Final   06/01/2020 >60 >60 mL/min/1.73 m^2 Final   05/31/2020 >60 >60 mL/min/1.73 m^2 Final                  Appointments  past 12m or future 3m with PCP    Date Provider   Last Visit   8/26/2020 Kalani Velázquez MD   Next Visit   10/23/2020 Kalani Velázquez MD   ED visits in past 90 days: 0     Note composed:2:16 PM 10/13/2020

## 2020-10-23 ENCOUNTER — OFFICE VISIT (OUTPATIENT)
Dept: FAMILY MEDICINE | Facility: CLINIC | Age: 70
End: 2020-10-23
Payer: MEDICARE

## 2020-10-23 VITALS
HEART RATE: 76 BPM | OXYGEN SATURATION: 95 % | TEMPERATURE: 99 F | SYSTOLIC BLOOD PRESSURE: 144 MMHG | WEIGHT: 185.19 LBS | BODY MASS INDEX: 31.79 KG/M2 | DIASTOLIC BLOOD PRESSURE: 84 MMHG

## 2020-10-23 DIAGNOSIS — R60.0 LOCALIZED EDEMA: ICD-10-CM

## 2020-10-23 DIAGNOSIS — F41.9 ANXIETY: ICD-10-CM

## 2020-10-23 DIAGNOSIS — I10 ESSENTIAL HYPERTENSION: ICD-10-CM

## 2020-10-23 DIAGNOSIS — Z13.6 SCREENING FOR AAA (ABDOMINAL AORTIC ANEURYSM): ICD-10-CM

## 2020-10-23 DIAGNOSIS — J44.9 CHRONIC OBSTRUCTIVE PULMONARY DISEASE, UNSPECIFIED COPD TYPE: ICD-10-CM

## 2020-10-23 DIAGNOSIS — Z98.890 S/P LUMBAR SPINE OPERATION: Primary | ICD-10-CM

## 2020-10-23 PROCEDURE — 1159F PR MEDICATION LIST DOCUMENTED IN MEDICAL RECORD: ICD-10-PCS | Mod: S$GLB,,, | Performed by: FAMILY MEDICINE

## 2020-10-23 PROCEDURE — 3079F PR MOST RECENT DIASTOLIC BLOOD PRESSURE 80-89 MM HG: ICD-10-PCS | Mod: CPTII,S$GLB,, | Performed by: FAMILY MEDICINE

## 2020-10-23 PROCEDURE — 3077F SYST BP >= 140 MM HG: CPT | Mod: CPTII,S$GLB,, | Performed by: FAMILY MEDICINE

## 2020-10-23 PROCEDURE — 99999 PR PBB SHADOW E&M-EST. PATIENT-LVL V: CPT | Mod: PBBFAC,,, | Performed by: FAMILY MEDICINE

## 2020-10-23 PROCEDURE — 1125F AMNT PAIN NOTED PAIN PRSNT: CPT | Mod: S$GLB,,, | Performed by: FAMILY MEDICINE

## 2020-10-23 PROCEDURE — 99999 PR PBB SHADOW E&M-EST. PATIENT-LVL V: ICD-10-PCS | Mod: PBBFAC,,, | Performed by: FAMILY MEDICINE

## 2020-10-23 PROCEDURE — 1159F MED LIST DOCD IN RCRD: CPT | Mod: S$GLB,,, | Performed by: FAMILY MEDICINE

## 2020-10-23 PROCEDURE — 3008F BODY MASS INDEX DOCD: CPT | Mod: CPTII,S$GLB,, | Performed by: FAMILY MEDICINE

## 2020-10-23 PROCEDURE — 3008F PR BODY MASS INDEX (BMI) DOCUMENTED: ICD-10-PCS | Mod: CPTII,S$GLB,, | Performed by: FAMILY MEDICINE

## 2020-10-23 PROCEDURE — 1101F PR PT FALLS ASSESS DOC 0-1 FALLS W/OUT INJ PAST YR: ICD-10-PCS | Mod: CPTII,S$GLB,, | Performed by: FAMILY MEDICINE

## 2020-10-23 PROCEDURE — 1125F PR PAIN SEVERITY QUANTIFIED, PAIN PRESENT: ICD-10-PCS | Mod: S$GLB,,, | Performed by: FAMILY MEDICINE

## 2020-10-23 PROCEDURE — 1101F PT FALLS ASSESS-DOCD LE1/YR: CPT | Mod: CPTII,S$GLB,, | Performed by: FAMILY MEDICINE

## 2020-10-23 PROCEDURE — 99214 OFFICE O/P EST MOD 30 MIN: CPT | Mod: S$GLB,,, | Performed by: FAMILY MEDICINE

## 2020-10-23 PROCEDURE — 99214 PR OFFICE/OUTPT VISIT, EST, LEVL IV, 30-39 MIN: ICD-10-PCS | Mod: S$GLB,,, | Performed by: FAMILY MEDICINE

## 2020-10-23 PROCEDURE — 3079F DIAST BP 80-89 MM HG: CPT | Mod: CPTII,S$GLB,, | Performed by: FAMILY MEDICINE

## 2020-10-23 PROCEDURE — 3077F PR MOST RECENT SYSTOLIC BLOOD PRESSURE >= 140 MM HG: ICD-10-PCS | Mod: CPTII,S$GLB,, | Performed by: FAMILY MEDICINE

## 2020-10-23 RX ORDER — ALBUTEROL SULFATE 90 UG/1
AEROSOL, METERED RESPIRATORY (INHALATION)
Qty: 54 G | OUTPATIENT
Start: 2020-10-23

## 2020-10-23 RX ORDER — DIAZEPAM 5 MG/1
5 TABLET ORAL NIGHTLY PRN
Qty: 30 TABLET | Refills: 2 | Status: SHIPPED | OUTPATIENT
Start: 2020-10-23 | End: 2021-02-26 | Stop reason: SDUPTHER

## 2020-10-23 RX ORDER — FUROSEMIDE 20 MG/1
20 TABLET ORAL DAILY
Qty: 30 TABLET | Refills: 5 | Status: SHIPPED | OUTPATIENT
Start: 2020-10-23 | End: 2021-09-29 | Stop reason: SDUPTHER

## 2020-10-23 RX ORDER — ALBUTEROL SULFATE 90 UG/1
2 AEROSOL, METERED RESPIRATORY (INHALATION) EVERY 6 HOURS PRN
Qty: 18 G | Refills: 0 | Status: SHIPPED | OUTPATIENT
Start: 2020-10-23 | End: 2021-09-29 | Stop reason: SDUPTHER

## 2020-10-23 RX ORDER — ATENOLOL 100 MG/1
100 TABLET ORAL DAILY
Qty: 30 TABLET | Refills: 11 | Status: SHIPPED | OUTPATIENT
Start: 2020-10-23 | End: 2021-09-10 | Stop reason: SDUPTHER

## 2020-10-23 NOTE — TELEPHONE ENCOUNTER
No new care gaps identified.  Powered by [employer]. Reference number: [id number]. 10/23/2020 11:19:53 AM   DARLINE

## 2020-10-23 NOTE — PATIENT INSTRUCTIONS
Eating Heart-Healthy Food: Using the DASH Plan    Eating for your heart doesnt have to be hard or boring. You just need to know how to make healthier choices. The DASH eating plan has been developed to help you do just that. DASH stands for Dietary Approaches to Stop Hypertension. It is a plan that has been proven to be healthier for your heart and to lower your risk for high blood pressure. It can also help lower your risk for cancer, heart disease, osteoporosis, and diabetes.  Choosing from each food group  Choose foods from each of the food groups below each day. Try to get the recommended number of servings for each food group. The serving numbers are based on a diet of 2,000 calories a day. Talk to your doctor if youre unsure about your calorie needs. Along with getting the correct servings, the DASH plan also recommends a sodium intake less than 2,300 mg per day.        Grains  Servings: 6 to 8 a day  A serving is:  · 1 slice bread  · 1 ounce dry cereal  · Half a cup cooked rice, pasta or cereal  Best choices: Whole grains and any grains high in fiber. Vegetables  Servings: 4 to 5 a day  A serving is:  · 1 cup raw leafy vegetable  · Half a cup cut-up raw or cooked vegetable  · Half a cup vegetable juice  Best choices: Fresh or frozen vegetables prepared without added salt or fat.   Fruits  Servings: 4 to 5 a day  A serving is:  · 1 medium fruit  · One-quarter cup dried fruit  · Half a cup fresh, frozen, or canned fruit  · Half a cup of 100% fruit juices  Best choices: A variety of fresh fruits of different colors. Whole fruits are a better choice than fruit juices. Low-fat or fat-free dairy  Servings: 2 to 3 a day  A serving is:  · 1 cup milk  · 1 cup yogurt  · One and a half ounces cheese  Best choices: Skim or 1% milk, low-fat or fat-free yogurt or buttermilk, and low-fat cheeses.         Lean meats, poultry, fish  Servings: 6 or fewer a day  A serving is:  · 1 ounce cooked meats, poultry, or fish  · 1  egg  Best choices: Lean poultry and fish. Trim away visible fat. Broil, grill, roast, or boil instead of frying. Remove skin from poultry before eating. Limit how much red meat you eat.  Nuts, seeds, beans  Servings: 4 to 5 a week  A serving is:  · One-third cup nuts (one and a half ounces)  · 2 tablespoons nut butter or seeds  · Half a cup cooked dry beans or legumes  Best choices: Dry roasted nuts with no salt added, lentils, kidney beans, garbanzo beans, and whole angela beans.   Fats and oils  Servings: 2 to 3 a day  A serving is:  · 1 teaspoon vegetable oil  · 1 teaspoon soft margarine  · 1 tablespoon mayonnaise  · 2 tablespoons salad dressing  Best choices: Nut and vegetable oils (nontropical vegetable oils), such as olive and canola oil. Sweets  Servings: 5 a week or fewer  A serving is:  · 1 tablespoon sugar, maple syrup, or honey  · 1 tablespoon jam or jelly  · 1 half-ounce jelly beans (about 15)  · 1 cup lemonade  Best choices: Dried fruit can be a satisfying sweet. Choose low-fat sweets. And watch your serving sizes!      For more on the DASH eating plan, visit:  www.nhlbi.nih.gov/health/health-topics/topics/dash   Date Last Reviewed: 6/1/2016  © 8413-4803 [employer]. [address] [place] [address], [place] [address]. All rights reserved. This information is not intended as a substitute for professional medical care. Always follow your healthcare professional's instructions.

## 2020-10-23 NOTE — PROGRESS NOTES
Faustino FRANCIS. HANDLED IN A PREVIOUS ENCOUNTER   Refill Authorization Note   Alexis Medrano is requesting a refill authorization.    Brief assessment and rationale for refill: Quick Discontinue          Medication reconciliation completed: No    Comments:   Pended Medication(s)       Requested Prescriptions     Pending Prescriptions Disp Refills    albuterol (PROVENTIL/VENTOLIN HFA) 90 mcg/actuation inhaler [Pharmacy Med Name: ALBUTEROL HFA INH(200 PUFFS)18GM] 54 g      Sig: INHALE 2 PUFFS INTO THE LUNGS EVERY 6 HOURS AS NEEDED FOR WHEEZING        Duplicate Pended Encounter(s)/ Last Prescribed Details:    Ordering Encounter Report    Associated Reports   View Encounter                Note composed:1:36 PM 10/23/2020

## 2020-10-27 ENCOUNTER — TELEPHONE (OUTPATIENT)
Dept: NEUROSURGERY | Facility: CLINIC | Age: 70
End: 2020-10-27

## 2020-10-27 NOTE — TELEPHONE ENCOUNTER
----- Message from Marcelle Maurer sent at 10/27/2020  9:24 AM CDT -----  Regarding: appointment  Contact: patient  Type:  Sooner Apoointment Request    Caller is requesting a sooner appointment.  Caller declined first available appointment listed below.  Caller will not accept being placed on the waitlist and is requesting a message be sent to doctor.    Name of Caller:  patient  When is the first available appointment?    Symptoms:  follow up from surgery in May  Best Call Back Number:  [phone] (work)  Additional Information:  Patient states he needs to follow up as soon as possible. Please call patient. Thanks!

## 2020-10-30 NOTE — PROGRESS NOTES
Subjective:       Patient ID: Alexis Medrano Jr. is a 70 y.o. male.    Chief Complaint: Follow-up    HPI     Hypertension:  The patient has been taking his blood pressure medications as directed, denies any side effects of the medication.  The patient is coming here for refills.  He not bring a log of the blood pressure readings from home.    Anxiety:  The patient stated that he is taking sometimes for severe anxiety symptoms diazepam, he ran out the prescription he will need a new prescription.    Edema:  Complains of swelling lower extremity, he is asking for prescription for Lasix.    SP fusion:  The patient is doing physical therapy, he did not follow with neurosurgeon, the patient stated that he missed the appointment because he not have any transportation.    COPD:  The patient denies any worsening shortness of breath but is having wheezing, he is out of his inhaler and he needs a new prescription.    Past medical history, past social history was reviewed and discussed with the patient.    Review of Systems   Constitutional: Positive for fatigue. Negative for activity change and appetite change.   HENT: Negative for congestion and ear discharge.    Eyes: Negative for discharge and itching.   Respiratory: Negative for choking and chest tightness.    Cardiovascular: Positive for leg swelling. Negative for chest pain.   Gastrointestinal: Negative for abdominal distention and abdominal pain.   Endocrine: Negative for cold intolerance and heat intolerance.   Genitourinary: Negative for dysuria and flank pain.   Musculoskeletal: Positive for arthralgias, back pain and gait problem.   Skin: Negative for pallor and rash.   Allergic/Immunologic: Negative for environmental allergies and food allergies.   Neurological: Positive for weakness. Negative for dizziness and facial asymmetry.   Hematological: Negative for adenopathy. Does not bruise/bleed easily.   Psychiatric/Behavioral: Positive for sleep disturbance. Negative  for agitation and confusion. The patient is nervous/anxious.        Objective:      Physical Exam  Vitals signs and nursing note reviewed.   Constitutional:       General: He is not in acute distress.     Appearance: He is well-developed. He is not diaphoretic.      Comments: Limited ambulation, the patient is in a wheelchair   HENT:      Head: Normocephalic and atraumatic.      Right Ear: External ear normal.      Left Ear: External ear normal.      Nose: Nose normal.      Mouth/Throat:      Pharynx: No oropharyngeal exudate.   Eyes:      General:         Left eye: No discharge.   Neck:      Musculoskeletal: Normal range of motion and neck supple.   Cardiovascular:      Rate and Rhythm: Normal rate and regular rhythm.      Heart sounds: Normal heart sounds. No murmur. No friction rub.   Pulmonary:      Effort: Pulmonary effort is normal. No respiratory distress.      Breath sounds: Wheezing (On bilateral lung fields) present.   Musculoskeletal:         General: No tenderness.      Right lower leg: Edema (Trace) present.      Left lower leg: Edema (Trace) present.   Skin:     General: Skin is warm and dry.      Coloration: Skin is not pale.      Findings: No erythema.   Neurological:      Cranial Nerves: No cranial nerve deficit.      Motor: No abnormal muscle tone.      Coordination: Coordination normal.   Psychiatric:         Behavior: Behavior normal.         Thought Content: Thought content normal.         Judgment: Judgment normal.         Assessment:       1. S/P lumbar spine operation    2. Screening for AAA (abdominal aortic aneurysm)    3. Chronic obstructive pulmonary disease, unspecified COPD type    4. Essential hypertension    5. Anxiety    6. Localized edema        Plan:       S/P lumbar spine operation:  Stable  -     Ambulatory referral/consult to Neurosurgery; Future; Expected date: 10/30/2020  -     diazePAM (VALIUM) 5 MG tablet; Take 1 tablet (5 mg total) by mouth nightly as needed for Anxiety.   Dispense: 30 tablet; Refill: 2    Screening for AAA (abdominal aortic aneurysm)  -     US Abdominal Aorta; Future; Expected date: 10/23/2020    Chronic obstructive pulmonary disease, unspecified COPD type:  Worsening  -     albuterol (VENTOLIN HFA) 90 mcg/actuation inhaler; Inhale 2 puffs into the lungs every 6 (six) hours as needed for Wheezing. Rescue  Dispense: 18 g; Refill: 0    Essential hypertension:  Uncontrolled  -     atenoloL (TENORMIN) 100 MG tablet; Take 1 tablet (100 mg total) by mouth once daily.  Dispense: 30 tablet; Refill: 11    Anxiety:  Worsening  -     diazePAM (VALIUM) 5 MG tablet; Take 1 tablet (5 mg total) by mouth nightly as needed for Anxiety.  Dispense: 30 tablet; Refill: 2    Localized edema:  Stable  -     furosemide (LASIX) 20 MG tablet; Take 1 tablet (20 mg total) by mouth once daily.  Dispense: 30 tablet; Refill: 5      Healthy habits, decrease salt intake, avoid fried foods, red meat and processed starches, [place] prescription monitoring program was checked and okay.  If the symptoms get worse, the patient will notify us immediately.  The patient will make an appointment for a follow-up with neurosurgeon.  The patient's BMI has been recorded in the chart. The patient has been provided educational materials regarding the benefits of attaining and maintaining a normal weight. We will continue to address and follow this issue during follow up visits.  If the symptoms get worse, the patient will notify us immediately.   Patient agreed with assessment and plan. Patient verbalized understanding.

## 2020-11-03 NOTE — TELEPHONE ENCOUNTER
No new care gaps identified.  Powered by [employer]. Reference number: [id number]. 11/03/2020 9:10:32 AM   CST

## 2020-11-04 RX ORDER — FINASTERIDE 5 MG/1
5 TABLET, FILM COATED ORAL DAILY
Qty: 90 TABLET | Refills: 3 | Status: SHIPPED | OUTPATIENT
Start: 2020-11-04 | End: 2021-09-29 | Stop reason: SDUPTHER

## 2020-11-04 NOTE — PROGRESS NOTES
Refill Routing Note   Medication(s) are not appropriate for processing by [facility] for the following reason(s):     - Medication not previously prescribed by PCP    [facility] actions taken in this encounter: Defer       Medication Therapy Plan: [facility]  Medication reconciliation completed: No   Automatic Epic Generated Protocol Data:        Requested Prescriptions   Pending Prescriptions Disp Refills    finasteride (PROSCAR) 5 mg tablet 90 tablet 3     Sig: Take 1 tablet (5 mg total) by mouth once daily.       Urology: 5-alpha Reductase Inhibitors Passed - 11/3/2020  9:09 AM        Passed - Patient is at least 18 years old        Passed - Office visit in past 12 months or future 90 days     Recent Outpatient Visits            1 week ago S/P lumbar spine operation    [facility] Kalani Velázquez MD    2 months ago Weakness    [facility] Kalani Velázquez MD    3 months ago Chronic bilateral low back pain with bilateral sciatica    [facility] Kalani Velázquez MD    5 months ago Chronic obstructive pulmonary disease, unspecified COPD type    [facility] Radha Horvath MD    5 months ago Sore throat    [facility] Kalani Velázquez MD          Future Appointments              In 2 days [facility]    In 3 days Karyn Zacarias PA-C [facility]    In 3 days [facility]                Passed - Prostate Cancer is not on problem list              Appointments  past 12m or future 3m with PCP    Date Provider   Last Visit   10/23/2020 Kalani Velázquez MD   Next Visit   Visit date not found Kalani Velázquez MD   ED visits in past 90 days: 0        Note composed:6:36 PM 11/03/2020

## 2020-11-05 ENCOUNTER — OFFICE VISIT (OUTPATIENT)
Dept: NEUROSURGERY | Facility: CLINIC | Age: 70
End: 2020-11-05
Payer: MEDICARE

## 2020-11-05 VITALS
HEART RATE: 73 BPM | SYSTOLIC BLOOD PRESSURE: 197 MMHG | HEIGHT: 64 IN | WEIGHT: 185 LBS | BODY MASS INDEX: 31.58 KG/M2 | DIASTOLIC BLOOD PRESSURE: 98 MMHG

## 2020-11-05 DIAGNOSIS — Z98.1 S/P FUSION OF THORACIC SPINE: Primary | ICD-10-CM

## 2020-11-05 PROCEDURE — 3077F SYST BP >= 140 MM HG: CPT | Mod: CPTII,S$GLB,, | Performed by: STUDENT IN AN ORGANIZED HEALTH CARE EDUCATION/TRAINING PROGRAM

## 2020-11-05 PROCEDURE — 1159F MED LIST DOCD IN RCRD: CPT | Mod: S$GLB,,, | Performed by: STUDENT IN AN ORGANIZED HEALTH CARE EDUCATION/TRAINING PROGRAM

## 2020-11-05 PROCEDURE — 3288F PR FALLS RISK ASSESSMENT DOCUMENTED: ICD-10-PCS | Mod: CPTII,S$GLB,, | Performed by: STUDENT IN AN ORGANIZED HEALTH CARE EDUCATION/TRAINING PROGRAM

## 2020-11-05 PROCEDURE — 3080F DIAST BP >= 90 MM HG: CPT | Mod: CPTII,S$GLB,, | Performed by: STUDENT IN AN ORGANIZED HEALTH CARE EDUCATION/TRAINING PROGRAM

## 2020-11-05 PROCEDURE — 1101F PT FALLS ASSESS-DOCD LE1/YR: CPT | Mod: CPTII,S$GLB,, | Performed by: STUDENT IN AN ORGANIZED HEALTH CARE EDUCATION/TRAINING PROGRAM

## 2020-11-05 PROCEDURE — 3080F PR MOST RECENT DIASTOLIC BLOOD PRESSURE >= 90 MM HG: ICD-10-PCS | Mod: CPTII,S$GLB,, | Performed by: STUDENT IN AN ORGANIZED HEALTH CARE EDUCATION/TRAINING PROGRAM

## 2020-11-05 PROCEDURE — 1159F PR MEDICATION LIST DOCUMENTED IN MEDICAL RECORD: ICD-10-PCS | Mod: S$GLB,,, | Performed by: STUDENT IN AN ORGANIZED HEALTH CARE EDUCATION/TRAINING PROGRAM

## 2020-11-05 PROCEDURE — 99213 PR OFFICE/OUTPT VISIT, EST, LEVL III, 20-29 MIN: ICD-10-PCS | Mod: S$GLB,,, | Performed by: STUDENT IN AN ORGANIZED HEALTH CARE EDUCATION/TRAINING PROGRAM

## 2020-11-05 PROCEDURE — 99999 PR PBB SHADOW E&M-EST. PATIENT-LVL V: CPT | Mod: PBBFAC,,, | Performed by: STUDENT IN AN ORGANIZED HEALTH CARE EDUCATION/TRAINING PROGRAM

## 2020-11-05 PROCEDURE — 1101F PR PT FALLS ASSESS DOC 0-1 FALLS W/OUT INJ PAST YR: ICD-10-PCS | Mod: CPTII,S$GLB,, | Performed by: STUDENT IN AN ORGANIZED HEALTH CARE EDUCATION/TRAINING PROGRAM

## 2020-11-05 PROCEDURE — 3288F FALL RISK ASSESSMENT DOCD: CPT | Mod: CPTII,S$GLB,, | Performed by: STUDENT IN AN ORGANIZED HEALTH CARE EDUCATION/TRAINING PROGRAM

## 2020-11-05 PROCEDURE — 99213 OFFICE O/P EST LOW 20 MIN: CPT | Mod: S$GLB,,, | Performed by: STUDENT IN AN ORGANIZED HEALTH CARE EDUCATION/TRAINING PROGRAM

## 2020-11-05 PROCEDURE — 1125F AMNT PAIN NOTED PAIN PRSNT: CPT | Mod: S$GLB,,, | Performed by: STUDENT IN AN ORGANIZED HEALTH CARE EDUCATION/TRAINING PROGRAM

## 2020-11-05 PROCEDURE — 3008F BODY MASS INDEX DOCD: CPT | Mod: CPTII,S$GLB,, | Performed by: STUDENT IN AN ORGANIZED HEALTH CARE EDUCATION/TRAINING PROGRAM

## 2020-11-05 PROCEDURE — 99999 PR PBB SHADOW E&M-EST. PATIENT-LVL V: ICD-10-PCS | Mod: PBBFAC,,, | Performed by: STUDENT IN AN ORGANIZED HEALTH CARE EDUCATION/TRAINING PROGRAM

## 2020-11-05 PROCEDURE — 1125F PR PAIN SEVERITY QUANTIFIED, PAIN PRESENT: ICD-10-PCS | Mod: S$GLB,,, | Performed by: STUDENT IN AN ORGANIZED HEALTH CARE EDUCATION/TRAINING PROGRAM

## 2020-11-05 PROCEDURE — 3077F PR MOST RECENT SYSTOLIC BLOOD PRESSURE >= 140 MM HG: ICD-10-PCS | Mod: CPTII,S$GLB,, | Performed by: STUDENT IN AN ORGANIZED HEALTH CARE EDUCATION/TRAINING PROGRAM

## 2020-11-05 PROCEDURE — 3008F PR BODY MASS INDEX (BMI) DOCUMENTED: ICD-10-PCS | Mod: CPTII,S$GLB,, | Performed by: STUDENT IN AN ORGANIZED HEALTH CARE EDUCATION/TRAINING PROGRAM

## 2020-11-06 ENCOUNTER — HOSPITAL ENCOUNTER (OUTPATIENT)
Dept: RADIOLOGY | Facility: HOSPITAL | Age: 70
Discharge: HOME | End: 2020-11-06
Attending: STUDENT IN AN ORGANIZED HEALTH CARE EDUCATION/TRAINING PROGRAM
Payer: MEDICARE

## 2020-11-06 ENCOUNTER — OFFICE VISIT (OUTPATIENT)
Dept: PAIN MEDICINE | Facility: CLINIC | Age: 70
End: 2020-11-06
Payer: MEDICARE

## 2020-11-06 ENCOUNTER — HOSPITAL ENCOUNTER (OUTPATIENT)
Dept: RADIOLOGY | Facility: HOSPITAL | Age: 70
Discharge: HOME | End: 2020-11-06
Attending: FAMILY MEDICINE
Payer: MEDICARE

## 2020-11-06 ENCOUNTER — OFFICE VISIT (OUTPATIENT)
Dept: FAMILY MEDICINE | Facility: CLINIC | Age: 70
End: 2020-11-06
Payer: MEDICARE

## 2020-11-06 VITALS
SYSTOLIC BLOOD PRESSURE: 150 MMHG | TEMPERATURE: 98 F | HEART RATE: 71 BPM | DIASTOLIC BLOOD PRESSURE: 78 MMHG | OXYGEN SATURATION: 96 % | WEIGHT: 190.5 LBS | BODY MASS INDEX: 32.7 KG/M2

## 2020-11-06 VITALS
BODY MASS INDEX: 32.6 KG/M2 | DIASTOLIC BLOOD PRESSURE: 79 MMHG | OXYGEN SATURATION: 95 % | WEIGHT: 190.94 LBS | HEART RATE: 75 BPM | SYSTOLIC BLOOD PRESSURE: 167 MMHG | HEIGHT: 64 IN | TEMPERATURE: 98 F

## 2020-11-06 DIAGNOSIS — F41.9 ANXIETY: ICD-10-CM

## 2020-11-06 DIAGNOSIS — Z98.1 S/P FUSION OF THORACIC SPINE: Primary | ICD-10-CM

## 2020-11-06 DIAGNOSIS — G89.29 CHRONIC LOW BACK PAIN, UNSPECIFIED BACK PAIN LATERALITY, UNSPECIFIED WHETHER SCIATICA PRESENT: ICD-10-CM

## 2020-11-06 DIAGNOSIS — M54.41 CHRONIC BILATERAL LOW BACK PAIN WITH BILATERAL SCIATICA: ICD-10-CM

## 2020-11-06 DIAGNOSIS — Z98.1 S/P FUSION OF THORACIC SPINE: ICD-10-CM

## 2020-11-06 DIAGNOSIS — M54.42 CHRONIC BILATERAL LOW BACK PAIN WITH BILATERAL SCIATICA: ICD-10-CM

## 2020-11-06 DIAGNOSIS — Z13.6 SCREENING FOR AAA (ABDOMINAL AORTIC ANEURYSM): ICD-10-CM

## 2020-11-06 DIAGNOSIS — I10 ESSENTIAL HYPERTENSION: Primary | ICD-10-CM

## 2020-11-06 DIAGNOSIS — G89.29 CHRONIC BILATERAL LOW BACK PAIN WITH BILATERAL SCIATICA: ICD-10-CM

## 2020-11-06 DIAGNOSIS — Z02.89 PAIN MANAGEMENT CONTRACT AGREEMENT: ICD-10-CM

## 2020-11-06 DIAGNOSIS — M54.50 CHRONIC LOW BACK PAIN, UNSPECIFIED BACK PAIN LATERALITY, UNSPECIFIED WHETHER SCIATICA PRESENT: ICD-10-CM

## 2020-11-06 PROCEDURE — 3077F SYST BP >= 140 MM HG: CPT | Mod: CPTII,S$GLB,, | Performed by: PHYSICIAN ASSISTANT

## 2020-11-06 PROCEDURE — 3288F PR FALLS RISK ASSESSMENT DOCUMENTED: ICD-10-PCS | Mod: CPTII,S$GLB,, | Performed by: PHYSICIAN ASSISTANT

## 2020-11-06 PROCEDURE — 72070 X-RAY EXAM THORAC SPINE 2VWS: CPT | Mod: TC,FY,PO

## 2020-11-06 PROCEDURE — 1159F MED LIST DOCD IN RCRD: CPT | Mod: S$GLB,,, | Performed by: ANESTHESIOLOGY

## 2020-11-06 PROCEDURE — 76775 US EXAM ABDO BACK WALL LIM: CPT | Mod: TC,PO

## 2020-11-06 PROCEDURE — 80307 DRUG TEST PRSMV CHEM ANLYZR: CPT

## 2020-11-06 PROCEDURE — 1100F PTFALLS ASSESS-DOCD GE2>/YR: CPT | Mod: CPTII,S$GLB,, | Performed by: PHYSICIAN ASSISTANT

## 2020-11-06 PROCEDURE — 1159F PR MEDICATION LIST DOCUMENTED IN MEDICAL RECORD: ICD-10-PCS | Mod: S$GLB,,, | Performed by: PHYSICIAN ASSISTANT

## 2020-11-06 PROCEDURE — 1101F PR PT FALLS ASSESS DOC 0-1 FALLS W/OUT INJ PAST YR: ICD-10-PCS | Mod: CPTII,S$GLB,, | Performed by: ANESTHESIOLOGY

## 2020-11-06 PROCEDURE — 3078F PR MOST RECENT DIASTOLIC BLOOD PRESSURE < 80 MM HG: ICD-10-PCS | Mod: CPTII,S$GLB,, | Performed by: PHYSICIAN ASSISTANT

## 2020-11-06 PROCEDURE — 1125F PR PAIN SEVERITY QUANTIFIED, PAIN PRESENT: ICD-10-PCS | Mod: S$GLB,,, | Performed by: ANESTHESIOLOGY

## 2020-11-06 PROCEDURE — 76775 US EXAM ABDO BACK WALL LIM: CPT | Mod: 26,,, | Performed by: RADIOLOGY

## 2020-11-06 PROCEDURE — 76775 US ABDOMINAL AORTA: ICD-10-PCS | Mod: 26,,, | Performed by: RADIOLOGY

## 2020-11-06 PROCEDURE — 99999 PR PBB SHADOW E&M-EST. PATIENT-LVL IV: CPT | Mod: PBBFAC,,, | Performed by: PHYSICIAN ASSISTANT

## 2020-11-06 PROCEDURE — 99214 OFFICE O/P EST MOD 30 MIN: CPT | Mod: S$GLB,,, | Performed by: PHYSICIAN ASSISTANT

## 2020-11-06 PROCEDURE — 72070 XR THORACIC SPINE AP LATERAL: ICD-10-PCS | Mod: 26,,, | Performed by: RADIOLOGY

## 2020-11-06 PROCEDURE — 3288F FALL RISK ASSESSMENT DOCD: CPT | Mod: CPTII,S$GLB,, | Performed by: PHYSICIAN ASSISTANT

## 2020-11-06 PROCEDURE — 1125F PR PAIN SEVERITY QUANTIFIED, PAIN PRESENT: ICD-10-PCS | Mod: S$GLB,,, | Performed by: PHYSICIAN ASSISTANT

## 2020-11-06 PROCEDURE — 3078F DIAST BP <80 MM HG: CPT | Mod: CPTII,S$GLB,, | Performed by: PHYSICIAN ASSISTANT

## 2020-11-06 PROCEDURE — 99999 PR PBB SHADOW E&M-EST. PATIENT-LVL IV: ICD-10-PCS | Mod: PBBFAC,,, | Performed by: PHYSICIAN ASSISTANT

## 2020-11-06 PROCEDURE — 99999 PR PBB SHADOW E&M-EST. PATIENT-LVL IV: ICD-10-PCS | Mod: PBBFAC,,, | Performed by: ANESTHESIOLOGY

## 2020-11-06 PROCEDURE — 3077F PR MOST RECENT SYSTOLIC BLOOD PRESSURE >= 140 MM HG: ICD-10-PCS | Mod: CPTII,S$GLB,, | Performed by: ANESTHESIOLOGY

## 2020-11-06 PROCEDURE — 1125F AMNT PAIN NOTED PAIN PRSNT: CPT | Mod: S$GLB,,, | Performed by: PHYSICIAN ASSISTANT

## 2020-11-06 PROCEDURE — 1100F PR PT FALLS ASSESS DOC 2+ FALLS/FALL W/INJURY/YR: ICD-10-PCS | Mod: CPTII,S$GLB,, | Performed by: PHYSICIAN ASSISTANT

## 2020-11-06 PROCEDURE — 3008F PR BODY MASS INDEX (BMI) DOCUMENTED: ICD-10-PCS | Mod: CPTII,S$GLB,, | Performed by: PHYSICIAN ASSISTANT

## 2020-11-06 PROCEDURE — 99214 PR OFFICE/OUTPT VISIT, EST, LEVL IV, 30-39 MIN: ICD-10-PCS | Mod: S$GLB,,, | Performed by: ANESTHESIOLOGY

## 2020-11-06 PROCEDURE — 3078F PR MOST RECENT DIASTOLIC BLOOD PRESSURE < 80 MM HG: ICD-10-PCS | Mod: CPTII,S$GLB,, | Performed by: ANESTHESIOLOGY

## 2020-11-06 PROCEDURE — 1159F PR MEDICATION LIST DOCUMENTED IN MEDICAL RECORD: ICD-10-PCS | Mod: S$GLB,,, | Performed by: ANESTHESIOLOGY

## 2020-11-06 PROCEDURE — 3077F PR MOST RECENT SYSTOLIC BLOOD PRESSURE >= 140 MM HG: ICD-10-PCS | Mod: CPTII,S$GLB,, | Performed by: PHYSICIAN ASSISTANT

## 2020-11-06 PROCEDURE — 3077F SYST BP >= 140 MM HG: CPT | Mod: CPTII,S$GLB,, | Performed by: ANESTHESIOLOGY

## 2020-11-06 PROCEDURE — 1125F AMNT PAIN NOTED PAIN PRSNT: CPT | Mod: S$GLB,,, | Performed by: ANESTHESIOLOGY

## 2020-11-06 PROCEDURE — 3078F DIAST BP <80 MM HG: CPT | Mod: CPTII,S$GLB,, | Performed by: ANESTHESIOLOGY

## 2020-11-06 PROCEDURE — 72070 X-RAY EXAM THORAC SPINE 2VWS: CPT | Mod: 26,,, | Performed by: RADIOLOGY

## 2020-11-06 PROCEDURE — 3008F PR BODY MASS INDEX (BMI) DOCUMENTED: ICD-10-PCS | Mod: CPTII,S$GLB,, | Performed by: ANESTHESIOLOGY

## 2020-11-06 PROCEDURE — 99214 PR OFFICE/OUTPT VISIT, EST, LEVL IV, 30-39 MIN: ICD-10-PCS | Mod: S$GLB,,, | Performed by: PHYSICIAN ASSISTANT

## 2020-11-06 PROCEDURE — 1101F PT FALLS ASSESS-DOCD LE1/YR: CPT | Mod: CPTII,S$GLB,, | Performed by: ANESTHESIOLOGY

## 2020-11-06 PROCEDURE — 1159F MED LIST DOCD IN RCRD: CPT | Mod: S$GLB,,, | Performed by: PHYSICIAN ASSISTANT

## 2020-11-06 PROCEDURE — 99214 OFFICE O/P EST MOD 30 MIN: CPT | Mod: S$GLB,,, | Performed by: ANESTHESIOLOGY

## 2020-11-06 PROCEDURE — 3008F BODY MASS INDEX DOCD: CPT | Mod: CPTII,S$GLB,, | Performed by: ANESTHESIOLOGY

## 2020-11-06 PROCEDURE — 99999 PR PBB SHADOW E&M-EST. PATIENT-LVL IV: CPT | Mod: PBBFAC,,, | Performed by: ANESTHESIOLOGY

## 2020-11-06 PROCEDURE — 3008F BODY MASS INDEX DOCD: CPT | Mod: CPTII,S$GLB,, | Performed by: PHYSICIAN ASSISTANT

## 2020-11-06 RX ORDER — TELMISARTAN AND HYDROCHLORTHIAZIDE 80; 12.5 MG/1; MG/1
1 TABLET ORAL DAILY
Qty: 90 TABLET | Refills: 3 | Status: SHIPPED | OUTPATIENT
Start: 2020-11-06 | End: 2021-09-29 | Stop reason: SDUPTHER

## 2020-11-06 NOTE — PROGRESS NOTES
[facility] Follow Up Evaluation    Referred by: Dr. Velázquez  Reason for referral: shoulder pain    CC:   Chief Complaint   Patient presents with    Leg Pain     both    Low-back Pain      Last 3 PDI Scores 11/6/2020 12/2/2019   Pain Disability Index (PDI) 30 40       Interval HPI 11/6/20: Mr. Medrano returns to the office for follow up.  Since I last saw him he presented to the ED in May 2020 acutely intoxicated with weakness and inability to move bilateral lower extremities for approximately for 1 week.  He was found to have a T8-9 irregular disc extrusion causing moderate-severe cord flattening with suggestion of cord signal abnormality.  He is now s/p T7-T10 PSIF with T8-T9 laminectomies/costotransversectomy and discectomy on 5/29/20.   Today he reports he continues to have back pain radiating down the back of his legs, constant, burning, 7/10.  He reports numbness and weakness of his legs.  Difficulty with balance.  Has been doing PT 2x/week.  He has been taking hydrocodone for pain.  He also takes valium for anxiety.      HPI:   Alexis Medrano Jr. is a 70 y.o. male who complains of shoulder pain    Onset: 30 years   Inciting Event: none  Progression: since onset, pain is gradually worsening  Current Pain Score: 8/10  Timing: intermittent  Quality: aching  Radiation: yes, down both arms  Associated numbness or weakness: no numbness, no weakness  Exacerbated by: lifting  Allievated by: nothing  Is Pain Level Acceptable?: No    Previous Therapies:  PT/OT: yes, in the past  HEP:   Interventions:   Surgery:  Medications:   - NSAIDS: aleve  - MSK Relaxants:   - TCAs:   - SNRIs:   - Topicals:   - Anticonvulsants:  - Opioids: hydrocodone    History:    Current Outpatient Medications:     acetaminophen (TYLENOL) 325 MG tablet, Take 2 tablets (650 mg total) by mouth every 4 (four) hours as needed., Disp: , Rfl: 0    albuterol (VENTOLIN HFA) 90 mcg/actuation inhaler, Inhale 2 puffs into the lungs every 6 (six)  hours as needed for Wheezing. Rescue, Disp: 18 g, Rfl: 0    albuterol-ipratropium (DUO-NEB) 2.5 mg-0.5 mg/3 mL nebulizer solution, Take 3 mLs by nebulization every 6 (six) hours as needed for Wheezing. Rescue, Disp: 120 vial, Rfl: 5    aspirin (ECOTRIN) 81 MG EC tablet, Take 81 mg by mouth once daily., Disp: , Rfl:     atenoloL (TENORMIN) 100 MG tablet, Take 1 tablet (100 mg total) by mouth once daily., Disp: 30 tablet, Rfl: 11    azelastine (ASTELIN) 137 mcg (0.1 %) nasal spray, 1 spray (137 mcg total) by Nasal route 2 (two) times daily., Disp: 90 mL, Rfl: 3    diazePAM (VALIUM) 5 MG tablet, Take 1 tablet (5 mg total) by mouth nightly as needed for Anxiety., Disp: 30 tablet, Rfl: 2    escitalopram oxalate (LEXAPRO) 20 MG tablet, Take 1 tablet (20 mg total) by mouth every evening., Disp: 90 tablet, Rfl: 3    ferrous sulfate (FEOSOL) 325 mg (65 mg iron) Tab tablet, Take 1 tablet (325 mg total) by mouth once daily., Disp: 30 tablet, Rfl: 5    finasteride (PROSCAR) 5 mg tablet, Take 1 tablet (5 mg total) by mouth once daily., Disp: 90 tablet, Rfl: 3    fluticasone propionate (FLONASE) 50 mcg/actuation nasal spray, SHAKE LIQUID AND USE 1 SPRAY(50 MCG) IN EACH NOSTRIL EVERY DAY, Disp: 48 g, Rfl: 0    furosemide (LASIX) 20 MG tablet, Take 1 tablet (20 mg total) by mouth once daily., Disp: 30 tablet, Rfl: 5    ibuprofen (ADVIL,MOTRIN) 400 MG tablet, Take 1 tablet (400 mg total) by mouth 2 (two) times daily as needed for Other (pain)., Disp: 40 tablet, Rfl: 0    montelukast (SINGULAIR) 10 mg tablet, TAKE 1 TABLET(10 MG) BY MOUTH EVERY EVENING, Disp: 90 tablet, Rfl: 0    multivitamin (ONE DAILY MULTIVITAMIN) per tablet, Take 1 tablet by mouth once daily., Disp: , Rfl:     nicotine (NICODERM CQ) 14 mg/24 hr, Place 1 patch onto the skin once daily., Disp: 30 patch, Rfl: 0    polyethylene glycol (GLYCOLAX) 17 gram PwPk, Take 17 g by mouth once daily., Disp: 30 each, Rfl: 0    potassium chloride SA  (K-DUR,KLOR-CON) 20 MEQ tablet, TAKE 1 TABLET(20 MEQ) BY MOUTH EVERY DAY, Disp: 90 tablet, Rfl: 3    rosuvastatin (CRESTOR) 20 MG tablet, TAKE 1 TABLET(20 MG) BY MOUTH EVERY DAY, Disp: 90 tablet, Rfl: 1    sildenafiL (VIAGRA) 100 MG tablet, Take 1 tablet (100 mg total) by mouth daily as needed., Disp: 6 tablet, Rfl: 11    telmisartan-hydrochlorothiazide (MICARDIS HCT) 80-12.5 mg per tablet, Take 1 tablet by mouth once daily., Disp: 90 tablet, Rfl: 3    gabapentin (NEURONTIN) 300 MG capsule, Take 1 capsule (300 mg total) by mouth 2 (two) times daily. (Patient not taking: Reported on 5/29/2020), Disp: 60 capsule, Rfl: 2    Past Medical History:   Diagnosis Date    Depression     Hyperlipidemia 5/8/2019    Hypertension        Past Surgical History:   Procedure Laterality Date    CORONARY ARTERY BYPASS GRAFT      LAMINECTOMY OF THORACIC SPINE BY POSTERIOR APPROACH USING COMPUTER-ASSISTED NAVIGATION  5/29/2020    Procedure: LAMINECTOMY, SPINE, THORACIC, POSTERIOR APPROACH, USING COMPUTER-ASSISTED NAVIGATION T7-9;  Surgeon: Elmer Connell MD;  Location: [facility] OR;  Service: Neurosurgery;;    SURGICAL REMOVAL OF VERTEBRAL BODY OF THORACIC SPINE N/A 5/29/2020    Procedure: CORPECTOMY, SPINE, THORACIC - Partial T8 - T9 TRANSPEDICULAR/ COSTOTRANSVESECTOMY  T9;  Surgeon: Elmer Connell MD;  Location: [facility] OR;  Service: Neurosurgery;  Laterality: N/A;    THORACIC LAMINECTOMY WITH FUSION N/A 5/29/2020    Procedure: LAMINECTOMY, SPINE, THORACIC WITH FUSION- T7-10;  Surgeon: Elmer Connell MD;  Location: [facility] OR;  Service: Neurosurgery;  Laterality: N/A;       Family History   Problem Relation Age of Onset    Diabetes Mother     No Known Problems Father     Cancer Sister         pt does not know what kind    Diabetes Sister     No Known Problems Sister     No Known Problems Sister        Social History     Socioeconomic History    Marital status:      Spouse name: Not on file    Number of children: Not on  file    Years of education: Not on file    Highest education level: Not on file   Occupational History    Not on file   Social Needs    Financial resource strain: Not on file    Food insecurity     Worry: Not on file     Inability: Not on file    Transportation needs     Medical: Not on file     Non-medical: Not on file   Tobacco Use    Smoking status: Current Every Day Smoker     Packs/day: 1.00    Smokeless tobacco: Never Used    Tobacco comment: Age Started: 14   Substance and Sexual Activity    Alcohol use: Yes     Comment: ocassional     Drug use: Not on file    Sexual activity: Not on file   Lifestyle    Physical activity     Days per week: Not on file     Minutes per session: Not on file    Stress: Not on file   Relationships    Social connections     Talks on phone: Not on file     Gets together: Not on file     Attends Bahai service: Not on file     Active member of club or organization: Not on file     Attends meetings of clubs or organizations: Not on file     Relationship status: Not on file   Other Topics Concern    Not on file   Social History Narrative    Not on file       Review of patient's allergies indicates:  No Known Allergies    Review of Systems:  General ROS: negative for - fever  Psychological ROS: negative for - hostility  Hematological and Lymphatic ROS: negative for - bleeding problems  Endocrine ROS: negative for - unexpected weight changes  Respiratory ROS: no cough, shortness of breath, or wheezing  Cardiovascular ROS: no chest pain or dyspnea on exertion  Gastrointestinal ROS: no abdominal pain, change in bowel habits, or black or bloody stools  Musculoskeletal ROS: negative for - muscular weakness  Neurological ROS: negative for - bowel and bladder control changes or numbness/tingling  Dermatological ROS: negative for rash    Physical Exam:  Vitals:    11/06/20 1427   BP: (!) 167/79   Pulse: 75   Temp: 97.8 °F (36.6 °C)   TempSrc: Temporal   SpO2: 95%   Weight:  "86.6 kg (190 lb 14.7 oz)   Height: 5' 4" (1.626 m)   PainSc:   7   PainLoc: Back     Body mass index is 32.77 kg/m².     Gen: NAD  Gait: gait intact  Psych:  Mood appropriate for given condition  HEENT: eyes anicteric   GI: Abd soft  CV: RRR  Lungs: breathing unlabored   ROM: limited AROM of the L spine in all planes, full ROM at ankles, knees and hips  Lumbar flexion 50 degrees, extension 30 degrees, side bending 30 degrees.    Sensation: intact to light touch in all dermatomes tested from L2-S1 bilaterally  Reflexes: 3+ left patella, 2+ right patella and 2+ b/l achilles  Palpation: Diffusely tender over lumbar paraspinals  -TTP over the b/l greater trochanters and bilateral SI joint  Tone: normal in the b/l knees and hips   Skin: intact  Extremities: No edema in b/l ankles or hands       Right Left   L2/3 Iliacus Hip flexion  4  4   L3/4 Qudratus Femoris Knee Extension  5-  5-   L4/5 Tib Anterior Ankle Dorsiflexion   5  5   L5/S1 Extensor Hallicus Longus Great toe extension  5  5                 S1/S2 Gastroc/Soleus Plantar Flexion  5  5     Imaging:  Xray thoracic spine 11/6/20  FINDINGS:  Postsurgical changes of posterior instrumented fusion with bipedicular screws and vertical stabilization rods at T7-T10 again demonstrated.  Hardware appears well aligned without evidence of acute hardware complication.  Thoracic vertebral body heights appear maintained without evidence of acute fracture.  Multilevel degenerative disc disease with anterior marginal osteophyte formation, endplate sclerosis and disc space narrowing.  Postoperative changes of prior sternotomy and CABG procedure.  Calcific plaque projects over the aortic arch.    MRI lumbar spine 5/29/20  FINDINGS:  The lumbar vertebral body heights appear to be maintained.  The static anterior-posterior lumbar vertebral body alignment appears to be within normal limits.  Discogenic endplate degenerative signal changes are seen, most evident the L5-S1 level.  There " also appears to be periarticular edema about the bilateral L5 facet joints with small amount of fluid signal intensity within the facet joints, likely related to active facet arthritis.  There appear to be varying degrees of mild disc desiccation throughout the visualized spine, most pronounced at L4-L5.    the conus medullaris terminate at approximately the L1 level.     L1-L2 demonstrates minimal broad-based disc bulge and mild bilateral facet arthrosis without significant overall central spinal canal or neural foraminal stenosis.  L2-L3 demonstrates mild broad-based disc bulge and bilateral facet arthrosis without significant central spinal canal stenosis.  Mild bilateral lateral recess and neural foraminal narrowing is seen.  L3-L4 demonstrates mild broad-based disc bulge and mild bilateral facet arthrosis without significant central spinal canal stenosis.  Mild left greater than right lateral recess narrowing is seen.  Mild bilateral neural foraminal narrowing is noted.  L4-L5 demonstrates moderate disc space narrowing, severe broad-based disc bulge, superimposed right paracentral to central disc extrusion which extends approximately 6 mm cranially, ligamentum flavum hypertrophy, and severe facet arthrosis.  Moderate central spinal canal stenosis is seen with the AP diameter of the central thecal sac measuring approximately 7 mm.  Severe bilateral lateral recess and severe left neural foraminal narrowing is seen.  Moderate to severe right neural foraminal narrowing is noted.  L5-S1 demonstrates moderate disc space narrowing, severe broad-based disc bulge slightly asymmetric to the right, superimposed right paracentral annular fissure, and severe bilateral facet arthrosis.  Moderate central spinal canal stenosis is seen with AP diameter of the central thecal sac measuring approximately 7 mm.  Severe bilateral lateral recess and neural foraminal narrowing is seen.     There appear to be small T2 hyperintense foci  in both kidneys which are incompletely evaluated on the current study, statistically likely reflective of cysts.  This could be confirmed with renal ultrasound.    Labs:  BMP  Lab Results   Component Value Date     09/23/2020    K 3.5 09/23/2020     09/23/2020    CO2 25 09/23/2020    BUN 9 09/23/2020    CREATININE 0.8 09/23/2020    CALCIUM 8.8 09/23/2020    ANIONGAP 11 09/23/2020    ESTGFRAFRICA >60.0 09/23/2020    EGFRNONAA >60.0 09/23/2020     Lab Results   Component Value Date    ALT 28 09/23/2020    AST 22 09/23/2020    ALKPHOS 80 09/23/2020    BILITOT 0.9 09/23/2020       Assessment:  Problem List Items Addressed This Visit        Orthopedic    Chronic bilateral low back pain with bilateral sciatica      Other Visit Diagnoses     S/P fusion of thoracic spine    -  Primary    Anxiety        Relevant Orders    Ambulatory referral/consult to Psychiatry    Pain management contract agreement        Relevant Orders    Pain Clinic Drug Screen          Treatment Plan:  70 y.o. year old male with PMH HTN, CAD presents to the office with back pain    11/6/20 - Mr. Medrano returns to the office for follow up.  Since I last saw him he presented to the ED in May 2020 acutely intoxicated with weakness and inability to move bilateral lower extremities for approximately for 1 week.  He was found to have a T8-9 irregular disc extrusion causing moderate-severe cord flattening with suggestion of cord signal abnormality.  He is now s/p T7-T10 PSIF with T8-T9 laminectomies/costotransversectomy and discectomy on 5/29/20.  Today he reports he continues to have back pain radiating down the back of his legs, constant, burning, 7/10.  He reports numbness and weakness of his legs.  Difficulty with balance.  Has been doing PT 2x/week.  He has been taking hydrocodone for pain.  He also takes valium for anxiety.       On exam he has some weakness with bilateral hip flexion.  3+ left patellar and 2+ right patellar, 2+ b/l  achilles.  Sensation to light touch appears intact to light touch.  I independently reviewed his lumbar MRI that was done at the time of his ED presentation and it is c/w L4-5 moderate central spinal canal stenosis with severe bilateral lateral recess and severe left neural foraminal narrowing and moderate to severe right neural foraminal narrowing and L5-S1 moderate central spinal canal stenosis with severe bilateral lateral recess and neural foraminal narrowing is seen.    He had a traumatic thoracic cord injury that is likely contributing to his continued pain and he also may have some pain related to the lumbar spine.  He is known to have a problem with alcohol in the past.  He takes valium for anxiety.  This combination puts him at risk of adverse events and we discussed those inlcude oversedation, respiratory depression, and even death.   He tells me his last drink was in May of this year.  He reports he has tried gabapentin and but does not work for him.  Discussed he will need to do a UDS today and also see a psychiatrist to discuss alternative, non-benzo options for his anxiety.  Once he has done this I can consider low dose opioid to help improve his pain while he continues to rehab from his injury.  I also think we should try a lumbar EULA in the future as that may also help improve some of his pain.      Procedures: as above  PT/OT/HEP: continue physical therapy and home exercises.    Medications: as abaove  Labs: Reviewed and medications are appropriately dosed for current hepatorenal function.  Imaging: no additional at this time    : Reviewed    Marquis Hong M.D.  Interventional Pain Medicine / Anesthesiology

## 2020-11-06 NOTE — LETTER
November 6, 2020      Elmer Connell MD  [address] [facility] [address] [place] [address]           [facility]  [address] [place] [address]  Phone: [phone]  Fax: [phone]          Patient: Alexis Medrano Jr.   MR Number: [id number]   YOB: 1950   Date of Visit: 11/6/2020       Dear Dr. Elmer Connell:    Thank you for referring Alexis Medrano to me for evaluation. Attached you will find relevant portions of my assessment and plan of care.    If you have questions, please do not hesitate to call me. I look forward to following Alexis Medrano along with you.    Sincerely,    Marquis Hong MD    Enclosure  CC:  No Recipients    If you would like to receive this communication electronically, please contact [email or web address] or [phone] to request more information on [employer] Link access.    For providers and/or their staff who would like to refer a patient to [facility], please contact us through our one-stop-shop provider referral line, [facility], at [phone].    If you feel you have received this communication in error or would no longer like to receive these types of communications, please e-mail [email or web address]

## 2020-11-06 NOTE — PROGRESS NOTES
Subjective:       Patient ID: Alexis Medrano Jr. is a 70 y.o. male    Chief Complaint: Follow-up and Hypertension    HPI  The patient is new to me, PCP id Dr Velázquez.  He presents today for 2 week follow up for his hypertension.  His atenolol was increased to 100 mg daily.  He is also prescribed telmisartan 40 mg daily.  He reports being compliant his medications and he denies any recent headaches or blurred vision.    He chronic low back pain.  He had a spine surgery Dr. Connell and may.  He has been doing physical therapy with some improvement.  He has an appointment with pain management today to get established with Dr. Hong.        Review of Systems   Constitutional: Negative for chills and fever.   HENT: Negative for congestion and sore throat.    Eyes: Negative for visual disturbance.   Respiratory: Negative for cough and shortness of breath.    Cardiovascular: Negative for chest pain.   Gastrointestinal: Negative for diarrhea, nausea and vomiting.   Musculoskeletal: Positive for back pain. Negative for arthralgias.   Skin: Negative for rash.   Neurological: Negative for headaches.   Psychiatric/Behavioral: Negative for sleep disturbance.        Objective:   Physical Exam  Constitutional:       General: He is not in acute distress.     Appearance: He is well-developed.   HENT:      Head: Normocephalic and atraumatic.      Right Ear: External ear normal.      Left Ear: External ear normal.      Nose: Nose normal.   Eyes:      Conjunctiva/sclera: Conjunctivae normal.      Pupils: Pupils are equal, round, and reactive to light.   Neck:      Musculoskeletal: Normal range of motion and neck supple.      Vascular: No JVD.   Cardiovascular:      Rate and Rhythm: Normal rate and regular rhythm.      Heart sounds: Normal heart sounds. No murmur. No friction rub. No gallop.    Pulmonary:      Effort: Pulmonary effort is normal. No respiratory distress.      Breath sounds: Normal breath sounds. No wheezing or rales.   Abdominal:       General: Bowel sounds are normal. There is no distension.      Palpations: Abdomen is soft. There is no mass.      Tenderness: There is no abdominal tenderness. There is no guarding.   Musculoskeletal: Normal range of motion.   Skin:     General: Skin is warm and dry.   Neurological:      Mental Status: He is alert and oriented to person, place, and time.   Psychiatric:         Behavior: Behavior normal.         Thought Content: Thought content normal.         Judgment: Judgment normal.          Assessment:       1. Essential hypertension  telmisartan-hydrochlorothiazide (MICARDIS HCT) 80-12.5 mg per tablet   2. Chronic low back pain, unspecified back pain laterality, unspecified whether sciatica present          Plan:       Essential hypertension  -     START telmisartan-hydrochlorothiazide (MICARDIS HCT) 80-12.5 mg per tablet; Take 1 tablet by mouth once daily.  Dispense: 90 tablet; Refill: 3  - Continue atenolol  - RTC 1 month    Chronic low back pain, unspecified back pain laterality, unspecified whether sciatica present  - Start pain management as planned

## 2020-11-09 ENCOUNTER — TELEPHONE (OUTPATIENT)
Dept: FAMILY MEDICINE | Facility: CLINIC | Age: 70
End: 2020-11-09

## 2020-11-09 NOTE — TELEPHONE ENCOUNTER
----- Message from Teri Gusman sent at 11/9/2020  9:01 AM CST -----  Contact: pt  Type: Needs Medical Advice    Who Called:  PT  Best Call Back Number: [phone]    Additional Information: Requesting a call back regarding pt visit he had   Please Advise ---Thank you

## 2020-11-10 ENCOUNTER — TELEPHONE (OUTPATIENT)
Dept: FAMILY MEDICINE | Facility: CLINIC | Age: 70
End: 2020-11-10

## 2020-11-10 LAB
6MAM UR QL: NOT DETECTED
7AMINOCLONAZEPAM UR QL: NOT DETECTED
A-OH ALPRAZ UR QL: NOT DETECTED
ALPRAZ UR QL: NOT DETECTED
AMPHET UR QL SCN: NOT DETECTED
ANNOTATION COMMENT IMP: NORMAL
ANNOTATION COMMENT IMP: NORMAL
BARBITURATES UR QL: NOT DETECTED
BUPRENORPHINE UR QL: NOT DETECTED
BZE UR QL: NOT DETECTED
CARBOXYTHC UR QL: NOT DETECTED
CARISOPRODOL UR QL: NOT DETECTED
CLONAZEPAM UR QL: NOT DETECTED
CODEINE UR QL: NOT DETECTED
CREAT UR-MCNC: 116.8 MG/DL (ref 20–400)
DIAZEPAM UR QL: NOT DETECTED
ETHYL GLUCURONIDE UR QL: NOT DETECTED
FENTANYL UR QL: NOT DETECTED
HYDROCODONE UR QL: NOT DETECTED
HYDROMORPHONE UR QL: NOT DETECTED
LORAZEPAM UR QL: NOT DETECTED
MDA UR QL: NOT DETECTED
MDEA UR QL: NOT DETECTED
MDMA UR QL: NOT DETECTED
ME-PHENIDATE UR QL: NOT DETECTED
MEPERIDINE UR QL: NOT DETECTED
METHADONE UR QL: NOT DETECTED
METHAMPHET UR QL: NOT DETECTED
MIDAZOLAM UR QL SCN: NOT DETECTED
MORPHINE UR QL: NOT DETECTED
NORBUPRENORPHINE UR QL CFM: NOT DETECTED
NORDIAZEPAM UR QL: PRESENT
NORFENTANYL UR QL: NOT DETECTED
NORHYDROCODONE UR QL CFM: NOT DETECTED
NOROXYCODONE UR QL CFM: NOT DETECTED
NOROXYMORPHONE: NOT DETECTED
OXAZEPAM UR QL: PRESENT
OXYCODONE UR QL: NOT DETECTED
OXYMORPHONE UR QL: NOT DETECTED
PATHOLOGY STUDY: NORMAL
PCP UR QL: NOT DETECTED
PHENTERMINE UR QL: NOT DETECTED
PROPOXYPH UR QL: NOT DETECTED
SERVICE CMNT-IMP: NORMAL
TAPENTADOL UR QL SCN: NOT DETECTED
TAPENTADOL-O-SULF: NOT DETECTED
TEMAZEPAM UR QL: PRESENT
TRAMADOL UR QL: NOT DETECTED
ZOLPIDEM UR QL: NOT DETECTED

## 2020-11-10 NOTE — TELEPHONE ENCOUNTER
----- Message from Mila Vergara sent at 11/10/2020  3:18 PM CST -----  Contact: self  Type: Needs Medical Advice  Who Called:  patient     Best Call Back Number: [phone]    Additional Information: patient has questions regarding his medication , please contact to advise.

## 2020-11-11 ENCOUNTER — DOCUMENTATION ONLY (OUTPATIENT)
Dept: FAMILY MEDICINE | Facility: CLINIC | Age: 70
End: 2020-11-11

## 2020-11-11 NOTE — PROGRESS NOTES
Telmisartan-HCTZ 80-12.5  Mg tablets has been APPROVED by [facility]  Valid 1/1/2020 - 12/31/2021

## 2020-11-13 ENCOUNTER — TELEPHONE (OUTPATIENT)
Dept: FAMILY MEDICINE | Facility: CLINIC | Age: 70
End: 2020-11-13

## 2020-11-13 NOTE — TELEPHONE ENCOUNTER
Spoke with pt informed him of results and recommendations per Dr Velázquez pt verbalized understanding phone call ended.

## 2020-11-20 ENCOUNTER — OFFICE VISIT (OUTPATIENT)
Dept: FAMILY MEDICINE | Facility: CLINIC | Age: 70
End: 2020-11-20
Payer: MEDICARE

## 2020-11-20 VITALS
TEMPERATURE: 99 F | SYSTOLIC BLOOD PRESSURE: 136 MMHG | OXYGEN SATURATION: 98 % | HEART RATE: 59 BPM | BODY MASS INDEX: 31.83 KG/M2 | WEIGHT: 185.44 LBS | DIASTOLIC BLOOD PRESSURE: 88 MMHG

## 2020-11-20 DIAGNOSIS — G89.29 CHRONIC LOW BACK PAIN, UNSPECIFIED BACK PAIN LATERALITY, UNSPECIFIED WHETHER SCIATICA PRESENT: Primary | ICD-10-CM

## 2020-11-20 DIAGNOSIS — I10 ESSENTIAL HYPERTENSION: ICD-10-CM

## 2020-11-20 DIAGNOSIS — Z98.890 S/P LUMBAR SPINE OPERATION: ICD-10-CM

## 2020-11-20 DIAGNOSIS — M54.50 CHRONIC LOW BACK PAIN, UNSPECIFIED BACK PAIN LATERALITY, UNSPECIFIED WHETHER SCIATICA PRESENT: Primary | ICD-10-CM

## 2020-11-20 PROCEDURE — 3079F DIAST BP 80-89 MM HG: CPT | Mod: CPTII,S$GLB,, | Performed by: PHYSICIAN ASSISTANT

## 2020-11-20 PROCEDURE — 99999 PR PBB SHADOW E&M-EST. PATIENT-LVL V: CPT | Mod: PBBFAC,,, | Performed by: PHYSICIAN ASSISTANT

## 2020-11-20 PROCEDURE — 3079F PR MOST RECENT DIASTOLIC BLOOD PRESSURE 80-89 MM HG: ICD-10-PCS | Mod: CPTII,S$GLB,, | Performed by: PHYSICIAN ASSISTANT

## 2020-11-20 PROCEDURE — 3008F BODY MASS INDEX DOCD: CPT | Mod: CPTII,S$GLB,, | Performed by: PHYSICIAN ASSISTANT

## 2020-11-20 PROCEDURE — 3075F SYST BP GE 130 - 139MM HG: CPT | Mod: CPTII,S$GLB,, | Performed by: PHYSICIAN ASSISTANT

## 2020-11-20 PROCEDURE — 1159F PR MEDICATION LIST DOCUMENTED IN MEDICAL RECORD: ICD-10-PCS | Mod: S$GLB,,, | Performed by: PHYSICIAN ASSISTANT

## 2020-11-20 PROCEDURE — 1125F AMNT PAIN NOTED PAIN PRSNT: CPT | Mod: S$GLB,,, | Performed by: PHYSICIAN ASSISTANT

## 2020-11-20 PROCEDURE — 99214 OFFICE O/P EST MOD 30 MIN: CPT | Mod: S$GLB,,, | Performed by: PHYSICIAN ASSISTANT

## 2020-11-20 PROCEDURE — 3008F PR BODY MASS INDEX (BMI) DOCUMENTED: ICD-10-PCS | Mod: CPTII,S$GLB,, | Performed by: PHYSICIAN ASSISTANT

## 2020-11-20 PROCEDURE — 1159F MED LIST DOCD IN RCRD: CPT | Mod: S$GLB,,, | Performed by: PHYSICIAN ASSISTANT

## 2020-11-20 PROCEDURE — 99999 PR PBB SHADOW E&M-EST. PATIENT-LVL V: ICD-10-PCS | Mod: PBBFAC,,, | Performed by: PHYSICIAN ASSISTANT

## 2020-11-20 PROCEDURE — 99214 PR OFFICE/OUTPT VISIT, EST, LEVL IV, 30-39 MIN: ICD-10-PCS | Mod: S$GLB,,, | Performed by: PHYSICIAN ASSISTANT

## 2020-11-20 PROCEDURE — 3075F PR MOST RECENT SYSTOLIC BLOOD PRESS GE 130-139MM HG: ICD-10-PCS | Mod: CPTII,S$GLB,, | Performed by: PHYSICIAN ASSISTANT

## 2020-11-20 PROCEDURE — 1125F PR PAIN SEVERITY QUANTIFIED, PAIN PRESENT: ICD-10-PCS | Mod: S$GLB,,, | Performed by: PHYSICIAN ASSISTANT

## 2020-11-20 NOTE — PROGRESS NOTES
Subjective:       Patient ID: Alexis Medrano Jr. is a 70 y.o. male    Chief Complaint: Follow-up (2wks)    HPI  The patient is familiar to me. PCP is Dr Velázquez.  He presents today for 2 week follow up for his hypertension.  He is currently prescribed telmisartan HCTZ 80-12.5 and atenolol.   He has noticed a good improvement in his blood pressure since he started the telmisartan HCTZ and his home BP before coming in today was 130/82.  He denies any recent headache or blurred vision.  He is still having chronic back pain.  He followed up with pain management, Dr Hong.  The patient was not happy because he was not given a Rx for pain pill and was referred to psyc for evaluation.  He is interested in finding a new pain management physician.      Review of Systems   Constitutional: Negative for chills and fever.   HENT: Negative for congestion and sore throat.    Eyes: Negative for visual disturbance.   Respiratory: Negative for cough and shortness of breath.    Cardiovascular: Negative for chest pain.   Gastrointestinal: Negative for diarrhea, nausea and vomiting.   Musculoskeletal: Positive for back pain. Negative for arthralgias.   Skin: Negative for rash.   Neurological: Negative for headaches.   Psychiatric/Behavioral: Negative for sleep disturbance.        Objective:   Physical Exam  Constitutional:       General: He is not in acute distress.     Appearance: He is well-developed.   HENT:      Head: Normocephalic and atraumatic.      Right Ear: External ear normal.      Left Ear: External ear normal.      Nose: Nose normal.   Eyes:      Conjunctiva/sclera: Conjunctivae normal.      Pupils: Pupils are equal, round, and reactive to light.   Neck:      Musculoskeletal: Normal range of motion and neck supple.   Cardiovascular:      Rate and Rhythm: Normal rate and regular rhythm.      Heart sounds: Normal heart sounds. No murmur.   Pulmonary:      Effort: Pulmonary effort is normal. No respiratory distress.      Breath  sounds: Normal breath sounds. No wheezing.   Abdominal:      General: Bowel sounds are normal.      Palpations: Abdomen is soft.      Tenderness: There is no abdominal tenderness.   Musculoskeletal: Normal range of motion.         General: No tenderness.      Comments: Patient ambulated slowly with a limp and uses a cane   Skin:     General: Skin is warm and dry.      Findings: No rash.   Neurological:      Mental Status: He is alert and oriented to person, place, and time.   Psychiatric:         Behavior: Behavior normal.          Assessment:       1. Chronic low back pain, unspecified back pain laterality, unspecified whether sciatica present     2. Essential hypertension     3. S/P lumbar spine operation          Plan:       Chronic low back pain, unspecified back pain laterality, unspecified whether sciatica present  - continue pain management    Essential hypertension  - continue current     S/P lumbar spine operation  - continue pain management

## 2020-11-25 ENCOUNTER — TELEPHONE (OUTPATIENT)
Dept: NEUROSURGERY | Facility: CLINIC | Age: 70
End: 2020-11-25

## 2020-11-25 DIAGNOSIS — M54.42 CHRONIC BILATERAL LOW BACK PAIN WITH BILATERAL SCIATICA: ICD-10-CM

## 2020-11-25 DIAGNOSIS — M54.41 CHRONIC BILATERAL LOW BACK PAIN WITH BILATERAL SCIATICA: ICD-10-CM

## 2020-11-25 DIAGNOSIS — G89.29 CHRONIC BILATERAL LOW BACK PAIN WITH BILATERAL SCIATICA: ICD-10-CM

## 2020-11-25 RX ORDER — HYDROCODONE BITARTRATE AND ACETAMINOPHEN 5; 325 MG/1; MG/1
1 TABLET ORAL EVERY 8 HOURS PRN
Qty: 21 TABLET | Refills: 0 | Status: SHIPPED | OUTPATIENT
Start: 2020-11-25 | End: 2021-01-28 | Stop reason: SDUPTHER

## 2020-11-25 NOTE — PROGRESS NOTES
"REFERRING PHYSICIAN:    No ref. provider found  N/A        CLINICAL PROGRESS:   Alexis Medrano Jr. is now  5 mo s/p thoracic dec , fusion for severe progression myelopathy   Was non ambulatory with no antigravity fx in hospital    No has b/b fnx  And ambulates ; however spastic with imbalance    MEDICATIONS:                   List reviewed, see chart for dosing details.    ALLERGIES:       Review of patient's allergies indicates:  No Known Allergies    PHYSICAL EXAMINATION:          VITAL SIGNS:   BP (!) 197/98   Pulse 73   Ht 5' 4" (1.626 m)   Wt 83.9 kg (185 lb)   BMI 31.76 kg/m²     GENERAL:  Patient is well developed, well nourished, calm, collected, and in no apparent distress.  Incision is well healed    NEUROLOGICAL:    LE strength                     R          L     IP           5           5     Quad      5           5     TA          5           5      EHL      5           5      GS        5           5  3+ , brisk + bab    .  Sensation is intact to light touch/PP.  Gait is wide based and unsteady    IMAGING DATA:  Stable alignment, intact hardware without subsidence      No flowsheet data found.      ASSESSMENT/PLAN:  S/p thoracic myelopathy   Made impressive recovery, ambulates, drives   Significant spastic gait  rec PT eval but not sure if hes willing  xrays look good  Reviewed ASD, when to seek re-eval  All question answered      Advised to call the office Iif any questions or concerns arise.    This note was partially dictated using voice recognition software, so please excuse any errors that were not corrected.      "

## 2020-11-25 NOTE — TELEPHONE ENCOUNTER
erx for short supply of hydrocodone sent. Dr. david has prescribed this for him before. He should make an appointment with her to discuss chronic pain management if he is no longer going to see pain management.

## 2020-11-25 NOTE — TELEPHONE ENCOUNTER
----- Message from Rah Myers sent at 11/24/2020  2:32 PM CST -----  Type: Needs Medical Advice  Who Called:  patient  Symptoms (please be specific):    How long has patient had these symptoms:    Pharmacy name and phone #:    Best Call Back Number: [phone]  Additional Information: requesting a call back regarding past surgery

## 2020-11-25 NOTE — TELEPHONE ENCOUNTER
Spoke with patient. Patient reports that the message was supposed to be sent to Dr De Jesus office. States that he is in pain and would like a prescription for pain medication. I asked patient if he would like the message sent to Dr Hong's office. Patient states that he does not want to see Dr Hong anymore.

## 2020-12-03 ENCOUNTER — TELEPHONE (OUTPATIENT)
Dept: FAMILY MEDICINE | Facility: CLINIC | Age: 70
End: 2020-12-03

## 2020-12-03 NOTE — TELEPHONE ENCOUNTER
----- Message from Gerri Bowen sent at 12/2/2020  3:21 PM CST -----  Contact: call  pt  [phone]    Type:  RX Refill Request    Who Called:  pt  Refill RX Name and Strength:    pt   is asking  for a  new   blood  pressure   med   //   old  blood  pressure  meds  not  working //    and  hydrocodone  10 / 325 mg  Preferred Pharmacy with phone number:    [id number] - [place] - [address] [place] [address]  Phone: [phone] Fax: [phone]  Best Call Back Number:  call  pt  [phone]  Additional Information:    please call  to  speak to  pt   for all details

## 2020-12-04 ENCOUNTER — TELEPHONE (OUTPATIENT)
Dept: FAMILY MEDICINE | Facility: CLINIC | Age: 70
End: 2020-12-04

## 2020-12-04 NOTE — TELEPHONE ENCOUNTER
Neither numbers provided for patient are working numbers, The 516 number is for a different patient.

## 2020-12-04 NOTE — TELEPHONE ENCOUNTER
----- Message from Gerri Bowen sent at 12/2/2020  3:21 PM CST -----  Contact: call  pt  [phone]    Type:  RX Refill Request    Who Called:  pt  Refill RX Name and Strength:    pt   is asking  for a  new   blood  pressure   med   //   old  blood  pressure  meds  not  working //    and  hydrocodone  10 / 325 mg  Preferred Pharmacy with phone number:    [facility][id number] - [place] - [address] [place] [address]  Phone: [phone] Fax: [phone]  Best Call Back Number:  call  pt  [phone]  Additional Information:    please call  to  speak to  pt   for all details

## 2020-12-04 NOTE — TELEPHONE ENCOUNTER
----- Message from Mila Boyd sent at 12/4/2020  9:00 AM CST -----  Type: Needs Medical Advice    Who Called:  Patient  Best Call Back Number: [phone]  Additional Information:  Patient left previous message requesting to speak with nurse or doctor concerning his fluctuating blood pressure/has not heard from anyone/please call patient back at above number.

## 2020-12-04 NOTE — TELEPHONE ENCOUNTER
Spoke with patient. His Bp is high. Atenolol 100 mg and telmisartan 80 mg daily. BP today  185/100. Stated he gets headaches and dizziness when his BP is high, but he has no symptoms at the moment. Instructed to go to ED or UC if his symptoms worsen. Verbalized understanding. There are no appointments available today in clinic. Please advise. PCP is out.  patient is requesting his medications to be changed.

## 2020-12-04 NOTE — TELEPHONE ENCOUNTER
The patient needs a 1-2 week appointment to follow up for his blood pressure.  He can take an additional tablet of his temisartan HCTZ if his blood pressure is above 170/100.  If he develops headache or blurred vision over the weekened he should go to the ER

## 2020-12-08 ENCOUNTER — TELEPHONE (OUTPATIENT)
Dept: NEUROSURGERY | Facility: CLINIC | Age: 70
End: 2020-12-08

## 2020-12-08 NOTE — TELEPHONE ENCOUNTER
----- Message from Skye Harman PA-C sent at 12/7/2020 12:41 PM CST -----  Regarding: RE: SIXTO  He needs to follow up with his PCP. I will not be filling pain medication this far out from surgery.   ----- Message -----  From: Akiko Peter MA  Sent: 12/7/2020  12:24 PM CST  To: Skye Harman PA-C  Subject: SIXTO                                              Pt is calling requesting a refill for Hydrocodone. [facility] in [place]. [phone]  Dr Connell did not prescribe Hydrocodone for him. I made the pt aware he needed to call the dr who filled it last and talk to them.

## 2020-12-24 ENCOUNTER — EXTERNAL HOME HEALTH (OUTPATIENT)
Dept: HOME HEALTH SERVICES | Facility: HOSPITAL | Age: 70
End: 2020-12-24
Payer: MEDICARE

## 2020-12-28 ENCOUNTER — TELEPHONE (OUTPATIENT)
Dept: FAMILY MEDICINE | Facility: CLINIC | Age: 70
End: 2020-12-28

## 2020-12-28 NOTE — TELEPHONE ENCOUNTER
----- Message from Jose Angel Taylor sent at 12/28/2020  2:19 PM CST -----  Contact: pt  Type: Needs Medical Advice  Who Called:  pt  Symptoms (please be specific):  posterior leg pain  How long has patient had these symptoms:  since may  Pharmacy name and phone #:    [facility] #[id number] - [place], [place] - [address] [facility] [address]  [address]  [place] [address]  Phone: [phone] Fax: [phone]    Best Call Back Number: [phone]  Additional Information: Please call to advise

## 2020-12-28 NOTE — TELEPHONE ENCOUNTER
Spoke to patient, he stated he is having leg pain.He is changing insurance so he did not want to schedule appointment until January.  He is requesting Refill for Diazepam.He is also requesting refill for hydrocodone,He stated he had a refill two weeks ago but was only refilled 20 tablets. I did not see any prescription for Hydrocodone. Please advise.

## 2020-12-28 NOTE — TELEPHONE ENCOUNTER
I cannot, is a controlled substance scheduled to, will need to see the patient to prescribe it.  Thank you

## 2021-01-04 ENCOUNTER — TELEPHONE (OUTPATIENT)
Dept: FAMILY MEDICINE | Facility: CLINIC | Age: 71
End: 2021-01-04

## 2021-01-08 ENCOUNTER — TELEPHONE (OUTPATIENT)
Dept: FAMILY MEDICINE | Facility: CLINIC | Age: 71
End: 2021-01-08

## 2021-01-15 ENCOUNTER — PATIENT OUTREACH (OUTPATIENT)
Dept: ADMINISTRATIVE | Facility: HOSPITAL | Age: 71
End: 2021-01-15

## 2021-01-22 DIAGNOSIS — I25.10 CORONARY ARTERY DISEASE INVOLVING NATIVE CORONARY ARTERY OF NATIVE HEART WITHOUT ANGINA PECTORIS: Primary | ICD-10-CM

## 2021-01-22 RX ORDER — ROSUVASTATIN CALCIUM 20 MG/1
TABLET, COATED ORAL
Qty: 90 TABLET | Refills: 1 | Status: CANCELLED | OUTPATIENT
Start: 2021-01-22

## 2021-01-28 DIAGNOSIS — M54.42 CHRONIC BILATERAL LOW BACK PAIN WITH BILATERAL SCIATICA: ICD-10-CM

## 2021-01-28 DIAGNOSIS — M54.41 CHRONIC BILATERAL LOW BACK PAIN WITH BILATERAL SCIATICA: ICD-10-CM

## 2021-01-28 DIAGNOSIS — G89.29 CHRONIC BILATERAL LOW BACK PAIN WITH BILATERAL SCIATICA: ICD-10-CM

## 2021-01-28 RX ORDER — HYDROCODONE BITARTRATE AND ACETAMINOPHEN 5; 325 MG/1; MG/1
1 TABLET ORAL EVERY 8 HOURS PRN
Qty: 21 TABLET | Refills: 0 | Status: SHIPPED | OUTPATIENT
Start: 2021-01-28 | End: 2021-02-04

## 2021-02-03 ENCOUNTER — TELEPHONE (OUTPATIENT)
Dept: FAMILY MEDICINE | Facility: CLINIC | Age: 71
End: 2021-02-03

## 2021-02-05 RX ORDER — SILDENAFIL 100 MG/1
100 TABLET, FILM COATED ORAL DAILY PRN
Qty: 6 TABLET | Refills: 11 | OUTPATIENT
Start: 2021-02-05 | End: 2021-04-29 | Stop reason: SDUPTHER

## 2021-02-10 ENCOUNTER — TELEPHONE (OUTPATIENT)
Dept: FAMILY MEDICINE | Facility: CLINIC | Age: 71
End: 2021-02-10

## 2021-02-13 ENCOUNTER — TELEPHONE (OUTPATIENT)
Dept: FAMILY MEDICINE | Facility: CLINIC | Age: 71
End: 2021-02-13

## 2021-02-14 ENCOUNTER — TELEPHONE (OUTPATIENT)
Dept: FAMILY MEDICINE | Facility: CLINIC | Age: 71
End: 2021-02-14

## 2021-02-26 ENCOUNTER — OFFICE VISIT (OUTPATIENT)
Dept: FAMILY MEDICINE | Facility: CLINIC | Age: 71
End: 2021-02-26
Payer: MEDICARE

## 2021-02-26 VITALS
DIASTOLIC BLOOD PRESSURE: 72 MMHG | SYSTOLIC BLOOD PRESSURE: 130 MMHG | WEIGHT: 188.69 LBS | HEART RATE: 78 BPM | HEIGHT: 64 IN | BODY MASS INDEX: 32.21 KG/M2

## 2021-02-26 DIAGNOSIS — M54.41 CHRONIC BILATERAL LOW BACK PAIN WITH RIGHT-SIDED SCIATICA: Primary | ICD-10-CM

## 2021-02-26 DIAGNOSIS — G89.29 CHRONIC BILATERAL LOW BACK PAIN WITH RIGHT-SIDED SCIATICA: Primary | ICD-10-CM

## 2021-02-26 DIAGNOSIS — F17.200 SMOKING: ICD-10-CM

## 2021-02-26 DIAGNOSIS — Z98.890 S/P LUMBAR SPINE OPERATION: ICD-10-CM

## 2021-02-26 DIAGNOSIS — F41.9 ANXIETY: ICD-10-CM

## 2021-02-26 DIAGNOSIS — G47.00 INSOMNIA, UNSPECIFIED TYPE: ICD-10-CM

## 2021-02-26 DIAGNOSIS — E61.1 IRON DEFICIENCY: ICD-10-CM

## 2021-02-26 PROCEDURE — 3075F SYST BP GE 130 - 139MM HG: CPT | Mod: CPTII,S$GLB,, | Performed by: PHYSICIAN ASSISTANT

## 2021-02-26 PROCEDURE — 1125F AMNT PAIN NOTED PAIN PRSNT: CPT | Mod: S$GLB,,, | Performed by: PHYSICIAN ASSISTANT

## 2021-02-26 PROCEDURE — 1101F PR PT FALLS ASSESS DOC 0-1 FALLS W/OUT INJ PAST YR: ICD-10-PCS | Mod: CPTII,S$GLB,, | Performed by: PHYSICIAN ASSISTANT

## 2021-02-26 PROCEDURE — 99999 PR PBB SHADOW E&M-EST. PATIENT-LVL V: ICD-10-PCS | Mod: PBBFAC,,, | Performed by: PHYSICIAN ASSISTANT

## 2021-02-26 PROCEDURE — 3288F PR FALLS RISK ASSESSMENT DOCUMENTED: ICD-10-PCS | Mod: CPTII,S$GLB,, | Performed by: PHYSICIAN ASSISTANT

## 2021-02-26 PROCEDURE — 1159F PR MEDICATION LIST DOCUMENTED IN MEDICAL RECORD: ICD-10-PCS | Mod: S$GLB,,, | Performed by: PHYSICIAN ASSISTANT

## 2021-02-26 PROCEDURE — 3078F DIAST BP <80 MM HG: CPT | Mod: CPTII,S$GLB,, | Performed by: PHYSICIAN ASSISTANT

## 2021-02-26 PROCEDURE — 3075F PR MOST RECENT SYSTOLIC BLOOD PRESS GE 130-139MM HG: ICD-10-PCS | Mod: CPTII,S$GLB,, | Performed by: PHYSICIAN ASSISTANT

## 2021-02-26 PROCEDURE — 3288F FALL RISK ASSESSMENT DOCD: CPT | Mod: CPTII,S$GLB,, | Performed by: PHYSICIAN ASSISTANT

## 2021-02-26 PROCEDURE — 1101F PT FALLS ASSESS-DOCD LE1/YR: CPT | Mod: CPTII,S$GLB,, | Performed by: PHYSICIAN ASSISTANT

## 2021-02-26 PROCEDURE — 99214 OFFICE O/P EST MOD 30 MIN: CPT | Mod: S$GLB,,, | Performed by: PHYSICIAN ASSISTANT

## 2021-02-26 PROCEDURE — 99214 PR OFFICE/OUTPT VISIT, EST, LEVL IV, 30-39 MIN: ICD-10-PCS | Mod: S$GLB,,, | Performed by: PHYSICIAN ASSISTANT

## 2021-02-26 PROCEDURE — 1159F MED LIST DOCD IN RCRD: CPT | Mod: S$GLB,,, | Performed by: PHYSICIAN ASSISTANT

## 2021-02-26 PROCEDURE — 3008F PR BODY MASS INDEX (BMI) DOCUMENTED: ICD-10-PCS | Mod: CPTII,S$GLB,, | Performed by: PHYSICIAN ASSISTANT

## 2021-02-26 PROCEDURE — 99999 PR PBB SHADOW E&M-EST. PATIENT-LVL V: CPT | Mod: PBBFAC,,, | Performed by: PHYSICIAN ASSISTANT

## 2021-02-26 PROCEDURE — 3078F PR MOST RECENT DIASTOLIC BLOOD PRESSURE < 80 MM HG: ICD-10-PCS | Mod: CPTII,S$GLB,, | Performed by: PHYSICIAN ASSISTANT

## 2021-02-26 PROCEDURE — 3008F BODY MASS INDEX DOCD: CPT | Mod: CPTII,S$GLB,, | Performed by: PHYSICIAN ASSISTANT

## 2021-02-26 PROCEDURE — 1125F PR PAIN SEVERITY QUANTIFIED, PAIN PRESENT: ICD-10-PCS | Mod: S$GLB,,, | Performed by: PHYSICIAN ASSISTANT

## 2021-02-26 RX ORDER — FERROUS SULFATE 325(65) MG
1 TABLET ORAL DAILY
Qty: 90 TABLET | Refills: 3 | Status: SHIPPED | OUTPATIENT
Start: 2021-02-26 | End: 2021-10-13 | Stop reason: SDUPTHER

## 2021-03-01 RX ORDER — DIAZEPAM 5 MG/1
5 TABLET ORAL NIGHTLY PRN
Qty: 30 TABLET | Refills: 0 | Status: SHIPPED | OUTPATIENT
Start: 2021-03-01 | End: 2021-04-12 | Stop reason: SDUPTHER

## 2021-03-04 DIAGNOSIS — E78.00 HYPERCHOLESTEROLEMIA: Primary | ICD-10-CM

## 2021-03-04 RX ORDER — ROSUVASTATIN CALCIUM 20 MG/1
TABLET, COATED ORAL
Qty: 90 TABLET | Refills: 0 | Status: SHIPPED | OUTPATIENT
Start: 2021-03-04 | End: 2021-04-14

## 2021-03-12 ENCOUNTER — TELEPHONE (OUTPATIENT)
Dept: FAMILY MEDICINE | Facility: CLINIC | Age: 71
End: 2021-03-12

## 2021-03-16 ENCOUNTER — TELEPHONE (OUTPATIENT)
Dept: FAMILY MEDICINE | Facility: CLINIC | Age: 71
End: 2021-03-16

## 2021-04-12 DIAGNOSIS — F41.9 ANXIETY: ICD-10-CM

## 2021-04-12 DIAGNOSIS — Z98.890 S/P LUMBAR SPINE OPERATION: ICD-10-CM

## 2021-04-12 RX ORDER — DIAZEPAM 5 MG/1
5 TABLET ORAL NIGHTLY PRN
Qty: 15 TABLET | Refills: 0 | Status: SHIPPED | OUTPATIENT
Start: 2021-04-12 | End: 2021-09-29 | Stop reason: SDUPTHER

## 2021-04-14 DIAGNOSIS — F41.9 ANXIETY: ICD-10-CM

## 2021-04-15 RX ORDER — ESCITALOPRAM OXALATE 20 MG/1
TABLET ORAL
Qty: 90 TABLET | Refills: 1 | Status: SHIPPED | OUTPATIENT
Start: 2021-04-15 | End: 2021-09-29 | Stop reason: SDUPTHER

## 2021-04-29 RX ORDER — SILDENAFIL 100 MG/1
100 TABLET, FILM COATED ORAL DAILY PRN
Qty: 6 TABLET | Refills: 0 | Status: SHIPPED | OUTPATIENT
Start: 2021-04-29 | End: 2021-05-27 | Stop reason: SDUPTHER

## 2021-05-05 DIAGNOSIS — F41.9 ANXIETY: ICD-10-CM

## 2021-05-05 DIAGNOSIS — Z98.890 S/P LUMBAR SPINE OPERATION: ICD-10-CM

## 2021-05-05 RX ORDER — DIAZEPAM 5 MG/1
5 TABLET ORAL NIGHTLY PRN
Qty: 15 TABLET | Refills: 0 | OUTPATIENT
Start: 2021-05-05

## 2021-05-25 ENCOUNTER — TELEPHONE (OUTPATIENT)
Dept: FAMILY MEDICINE | Facility: CLINIC | Age: 71
End: 2021-05-25

## 2021-05-27 RX ORDER — SILDENAFIL 100 MG/1
100 TABLET, FILM COATED ORAL DAILY PRN
Qty: 6 TABLET | Refills: 0 | Status: SHIPPED | OUTPATIENT
Start: 2021-05-27 | End: 2021-09-29

## 2021-06-10 ENCOUNTER — PATIENT OUTREACH (OUTPATIENT)
Dept: ADMINISTRATIVE | Facility: OTHER | Age: 71
End: 2021-06-10

## 2021-06-19 NOTE — PROGRESS NOTES
Established Patient - Audio Only Telehealth Visit     The patient location is:  [place]  The chief complaint leading to consultation is:  Back pain  Visit type: Virtual visit with audio only (telephone)  Total time spent with patient:  15 min       The reason for the audio only service rather than synchronous audio and video virtual visit was related to technical difficulties or patient preference/necessity.     Each patient to whom I provide medical services by telemedicine is:  (1) informed of the relationship between the physician and patient and the respective role of any other health care provider with respect to management of the patient; and (2) notified that they may decline to receive medical services by telemedicine and may withdraw from such care at any time. Patient verbally consented to receive this service via voice-only telephone call.       HPI:  The patient is complaining of pain on the lower back, pain radiated to the lower extremities and weakness on the lower extremities.  The patient was hospitalized and has surgery for his lower back secondary to paraplegia, the patient states that he is doing better but was not able to follow-up with the specialist and has an appointment next week.  She has been doing rehab at home with good relief of the symptoms but the patient stated that his legs swell up, he was discontinue furosemide but he started to taking the back again and the symptoms improved.  The patient was prescribed oxycodone tablets and also diazepam.  He is asking if medication for pain can be call to the pharmacy.    Upon review of the last blood work, the patient has anemia and still taking iron tablets.    Past medical history, past social history was reviewed and discussed with the patient.     Assessment and plan:       Chronic bilateral low back pain with bilateral sciatica:  Improved  -     HYDROcodone-acetaminophen (NORCO) 5-325 mg per tablet; Take 1 tablet by mouth every 8 (eight)  SCHEDULED WITH [facility] FOR Friday 7/13 WITH DR BOUDREAUX   hours as needed for Pain.  Dispense: 21 tablet; Refill: 0    Localized edema:  Improved    Other iron deficiency anemia:  Stable    Weakness:  Improved    [place] prescription monitoring program was checked and okay.  Hydrocodone was prescribed for 7 days until he is able to follow-up with the specialist.  I explained to the patient that I could not continue to prescribe pain medication chronically and he needs to follow with pain management as directed.  He will make an appointment for a follow-up so we can recheck his blood work and examine him.  This service was not originating from a related E/M service provided within the previous 7 days nor will  to an E/M service or procedure within the next 24 hours or my soonest available appointment.  Prevailing standard of care was able to be met in this audio-only visit.

## 2021-09-02 ENCOUNTER — TELEPHONE (OUTPATIENT)
Dept: FAMILY MEDICINE | Facility: CLINIC | Age: 71
End: 2021-09-02

## 2021-09-10 DIAGNOSIS — I10 ESSENTIAL HYPERTENSION: ICD-10-CM

## 2021-09-10 RX ORDER — ATENOLOL 100 MG/1
100 TABLET ORAL DAILY
Qty: 90 TABLET | Refills: 0 | Status: SHIPPED | OUTPATIENT
Start: 2021-09-10 | End: 2021-09-29 | Stop reason: SDUPTHER

## 2021-09-29 ENCOUNTER — LAB VISIT (OUTPATIENT)
Dept: LAB | Facility: HOSPITAL | Age: 71
End: 2021-09-29
Attending: FAMILY MEDICINE
Payer: MEDICARE

## 2021-09-29 ENCOUNTER — OFFICE VISIT (OUTPATIENT)
Dept: FAMILY MEDICINE | Facility: CLINIC | Age: 71
End: 2021-09-29
Payer: MEDICARE

## 2021-09-29 ENCOUNTER — IMMUNIZATION (OUTPATIENT)
Dept: FAMILY MEDICINE | Facility: CLINIC | Age: 71
End: 2021-09-29
Payer: MEDICARE

## 2021-09-29 VITALS
HEART RATE: 66 BPM | BODY MASS INDEX: 31.14 KG/M2 | DIASTOLIC BLOOD PRESSURE: 94 MMHG | OXYGEN SATURATION: 96 % | WEIGHT: 181.44 LBS | SYSTOLIC BLOOD PRESSURE: 158 MMHG

## 2021-09-29 DIAGNOSIS — F17.200 TOBACCO DEPENDENCE: ICD-10-CM

## 2021-09-29 DIAGNOSIS — I10 ESSENTIAL HYPERTENSION: Primary | ICD-10-CM

## 2021-09-29 DIAGNOSIS — E66.09 CLASS 1 OBESITY DUE TO EXCESS CALORIES WITH SERIOUS COMORBIDITY AND BODY MASS INDEX (BMI) OF 31.0 TO 31.9 IN ADULT: ICD-10-CM

## 2021-09-29 DIAGNOSIS — I10 ESSENTIAL HYPERTENSION: ICD-10-CM

## 2021-09-29 DIAGNOSIS — N52.8 OTHER MALE ERECTILE DYSFUNCTION: ICD-10-CM

## 2021-09-29 DIAGNOSIS — I25.708 CORONARY ARTERY DISEASE OF BYPASS GRAFT OF NATIVE HEART WITH STABLE ANGINA PECTORIS: ICD-10-CM

## 2021-09-29 DIAGNOSIS — Z12.11 SCREENING FOR COLON CANCER: ICD-10-CM

## 2021-09-29 DIAGNOSIS — J44.9 CHRONIC OBSTRUCTIVE PULMONARY DISEASE, UNSPECIFIED COPD TYPE: ICD-10-CM

## 2021-09-29 DIAGNOSIS — E78.00 HYPERCHOLESTEROLEMIA: ICD-10-CM

## 2021-09-29 DIAGNOSIS — Z98.890 S/P LUMBAR SPINE OPERATION: ICD-10-CM

## 2021-09-29 DIAGNOSIS — F41.9 ANXIETY: ICD-10-CM

## 2021-09-29 DIAGNOSIS — I73.9 PAD (PERIPHERAL ARTERY DISEASE): ICD-10-CM

## 2021-09-29 DIAGNOSIS — R60.0 LOCALIZED EDEMA: ICD-10-CM

## 2021-09-29 DIAGNOSIS — Z23 NEED FOR VACCINATION: Primary | ICD-10-CM

## 2021-09-29 PROBLEM — G82.20 ACUTE PARAPLEGIA: Status: RESOLVED | Noted: 2020-05-29 | Resolved: 2021-09-29

## 2021-09-29 PROCEDURE — 90694 VACC AIIV4 NO PRSRV 0.5ML IM: CPT | Mod: S$GLB,,, | Performed by: FAMILY MEDICINE

## 2021-09-29 PROCEDURE — G0008 FLU VACCINE - QUADRIVALENT - ADJUVANTED: ICD-10-PCS | Mod: S$GLB,,, | Performed by: FAMILY MEDICINE

## 2021-09-29 PROCEDURE — 90694 FLU VACCINE - QUADRIVALENT - ADJUVANTED: ICD-10-PCS | Mod: S$GLB,,, | Performed by: FAMILY MEDICINE

## 2021-09-29 PROCEDURE — 3077F SYST BP >= 140 MM HG: CPT | Mod: CPTII,S$GLB,, | Performed by: FAMILY MEDICINE

## 2021-09-29 PROCEDURE — 91300 COVID-19, MRNA, LNP-S, PF, 30 MCG/0.3 ML DOSE VACCINE: CPT | Mod: PBBFAC | Performed by: FAMILY MEDICINE

## 2021-09-29 PROCEDURE — 3008F BODY MASS INDEX DOCD: CPT | Mod: CPTII,S$GLB,, | Performed by: FAMILY MEDICINE

## 2021-09-29 PROCEDURE — 3080F PR MOST RECENT DIASTOLIC BLOOD PRESSURE >= 90 MM HG: ICD-10-PCS | Mod: CPTII,S$GLB,, | Performed by: FAMILY MEDICINE

## 2021-09-29 PROCEDURE — 99999 PR PBB SHADOW E&M-EST. PATIENT-LVL III: ICD-10-PCS | Mod: PBBFAC,,, | Performed by: FAMILY MEDICINE

## 2021-09-29 PROCEDURE — 1125F AMNT PAIN NOTED PAIN PRSNT: CPT | Mod: CPTII,S$GLB,, | Performed by: FAMILY MEDICINE

## 2021-09-29 PROCEDURE — 99214 PR OFFICE/OUTPT VISIT, EST, LEVL IV, 30-39 MIN: ICD-10-PCS | Mod: 25,S$GLB,, | Performed by: FAMILY MEDICINE

## 2021-09-29 PROCEDURE — 3008F PR BODY MASS INDEX (BMI) DOCUMENTED: ICD-10-PCS | Mod: CPTII,S$GLB,, | Performed by: FAMILY MEDICINE

## 2021-09-29 PROCEDURE — 99214 OFFICE O/P EST MOD 30 MIN: CPT | Mod: 25,S$GLB,, | Performed by: FAMILY MEDICINE

## 2021-09-29 PROCEDURE — 3080F DIAST BP >= 90 MM HG: CPT | Mod: CPTII,S$GLB,, | Performed by: FAMILY MEDICINE

## 2021-09-29 PROCEDURE — 3077F PR MOST RECENT SYSTOLIC BLOOD PRESSURE >= 140 MM HG: ICD-10-PCS | Mod: CPTII,S$GLB,, | Performed by: FAMILY MEDICINE

## 2021-09-29 PROCEDURE — 0003A COVID-19, MRNA, LNP-S, PF, 30 MCG/0.3 ML DOSE VACCINE: CPT | Mod: PBBFAC | Performed by: FAMILY MEDICINE

## 2021-09-29 PROCEDURE — 99999 PR PBB SHADOW E&M-EST. PATIENT-LVL III: CPT | Mod: PBBFAC,,, | Performed by: FAMILY MEDICINE

## 2021-09-29 PROCEDURE — G0008 ADMIN INFLUENZA VIRUS VAC: HCPCS | Mod: S$GLB,,, | Performed by: FAMILY MEDICINE

## 2021-09-29 PROCEDURE — 1125F PR PAIN SEVERITY QUANTIFIED, PAIN PRESENT: ICD-10-PCS | Mod: CPTII,S$GLB,, | Performed by: FAMILY MEDICINE

## 2021-09-29 RX ORDER — ALBUTEROL SULFATE 90 UG/1
2 AEROSOL, METERED RESPIRATORY (INHALATION) EVERY 6 HOURS PRN
Qty: 18 G | Refills: 0 | Status: SHIPPED | OUTPATIENT
Start: 2021-09-29 | End: 2021-09-30

## 2021-09-29 RX ORDER — FUROSEMIDE 20 MG/1
20 TABLET ORAL DAILY
Qty: 90 TABLET | Refills: 3 | Status: SHIPPED | OUTPATIENT
Start: 2021-09-29 | End: 2021-10-13 | Stop reason: SDUPTHER

## 2021-09-29 RX ORDER — POTASSIUM CHLORIDE 20 MEQ/1
20 TABLET, EXTENDED RELEASE ORAL DAILY
Qty: 90 TABLET | Refills: 3 | Status: SHIPPED | OUTPATIENT
Start: 2021-09-29 | End: 2021-10-13 | Stop reason: SDUPTHER

## 2021-09-29 RX ORDER — ROSUVASTATIN CALCIUM 20 MG/1
20 TABLET, COATED ORAL DAILY
Qty: 90 TABLET | Refills: 3 | Status: SHIPPED | OUTPATIENT
Start: 2021-09-29 | End: 2021-10-13 | Stop reason: SDUPTHER

## 2021-09-29 RX ORDER — ESCITALOPRAM OXALATE 20 MG/1
20 TABLET ORAL NIGHTLY
Qty: 90 TABLET | Refills: 3 | Status: SHIPPED | OUTPATIENT
Start: 2021-09-29 | End: 2021-10-13 | Stop reason: SDUPTHER

## 2021-09-29 RX ORDER — TADALAFIL 10 MG/1
10 TABLET ORAL DAILY PRN
Qty: 30 TABLET | Refills: 5 | Status: SHIPPED | OUTPATIENT
Start: 2021-09-29 | End: 2022-01-24 | Stop reason: SDUPTHER

## 2021-09-29 RX ORDER — ATENOLOL 100 MG/1
100 TABLET ORAL DAILY
Qty: 90 TABLET | Refills: 3 | Status: SHIPPED | OUTPATIENT
Start: 2021-09-29 | End: 2021-10-13 | Stop reason: SDUPTHER

## 2021-09-29 RX ORDER — MONTELUKAST SODIUM 10 MG/1
TABLET ORAL
Qty: 90 TABLET | Refills: 3 | Status: SHIPPED | OUTPATIENT
Start: 2021-09-29 | End: 2021-10-13 | Stop reason: SDUPTHER

## 2021-09-29 RX ORDER — DIAZEPAM 5 MG/1
5 TABLET ORAL NIGHTLY PRN
Qty: 15 TABLET | Refills: 2 | Status: SHIPPED | OUTPATIENT
Start: 2021-09-29 | End: 2021-11-24 | Stop reason: SDUPTHER

## 2021-09-29 RX ORDER — TELMISARTAN AND HYDROCHLORTHIAZIDE 80; 12.5 MG/1; MG/1
1 TABLET ORAL DAILY
Qty: 90 TABLET | Refills: 3 | Status: SHIPPED | OUTPATIENT
Start: 2021-09-29 | End: 2021-10-13 | Stop reason: SDUPTHER

## 2021-09-29 RX ORDER — FINASTERIDE 5 MG/1
5 TABLET, FILM COATED ORAL DAILY
Qty: 90 TABLET | Refills: 3 | Status: SHIPPED | OUTPATIENT
Start: 2021-09-29 | End: 2021-10-13 | Stop reason: SDUPTHER

## 2021-09-29 RX ORDER — SILDENAFIL 100 MG/1
100 TABLET, FILM COATED ORAL DAILY PRN
Qty: 6 TABLET | Refills: 11 | Status: SHIPPED | OUTPATIENT
Start: 2021-09-29 | End: 2022-02-17 | Stop reason: ALTCHOICE

## 2021-09-30 RX ORDER — ALBUTEROL SULFATE 90 UG/1
AEROSOL, METERED RESPIRATORY (INHALATION)
Qty: 54 G | Refills: 3 | Status: SHIPPED | OUTPATIENT
Start: 2021-09-30 | End: 2022-08-12

## 2021-10-01 ENCOUNTER — TELEPHONE (OUTPATIENT)
Dept: GASTROENTEROLOGY | Facility: CLINIC | Age: 71
End: 2021-10-01

## 2021-10-01 DIAGNOSIS — Z01.818 PRE-OP TESTING: ICD-10-CM

## 2021-10-13 ENCOUNTER — CLINICAL SUPPORT (OUTPATIENT)
Dept: SMOKING CESSATION | Facility: CLINIC | Age: 71
End: 2021-10-13
Payer: COMMERCIAL

## 2021-10-13 ENCOUNTER — OFFICE VISIT (OUTPATIENT)
Dept: FAMILY MEDICINE | Facility: CLINIC | Age: 71
End: 2021-10-13
Payer: MEDICARE

## 2021-10-13 VITALS
HEART RATE: 70 BPM | DIASTOLIC BLOOD PRESSURE: 80 MMHG | OXYGEN SATURATION: 97 % | BODY MASS INDEX: 33.05 KG/M2 | WEIGHT: 193.56 LBS | HEIGHT: 64 IN | SYSTOLIC BLOOD PRESSURE: 130 MMHG

## 2021-10-13 DIAGNOSIS — F17.200 TOBACCO USE DISORDER: Primary | ICD-10-CM

## 2021-10-13 DIAGNOSIS — I10 ESSENTIAL HYPERTENSION: ICD-10-CM

## 2021-10-13 DIAGNOSIS — J44.9 CHRONIC OBSTRUCTIVE PULMONARY DISEASE, UNSPECIFIED COPD TYPE: ICD-10-CM

## 2021-10-13 DIAGNOSIS — R60.0 LOCALIZED EDEMA: ICD-10-CM

## 2021-10-13 DIAGNOSIS — F41.9 ANXIETY: ICD-10-CM

## 2021-10-13 DIAGNOSIS — E61.1 IRON DEFICIENCY: ICD-10-CM

## 2021-10-13 DIAGNOSIS — R09.89 RUNNY NOSE: ICD-10-CM

## 2021-10-13 DIAGNOSIS — E78.00 HYPERCHOLESTEROLEMIA: ICD-10-CM

## 2021-10-13 PROCEDURE — 3008F PR BODY MASS INDEX (BMI) DOCUMENTED: ICD-10-PCS | Mod: CPTII,S$GLB,, | Performed by: PHYSICIAN ASSISTANT

## 2021-10-13 PROCEDURE — 99999 PR PBB SHADOW E&M-EST. PATIENT-LVL III: CPT | Mod: PBBFAC,,,

## 2021-10-13 PROCEDURE — 99214 OFFICE O/P EST MOD 30 MIN: CPT | Mod: S$GLB,,, | Performed by: PHYSICIAN ASSISTANT

## 2021-10-13 PROCEDURE — 3008F BODY MASS INDEX DOCD: CPT | Mod: CPTII,S$GLB,, | Performed by: PHYSICIAN ASSISTANT

## 2021-10-13 PROCEDURE — 99999 PR PBB SHADOW E&M-EST. PATIENT-LVL III: ICD-10-PCS | Mod: PBBFAC,,,

## 2021-10-13 PROCEDURE — 3075F PR MOST RECENT SYSTOLIC BLOOD PRESS GE 130-139MM HG: ICD-10-PCS | Mod: CPTII,S$GLB,, | Performed by: PHYSICIAN ASSISTANT

## 2021-10-13 PROCEDURE — 1159F MED LIST DOCD IN RCRD: CPT | Mod: CPTII,S$GLB,, | Performed by: PHYSICIAN ASSISTANT

## 2021-10-13 PROCEDURE — 99214 PR OFFICE/OUTPT VISIT, EST, LEVL IV, 30-39 MIN: ICD-10-PCS | Mod: S$GLB,,, | Performed by: PHYSICIAN ASSISTANT

## 2021-10-13 PROCEDURE — 99404 PREV MED CNSL INDIV APPRX 60: CPT | Mod: S$GLB,,, | Performed by: GENERAL PRACTICE

## 2021-10-13 PROCEDURE — 99999 PR PBB SHADOW E&M-EST. PATIENT-LVL III: ICD-10-PCS | Mod: PBBFAC,,, | Performed by: PHYSICIAN ASSISTANT

## 2021-10-13 PROCEDURE — 99404 PR PREVENT COUNSEL,INDIV,60 MIN: ICD-10-PCS | Mod: S$GLB,,, | Performed by: GENERAL PRACTICE

## 2021-10-13 PROCEDURE — 3079F DIAST BP 80-89 MM HG: CPT | Mod: CPTII,S$GLB,, | Performed by: PHYSICIAN ASSISTANT

## 2021-10-13 PROCEDURE — 3079F PR MOST RECENT DIASTOLIC BLOOD PRESSURE 80-89 MM HG: ICD-10-PCS | Mod: CPTII,S$GLB,, | Performed by: PHYSICIAN ASSISTANT

## 2021-10-13 PROCEDURE — 1159F PR MEDICATION LIST DOCUMENTED IN MEDICAL RECORD: ICD-10-PCS | Mod: CPTII,S$GLB,, | Performed by: PHYSICIAN ASSISTANT

## 2021-10-13 PROCEDURE — 99999 PR PBB SHADOW E&M-EST. PATIENT-LVL III: CPT | Mod: PBBFAC,,, | Performed by: PHYSICIAN ASSISTANT

## 2021-10-13 PROCEDURE — 3075F SYST BP GE 130 - 139MM HG: CPT | Mod: CPTII,S$GLB,, | Performed by: PHYSICIAN ASSISTANT

## 2021-10-13 RX ORDER — ATENOLOL 100 MG/1
100 TABLET ORAL DAILY
Qty: 90 TABLET | Refills: 3 | Status: SHIPPED | OUTPATIENT
Start: 2021-10-13 | End: 2021-12-08

## 2021-10-13 RX ORDER — IBUPROFEN 200 MG
1 TABLET ORAL DAILY
Qty: 14 PATCH | Refills: 0 | Status: ON HOLD | OUTPATIENT
Start: 2021-10-13 | End: 2024-03-20 | Stop reason: HOSPADM

## 2021-10-13 RX ORDER — POTASSIUM CHLORIDE 20 MEQ/1
20 TABLET, EXTENDED RELEASE ORAL DAILY
Qty: 90 TABLET | Refills: 3 | Status: SHIPPED | OUTPATIENT
Start: 2021-10-13 | End: 2022-11-29 | Stop reason: SDUPTHER

## 2021-10-13 RX ORDER — ESCITALOPRAM OXALATE 20 MG/1
20 TABLET ORAL NIGHTLY
Qty: 90 TABLET | Refills: 3 | Status: SHIPPED | OUTPATIENT
Start: 2021-10-13 | End: 2022-06-28 | Stop reason: SDUPTHER

## 2021-10-13 RX ORDER — ROSUVASTATIN CALCIUM 20 MG/1
20 TABLET, COATED ORAL DAILY
Qty: 90 TABLET | Refills: 3 | Status: SHIPPED | OUTPATIENT
Start: 2021-10-13 | End: 2022-04-27 | Stop reason: DRUGHIGH

## 2021-10-13 RX ORDER — FERROUS SULFATE 325(65) MG
1 TABLET ORAL DAILY
Qty: 90 TABLET | Refills: 3 | Status: SHIPPED | OUTPATIENT
Start: 2021-10-13 | End: 2022-08-10 | Stop reason: SDUPTHER

## 2021-10-13 RX ORDER — ASPIRIN/CALCIUM CARB/MAGNESIUM 325 MG
TABLET ORAL
Qty: 72 LOZENGE | Refills: 0 | Status: ON HOLD | OUTPATIENT
Start: 2021-10-13 | End: 2024-03-20 | Stop reason: HOSPADM

## 2021-10-13 RX ORDER — FINASTERIDE 5 MG/1
5 TABLET, FILM COATED ORAL DAILY
Qty: 90 TABLET | Refills: 3 | Status: SHIPPED | OUTPATIENT
Start: 2021-10-13 | End: 2022-08-10 | Stop reason: SDUPTHER

## 2021-10-13 RX ORDER — MONTELUKAST SODIUM 10 MG/1
TABLET ORAL
Qty: 90 TABLET | Refills: 3 | Status: SHIPPED | OUTPATIENT
Start: 2021-10-13 | End: 2022-10-13

## 2021-10-13 RX ORDER — AZELASTINE 1 MG/ML
1 SPRAY, METERED NASAL 2 TIMES DAILY
Qty: 90 ML | Refills: 3 | Status: SHIPPED | OUTPATIENT
Start: 2021-10-13 | End: 2023-01-26 | Stop reason: SDUPTHER

## 2021-10-13 RX ORDER — FUROSEMIDE 20 MG/1
20 TABLET ORAL DAILY
Qty: 90 TABLET | Refills: 3 | Status: SHIPPED | OUTPATIENT
Start: 2021-10-13 | End: 2022-01-20 | Stop reason: SDUPTHER

## 2021-10-13 RX ORDER — TELMISARTAN AND HYDROCHLORTHIAZIDE 80; 12.5 MG/1; MG/1
1 TABLET ORAL DAILY
Qty: 90 TABLET | Refills: 3 | Status: SHIPPED | OUTPATIENT
Start: 2021-10-13 | End: 2022-05-02 | Stop reason: SDUPTHER

## 2021-10-27 ENCOUNTER — TELEPHONE (OUTPATIENT)
Dept: SMOKING CESSATION | Facility: CLINIC | Age: 71
End: 2021-10-27
Payer: MEDICARE

## 2021-11-01 ENCOUNTER — CLINICAL SUPPORT (OUTPATIENT)
Dept: SMOKING CESSATION | Facility: CLINIC | Age: 71
End: 2021-11-01
Payer: COMMERCIAL

## 2021-11-01 DIAGNOSIS — F17.200 TOBACCO USE DISORDER: Primary | ICD-10-CM

## 2021-11-01 PROCEDURE — 99407 BEHAV CHNG SMOKING > 10 MIN: CPT | Mod: S$GLB,,, | Performed by: GENERAL PRACTICE

## 2021-11-01 PROCEDURE — 99999 PR PBB SHADOW E&M-EST. PATIENT-LVL III: CPT | Mod: PBBFAC,,,

## 2021-11-01 PROCEDURE — 99999 PR PBB SHADOW E&M-EST. PATIENT-LVL III: ICD-10-PCS | Mod: PBBFAC,,,

## 2021-11-01 PROCEDURE — 99407 PR TOBACCO USE CESSATION INTENSIVE >10 MINUTES: ICD-10-PCS | Mod: S$GLB,,, | Performed by: GENERAL PRACTICE

## 2021-11-02 ENCOUNTER — TELEPHONE (OUTPATIENT)
Dept: FAMILY MEDICINE | Facility: CLINIC | Age: 71
End: 2021-11-02
Payer: MEDICARE

## 2021-11-15 ENCOUNTER — CLINICAL SUPPORT (OUTPATIENT)
Dept: SMOKING CESSATION | Facility: CLINIC | Age: 71
End: 2021-11-15
Payer: COMMERCIAL

## 2021-11-15 DIAGNOSIS — F17.200 TOBACCO USE DISORDER: Primary | ICD-10-CM

## 2021-11-15 PROCEDURE — 99407 PR TOBACCO USE CESSATION INTENSIVE >10 MINUTES: ICD-10-PCS | Mod: S$GLB,,, | Performed by: GENERAL PRACTICE

## 2021-11-15 PROCEDURE — 99999 PR PBB SHADOW E&M-EST. PATIENT-LVL III: ICD-10-PCS | Mod: PBBFAC,,,

## 2021-11-15 PROCEDURE — 99999 PR PBB SHADOW E&M-EST. PATIENT-LVL III: CPT | Mod: PBBFAC,,,

## 2021-11-15 PROCEDURE — 99407 BEHAV CHNG SMOKING > 10 MIN: CPT | Mod: S$GLB,,, | Performed by: GENERAL PRACTICE

## 2021-11-24 DIAGNOSIS — Z98.890 S/P LUMBAR SPINE OPERATION: ICD-10-CM

## 2021-11-24 DIAGNOSIS — F41.9 ANXIETY: ICD-10-CM

## 2021-11-24 RX ORDER — DIAZEPAM 5 MG/1
5 TABLET ORAL NIGHTLY PRN
Qty: 15 TABLET | Refills: 2 | Status: SHIPPED | OUTPATIENT
Start: 2021-11-24 | End: 2021-12-08 | Stop reason: SDUPTHER

## 2021-12-01 ENCOUNTER — TELEPHONE (OUTPATIENT)
Dept: FAMILY MEDICINE | Facility: CLINIC | Age: 71
End: 2021-12-01
Payer: MEDICARE

## 2021-12-07 ENCOUNTER — TELEPHONE (OUTPATIENT)
Dept: FAMILY MEDICINE | Facility: CLINIC | Age: 71
End: 2021-12-07
Payer: MEDICARE

## 2021-12-07 DIAGNOSIS — I10 ESSENTIAL HYPERTENSION: ICD-10-CM

## 2021-12-07 DIAGNOSIS — Z98.890 S/P LUMBAR SPINE OPERATION: ICD-10-CM

## 2021-12-07 DIAGNOSIS — F41.9 ANXIETY: ICD-10-CM

## 2021-12-08 ENCOUNTER — OFFICE VISIT (OUTPATIENT)
Dept: FAMILY MEDICINE | Facility: CLINIC | Age: 71
End: 2021-12-08
Payer: MEDICARE

## 2021-12-08 VITALS
WEIGHT: 187.5 LBS | OXYGEN SATURATION: 97 % | SYSTOLIC BLOOD PRESSURE: 132 MMHG | DIASTOLIC BLOOD PRESSURE: 76 MMHG | BODY MASS INDEX: 32.18 KG/M2 | TEMPERATURE: 98 F | HEART RATE: 79 BPM

## 2021-12-08 DIAGNOSIS — M54.50 CHRONIC BILATERAL LOW BACK PAIN WITHOUT SCIATICA: ICD-10-CM

## 2021-12-08 DIAGNOSIS — G89.29 CHRONIC BILATERAL LOW BACK PAIN WITHOUT SCIATICA: ICD-10-CM

## 2021-12-08 DIAGNOSIS — H93.13 TINNITUS OF BOTH EARS: ICD-10-CM

## 2021-12-08 DIAGNOSIS — E55.9 VITAMIN D DEFICIENCY: Primary | ICD-10-CM

## 2021-12-08 PROCEDURE — 99999 PR PBB SHADOW E&M-EST. PATIENT-LVL V: CPT | Mod: PBBFAC,,, | Performed by: PHYSICIAN ASSISTANT

## 2021-12-08 PROCEDURE — 99214 OFFICE O/P EST MOD 30 MIN: CPT | Mod: S$GLB,,, | Performed by: PHYSICIAN ASSISTANT

## 2021-12-08 PROCEDURE — 99214 PR OFFICE/OUTPT VISIT, EST, LEVL IV, 30-39 MIN: ICD-10-PCS | Mod: S$GLB,,, | Performed by: PHYSICIAN ASSISTANT

## 2021-12-08 PROCEDURE — 99999 PR PBB SHADOW E&M-EST. PATIENT-LVL V: ICD-10-PCS | Mod: PBBFAC,,, | Performed by: PHYSICIAN ASSISTANT

## 2021-12-08 RX ORDER — DIAZEPAM 5 MG/1
5 TABLET ORAL NIGHTLY PRN
Qty: 30 TABLET | Refills: 0 | Status: SHIPPED | OUTPATIENT
Start: 2021-12-08 | End: 2022-01-03 | Stop reason: SDUPTHER

## 2021-12-08 RX ORDER — MELOXICAM 7.5 MG/1
7.5 TABLET ORAL DAILY PRN
Qty: 20 TABLET | Refills: 1 | Status: SHIPPED | OUTPATIENT
Start: 2021-12-08 | End: 2022-02-17

## 2021-12-08 RX ORDER — ATENOLOL 100 MG/1
TABLET ORAL
Qty: 90 TABLET | Refills: 3 | Status: SHIPPED | OUTPATIENT
Start: 2021-12-08 | End: 2022-11-21 | Stop reason: SDUPTHER

## 2021-12-13 ENCOUNTER — TELEPHONE (OUTPATIENT)
Dept: FAMILY MEDICINE | Facility: CLINIC | Age: 71
End: 2021-12-13
Payer: MEDICARE

## 2021-12-13 DIAGNOSIS — R26.81 UNSTEADY GAIT: Primary | ICD-10-CM

## 2022-01-03 DIAGNOSIS — Z98.890 S/P LUMBAR SPINE OPERATION: ICD-10-CM

## 2022-01-03 DIAGNOSIS — F41.9 ANXIETY: ICD-10-CM

## 2022-01-03 NOTE — TELEPHONE ENCOUNTER
----- Message from Jaqueline Dave sent at 1/3/2022  8:55 AM CST -----  Regarding: Call back  Contact: [phone]  Type:  RX Refill Request    Who Called: pt  Refill or New Rx:Refill   RX Name and Strength:diazePAM (VALIUM) 5 MG tablet 30 tablet   How is the patient currently taking it? (ex. 1XDay):  Is this a 30 day or 90 day RX:  Preferred Pharmacy with phone number:  [facility] #[id number] - [address] [facility]  [address]  [facility] [address]  Phone: [phone] Fax: [phone]  Local or Mail Order:local   Ordering Provider:  Would the patient rather a call back or a response via [facility]? Call back  Best Call Back Number:[phone]  Additional Information PT ALSO LOOKING FOR EAR DROPS

## 2022-01-04 RX ORDER — DIAZEPAM 5 MG/1
5 TABLET ORAL NIGHTLY PRN
Qty: 30 TABLET | Refills: 0 | Status: SHIPPED | OUTPATIENT
Start: 2022-01-04 | End: 2022-02-07 | Stop reason: SDUPTHER

## 2022-01-09 ENCOUNTER — TELEPHONE (OUTPATIENT)
Dept: FAMILY MEDICINE | Facility: CLINIC | Age: 72
End: 2022-01-09
Payer: MEDICARE

## 2022-01-09 NOTE — TELEPHONE ENCOUNTER
----- Message from Mila Vergara sent at 1/7/2022  9:11 AM CST -----  Contact: patient  Type: Needs Medical Advice  Who Called:  patient   Symptoms (please be specific):    How long has patient had these symptoms:    Pharmacy name and phone #:    [facility][id number] - [facility] [place] - [address] [facility] [address]  [address]  [place] [address]  Phone: [phone] Fax: [phone]        Best Call Back Number: [phone] (home)     Additional Information: patient requesting a return call concerning her prescription for furosemide (LASIX) 20 MG tablet, states he has not picked up in 3 months and is unable to get a refill form the pharmacy, states they gave it to him already

## 2022-01-20 DIAGNOSIS — R60.0 LOCALIZED EDEMA: ICD-10-CM

## 2022-01-20 DIAGNOSIS — N52.8 OTHER MALE ERECTILE DYSFUNCTION: ICD-10-CM

## 2022-01-20 NOTE — TELEPHONE ENCOUNTER
----- Message from Mila Boyd sent at 1/20/2022  7:12 AM CST -----  Type:  RX Refill Request    Who Called:  Patient  RX Name and Strength:  furosemide (LASIX) 20 MG tablet  Preferred Pharmacy with phone number:  [facility]  Best Call Back Number:  [phone]  Additional Information: Patient is out of medication

## 2022-01-20 NOTE — TELEPHONE ENCOUNTER
No new care gaps identified.  Powered by [employer] by [employer]. Reference number: [id number].   1/20/2022 10:01:35 AM CST

## 2022-01-24 RX ORDER — FUROSEMIDE 20 MG/1
20 TABLET ORAL DAILY
Qty: 90 TABLET | Refills: 3 | Status: SHIPPED | OUTPATIENT
Start: 2022-01-24 | End: 2022-01-25 | Stop reason: SDUPTHER

## 2022-01-24 RX ORDER — TADALAFIL 10 MG/1
10 TABLET ORAL DAILY PRN
Qty: 30 TABLET | Refills: 5 | Status: SHIPPED | OUTPATIENT
Start: 2022-01-24 | End: 2022-02-17 | Stop reason: SDUPTHER

## 2022-01-24 NOTE — TELEPHONE ENCOUNTER
----- Message from Doug Dela Cruz sent at 1/24/2022  9:48 AM CST -----  Contact: Self  Type:  RX Refill Request    Who Called:  Patient  Refill or New Rx:  Refill  RX Name and Strength:  furosemide (LASIX) 20 MG tablet  tadalafiL (CIALIS) 10 MG tablet    How is the patient currently taking it? (ex. 1XDay):  n/a  Is this a 30 day or 90 day RX:  n/a  Preferred Pharmacy with phone number:      [facility] [address]  Phone: [phone] Fax: [phone]      Local or Mail Order:  local  Ordering Provider: Melania Marie Call Back Number: [phone]  Additional Information:  PT states that he is out of both medications, and states he been out of the Furosemide for 2 weeks now. Pt states he needs this refilled ASAP. Please call pt back at [phone] to update and advise.

## 2022-01-25 DIAGNOSIS — R60.0 LOCALIZED EDEMA: ICD-10-CM

## 2022-01-25 RX ORDER — FUROSEMIDE 20 MG/1
20 TABLET ORAL DAILY
Qty: 90 TABLET | Refills: 3 | Status: SHIPPED | OUTPATIENT
Start: 2022-01-25 | End: 2022-10-13

## 2022-01-25 NOTE — TELEPHONE ENCOUNTER
No new care gaps identified.  Powered by [employer] by [employer]. Reference number: [id number].   1/25/2022 11:01:26 AM CST

## 2022-02-07 DIAGNOSIS — F41.9 ANXIETY: ICD-10-CM

## 2022-02-07 DIAGNOSIS — Z98.890 S/P LUMBAR SPINE OPERATION: ICD-10-CM

## 2022-02-07 RX ORDER — DIAZEPAM 5 MG/1
5 TABLET ORAL NIGHTLY PRN
Qty: 30 TABLET | Refills: 0 | Status: SHIPPED | OUTPATIENT
Start: 2022-02-07 | End: 2022-03-10 | Stop reason: SDUPTHER

## 2022-02-07 NOTE — TELEPHONE ENCOUNTER
No new care gaps identified.  Powered by [employer] by [employer]. Reference number: [id number].   2/07/2022 9:44:12 AM CST

## 2022-02-07 NOTE — TELEPHONE ENCOUNTER
----- Message from Kerri Walters sent at 2/7/2022  9:17 AM CST -----  Contact: pt  Type:  RX Refill Request    Who Called:  pt   Refill or New Rx:  refill   RX Name and Strength:  diazePAM (VALIUM) 5 MG tablet  Preferred Pharmacy with phone number:    [facility][id number] - [address] [address]  Phone: [phone] Fax: [phone]    Local or Mail Order:  local   Ordering Provider:  Melania Marie Call Back Number:  [phone]    Additional Information:

## 2022-02-09 ENCOUNTER — TELEPHONE (OUTPATIENT)
Dept: FAMILY MEDICINE | Facility: CLINIC | Age: 72
End: 2022-02-09
Payer: MEDICARE

## 2022-02-09 DIAGNOSIS — G89.29 CHRONIC BILATERAL LOW BACK PAIN, UNSPECIFIED WHETHER SCIATICA PRESENT: Primary | ICD-10-CM

## 2022-02-09 DIAGNOSIS — M54.50 CHRONIC BILATERAL LOW BACK PAIN, UNSPECIFIED WHETHER SCIATICA PRESENT: Primary | ICD-10-CM

## 2022-02-09 NOTE — TELEPHONE ENCOUNTER
----- Message from Sigrid Simmons sent at 2/9/2022  8:32 AM CST -----  Contact: patient  Type: Needs Medical Advice  Who Called:  patient  Best Call Back Number: [phone] (home)   Additional Information: patient would like to continue physical therapy at [facility]- please send a new order to continue

## 2022-02-14 ENCOUNTER — TELEPHONE (OUTPATIENT)
Dept: GASTROENTEROLOGY | Facility: CLINIC | Age: 72
End: 2022-02-14
Payer: MEDICARE

## 2022-02-14 NOTE — TELEPHONE ENCOUNTER
----- Message from Yadi Gibbs, Patient Care Assistant sent at 2/14/2022  9:01 AM CST -----  Contact: Pt  Type: Needs Medical Advice    Who Called: Pt  Best Call Back Number: [phone]    Inquiry/Question: Pt is calling to reschedule his colonoscopy. Pt states he needs to have enough time to schedule transportation and also pay his copay. Please call back and advise Thank you~

## 2022-02-14 NOTE — TELEPHONE ENCOUNTER
Spoke with pt. Rescheduled procedure per pt request. Pt states the mag citrate prep will not work for him. golytely called into pharmacy. New prep instructions placed in mail.

## 2022-02-16 ENCOUNTER — TELEPHONE (OUTPATIENT)
Dept: FAMILY MEDICINE | Facility: CLINIC | Age: 72
End: 2022-02-16
Payer: MEDICARE

## 2022-02-16 NOTE — TELEPHONE ENCOUNTER
----- Message from Sammi Dinero sent at 2/16/2022 11:59 AM CST -----  Contact: pt  Type: Needs Medical Advice    Who Called: pt  Best Call Back Number:[phone]   Requesting a call back regarding - pt  is asking for a nurse call back please wants to speak with nurse ASAP   Please Advise- Thank you

## 2022-02-17 ENCOUNTER — OFFICE VISIT (OUTPATIENT)
Dept: FAMILY MEDICINE | Facility: CLINIC | Age: 72
End: 2022-02-17
Payer: MEDICARE

## 2022-02-17 VITALS
BODY MASS INDEX: 32.2 KG/M2 | HEART RATE: 70 BPM | WEIGHT: 187.63 LBS | DIASTOLIC BLOOD PRESSURE: 76 MMHG | TEMPERATURE: 98 F | SYSTOLIC BLOOD PRESSURE: 110 MMHG | OXYGEN SATURATION: 97 %

## 2022-02-17 DIAGNOSIS — G89.29 CHRONIC BILATERAL LOW BACK PAIN WITHOUT SCIATICA: Primary | ICD-10-CM

## 2022-02-17 DIAGNOSIS — M54.50 CHRONIC BILATERAL LOW BACK PAIN WITHOUT SCIATICA: Primary | ICD-10-CM

## 2022-02-17 DIAGNOSIS — M54.50 CHRONIC BILATERAL LOW BACK PAIN WITHOUT SCIATICA: ICD-10-CM

## 2022-02-17 DIAGNOSIS — K59.00 CONSTIPATION, UNSPECIFIED CONSTIPATION TYPE: Primary | ICD-10-CM

## 2022-02-17 DIAGNOSIS — N52.8 OTHER MALE ERECTILE DYSFUNCTION: ICD-10-CM

## 2022-02-17 DIAGNOSIS — G89.29 CHRONIC BILATERAL LOW BACK PAIN WITHOUT SCIATICA: ICD-10-CM

## 2022-02-17 PROCEDURE — 3008F BODY MASS INDEX DOCD: CPT | Mod: CPTII,S$GLB,, | Performed by: PHYSICIAN ASSISTANT

## 2022-02-17 PROCEDURE — 3008F PR BODY MASS INDEX (BMI) DOCUMENTED: ICD-10-PCS | Mod: CPTII,S$GLB,, | Performed by: PHYSICIAN ASSISTANT

## 2022-02-17 PROCEDURE — 3078F PR MOST RECENT DIASTOLIC BLOOD PRESSURE < 80 MM HG: ICD-10-PCS | Mod: CPTII,S$GLB,, | Performed by: PHYSICIAN ASSISTANT

## 2022-02-17 PROCEDURE — 3074F SYST BP LT 130 MM HG: CPT | Mod: CPTII,S$GLB,, | Performed by: PHYSICIAN ASSISTANT

## 2022-02-17 PROCEDURE — 1126F AMNT PAIN NOTED NONE PRSNT: CPT | Mod: CPTII,S$GLB,, | Performed by: PHYSICIAN ASSISTANT

## 2022-02-17 PROCEDURE — 99214 PR OFFICE/OUTPT VISIT, EST, LEVL IV, 30-39 MIN: ICD-10-PCS | Mod: S$GLB,,, | Performed by: PHYSICIAN ASSISTANT

## 2022-02-17 PROCEDURE — 1159F MED LIST DOCD IN RCRD: CPT | Mod: CPTII,S$GLB,, | Performed by: PHYSICIAN ASSISTANT

## 2022-02-17 PROCEDURE — 1159F PR MEDICATION LIST DOCUMENTED IN MEDICAL RECORD: ICD-10-PCS | Mod: CPTII,S$GLB,, | Performed by: PHYSICIAN ASSISTANT

## 2022-02-17 PROCEDURE — 99214 OFFICE O/P EST MOD 30 MIN: CPT | Mod: S$GLB,,, | Performed by: PHYSICIAN ASSISTANT

## 2022-02-17 PROCEDURE — 3288F FALL RISK ASSESSMENT DOCD: CPT | Mod: CPTII,S$GLB,, | Performed by: PHYSICIAN ASSISTANT

## 2022-02-17 PROCEDURE — 1126F PR PAIN SEVERITY QUANTIFIED, NO PAIN PRESENT: ICD-10-PCS | Mod: CPTII,S$GLB,, | Performed by: PHYSICIAN ASSISTANT

## 2022-02-17 PROCEDURE — 3078F DIAST BP <80 MM HG: CPT | Mod: CPTII,S$GLB,, | Performed by: PHYSICIAN ASSISTANT

## 2022-02-17 PROCEDURE — 3288F PR FALLS RISK ASSESSMENT DOCUMENTED: ICD-10-PCS | Mod: CPTII,S$GLB,, | Performed by: PHYSICIAN ASSISTANT

## 2022-02-17 PROCEDURE — 99999 PR PBB SHADOW E&M-EST. PATIENT-LVL IV: ICD-10-PCS | Mod: PBBFAC,,, | Performed by: PHYSICIAN ASSISTANT

## 2022-02-17 PROCEDURE — 1101F PR PT FALLS ASSESS DOC 0-1 FALLS W/OUT INJ PAST YR: ICD-10-PCS | Mod: CPTII,S$GLB,, | Performed by: PHYSICIAN ASSISTANT

## 2022-02-17 PROCEDURE — 99999 PR PBB SHADOW E&M-EST. PATIENT-LVL IV: CPT | Mod: PBBFAC,,, | Performed by: PHYSICIAN ASSISTANT

## 2022-02-17 PROCEDURE — 3074F PR MOST RECENT SYSTOLIC BLOOD PRESSURE < 130 MM HG: ICD-10-PCS | Mod: CPTII,S$GLB,, | Performed by: PHYSICIAN ASSISTANT

## 2022-02-17 PROCEDURE — 1101F PT FALLS ASSESS-DOCD LE1/YR: CPT | Mod: CPTII,S$GLB,, | Performed by: PHYSICIAN ASSISTANT

## 2022-02-17 RX ORDER — TADALAFIL 20 MG/1
20 TABLET ORAL DAILY PRN
Qty: 30 TABLET | Refills: 1 | Status: SHIPPED | OUTPATIENT
Start: 2022-02-17 | End: 2022-02-17 | Stop reason: SDUPTHER

## 2022-02-17 RX ORDER — MELOXICAM 15 MG/1
15 TABLET ORAL DAILY PRN
Qty: 30 TABLET | Refills: 1 | Status: SHIPPED | OUTPATIENT
Start: 2022-02-17 | End: 2022-05-26

## 2022-02-17 RX ORDER — HYDROCODONE BITARTRATE AND ACETAMINOPHEN 10; 325 MG/1; MG/1
1 TABLET ORAL EVERY 8 HOURS PRN
Qty: 20 TABLET | Refills: 0 | Status: SHIPPED | OUTPATIENT
Start: 2022-02-17 | End: 2022-06-20 | Stop reason: SDUPTHER

## 2022-02-17 RX ORDER — TADALAFIL 20 MG/1
20 TABLET ORAL DAILY PRN
Qty: 30 TABLET | Refills: 1 | Status: SHIPPED | OUTPATIENT
Start: 2022-02-17 | End: 2022-02-19 | Stop reason: SDUPTHER

## 2022-02-17 NOTE — TELEPHONE ENCOUNTER
The patient saw me today complaining of chronic back pain.  He requests a small prescription hydrocodone for him to take p.r.n..  It has been over years since he had any hydrocodone.  He takes Valium every night for anxiety.   checked.  I also prescribed him a higher dose of Mobic to take because he did not get any relief with the Mobic 7.5 mg tablets

## 2022-02-17 NOTE — PROGRESS NOTES
Subjective:       Patient ID: Alexis Medrano Jr. is a 71 y.o. male       Chief Complaint: Back Pain    HPI  Patient's  PCP: Kalani Velázquez MD.  The patient's last visit with me was on 12/8/2021  Patient's past medical history includes:  Chronic neck and back pain, COPD, hypertension, hyperlipidemia and anxiety    Anxiety:  He takes Valium as needed for panic and anxiety and denies any side effects.     Chronic low back pain:  He requests something for his chronic back pain.  He has been seen in the past by Pain Management and been offered procedures which she has refused.  He has tried taking the Mobic 7.5 mg tablets without any relief.  He requests something else today.  He specifically request hydrocodone 10 mg tablets because he likes to take that as needed for severe back pain flare-up.    Constipation:  He has been taking MiraLax without any relief.  He admits that his constipation has improved since he is no longer taking narcotics as often.    Review of Systems   Constitutional: Negative for chills and fever.   HENT: Negative for congestion and sore throat.    Eyes: Negative for visual disturbance.   Respiratory: Negative for cough and shortness of breath.    Cardiovascular: Negative for chest pain.   Gastrointestinal: Negative for diarrhea, nausea and vomiting.   Musculoskeletal: Positive for back pain and neck pain. Negative for arthralgias.   Skin: Negative for rash.   Neurological: Negative for headaches.   Psychiatric/Behavioral: Negative for sleep disturbance.        Objective:   Physical Exam  Constitutional:       General: He is not in acute distress.     Appearance: He is well-developed and well-nourished.   HENT:      Head: Normocephalic and atraumatic.      Right Ear: External ear normal.      Left Ear: External ear normal.      Nose: Nose normal.      Mouth/Throat:      Mouth: Oropharynx is clear and moist.   Eyes:      Conjunctiva/sclera: Conjunctivae normal.      Pupils: Pupils are equal, round, and  reactive to light.   Cardiovascular:      Rate and Rhythm: Normal rate and regular rhythm.      Heart sounds: Normal heart sounds. No murmur heard.      Pulmonary:      Effort: Pulmonary effort is normal. No respiratory distress.      Breath sounds: Normal breath sounds. No wheezing.   Abdominal:      General: Bowel sounds are normal.      Palpations: Abdomen is soft.      Tenderness: There is no abdominal tenderness.   Musculoskeletal:         General: Normal range of motion.      Cervical back: Normal range of motion and neck supple.   Skin:     General: Skin is warm and dry.      Findings: No rash.   Neurological:      Mental Status: He is alert and oriented to person, place, and time.   Psychiatric:         Mood and Affect: Mood and affect normal.         Behavior: Behavior normal.          Assessment:       1. Constipation, unspecified constipation type  linaCLOtide (LINZESS) 145 mcg Cap capsule   2. Chronic bilateral low back pain without sciatica  meloxicam (MOBIC) 15 MG tablet   3. Other male erectile dysfunction  tadalafiL (CIALIS) 20 MG Tab    DISCONTINUED: tadalafiL (CIALIS) 20 MG Tab        Plan:       Constipation, unspecified constipation type  -     START linaCLOtide (LINZESS) 145 mcg Cap capsule; Take 1 capsule (145 mcg total) by mouth before breakfast.  Dispense: 30 capsule; Refill: 11    Chronic bilateral low back pain without sciatica  -     INCREASED meloxicam (MOBIC) 15 MG tablet; Take 1 tablet (15 mg total) by mouth daily as needed for Pain (back pain).  Dispense: 30 tablet; Refill: 1  - message sent to Dr. Velázquez to see if she would like to prescribe him p.r.n. prescription for hydrocodone    Other male erectile dysfunction  -     INCREASED tadalafiL (CIALIS) 20 MG Tab; Take 1 tablet (20 mg total) by mouth daily as needed.  Dispense: 30 tablet; Refill: 1         No follow-ups on file.       Karyn Zacarias PA-C   02/17/2022   9:16 AM

## 2022-02-19 DIAGNOSIS — N52.8 OTHER MALE ERECTILE DYSFUNCTION: ICD-10-CM

## 2022-02-19 NOTE — TELEPHONE ENCOUNTER
No new care gaps identified.  Powered by [employer] by [employer]. Reference number: [id number].   2/19/2022 10:41:16 AM CST

## 2022-02-21 RX ORDER — TADALAFIL 20 MG/1
20 TABLET ORAL DAILY PRN
Qty: 30 TABLET | Refills: 1 | Status: SHIPPED | OUTPATIENT
Start: 2022-02-21 | End: 2022-06-20

## 2022-03-07 DIAGNOSIS — G89.29 CHRONIC BILATERAL LOW BACK PAIN WITHOUT SCIATICA: ICD-10-CM

## 2022-03-07 DIAGNOSIS — M54.50 CHRONIC BILATERAL LOW BACK PAIN WITHOUT SCIATICA: ICD-10-CM

## 2022-03-07 RX ORDER — HYDROCODONE BITARTRATE AND ACETAMINOPHEN 10; 325 MG/1; MG/1
1 TABLET ORAL EVERY 8 HOURS PRN
Qty: 20 TABLET | Refills: 0 | OUTPATIENT
Start: 2022-03-07

## 2022-03-07 NOTE — TELEPHONE ENCOUNTER
----- Message from Ritu Darden sent at 3/7/2022  1:40 PM CST -----  Regarding: Refill  Contact: Patient  Type:  RX Refill Request    Who Called:  Patient   Refill or New Rx:  Refill  RX Name and Strength:  Hydrocodone  How is the patient currently taking it? (ex. 1XDay):  one time a day  Is this a 30 day or 90 day RX:  30day  Preferred Pharmacy with phone number:    [facility] #[id number] - [address] [address]  Phone: [phone] Fax: [phone]    Local or Mail Order:  Local  Ordering Provider:  Dr. Melania Marie Call Back Number:  [phone] (home)     Case number [id number]

## 2022-03-07 NOTE — TELEPHONE ENCOUNTER
No new care gaps identified.  Powered by [employer] by [employer]. Reference number: [id number].   3/07/2022 1:52:52 PM CST

## 2022-03-10 ENCOUNTER — OFFICE VISIT (OUTPATIENT)
Dept: FAMILY MEDICINE | Facility: CLINIC | Age: 72
End: 2022-03-10
Payer: MEDICARE

## 2022-03-10 ENCOUNTER — LAB VISIT (OUTPATIENT)
Dept: LAB | Facility: HOSPITAL | Age: 72
End: 2022-03-10
Attending: FAMILY MEDICINE
Payer: MEDICARE

## 2022-03-10 VITALS
WEIGHT: 183.63 LBS | BODY MASS INDEX: 31.35 KG/M2 | OXYGEN SATURATION: 95 % | HEART RATE: 70 BPM | SYSTOLIC BLOOD PRESSURE: 120 MMHG | HEIGHT: 64 IN | DIASTOLIC BLOOD PRESSURE: 70 MMHG

## 2022-03-10 DIAGNOSIS — I25.708 CORONARY ARTERY DISEASE OF BYPASS GRAFT OF NATIVE HEART WITH STABLE ANGINA PECTORIS: ICD-10-CM

## 2022-03-10 DIAGNOSIS — J44.9 CHRONIC OBSTRUCTIVE PULMONARY DISEASE, UNSPECIFIED COPD TYPE: ICD-10-CM

## 2022-03-10 DIAGNOSIS — M54.16 LUMBAR RADICULOPATHY: ICD-10-CM

## 2022-03-10 DIAGNOSIS — Z87.891 PERSONAL HISTORY OF NICOTINE DEPENDENCE: ICD-10-CM

## 2022-03-10 DIAGNOSIS — F41.9 ANXIETY: ICD-10-CM

## 2022-03-10 DIAGNOSIS — F17.200 TOBACCO DEPENDENCE: ICD-10-CM

## 2022-03-10 DIAGNOSIS — Z98.890 S/P LUMBAR SPINE OPERATION: ICD-10-CM

## 2022-03-10 DIAGNOSIS — I10 ESSENTIAL HYPERTENSION: Primary | ICD-10-CM

## 2022-03-10 DIAGNOSIS — I73.9 PAD (PERIPHERAL ARTERY DISEASE): ICD-10-CM

## 2022-03-10 DIAGNOSIS — E78.49 OTHER HYPERLIPIDEMIA: ICD-10-CM

## 2022-03-10 LAB
BASOPHILS # BLD AUTO: 0.05 K/UL (ref 0–0.2)
BASOPHILS NFR BLD: 0.5 % (ref 0–1.9)
DIFFERENTIAL METHOD: ABNORMAL
EOSINOPHIL # BLD AUTO: 0.2 K/UL (ref 0–0.5)
EOSINOPHIL NFR BLD: 1.5 % (ref 0–8)
ERYTHROCYTE [DISTWIDTH] IN BLOOD BY AUTOMATED COUNT: 12.4 % (ref 11.5–14.5)
HCT VFR BLD AUTO: 46.3 % (ref 40–54)
HGB BLD-MCNC: 15.1 G/DL (ref 14–18)
IMM GRANULOCYTES # BLD AUTO: 0.03 K/UL (ref 0–0.04)
IMM GRANULOCYTES NFR BLD AUTO: 0.3 % (ref 0–0.5)
LYMPHOCYTES # BLD AUTO: 1.8 K/UL (ref 1–4.8)
LYMPHOCYTES NFR BLD: 17.2 % (ref 18–48)
MCH RBC QN AUTO: 28.7 PG (ref 27–31)
MCHC RBC AUTO-ENTMCNC: 32.6 G/DL (ref 32–36)
MCV RBC AUTO: 88 FL (ref 82–98)
MONOCYTES # BLD AUTO: 0.8 K/UL (ref 0.3–1)
MONOCYTES NFR BLD: 7.5 % (ref 4–15)
NEUTROPHILS # BLD AUTO: 7.7 K/UL (ref 1.8–7.7)
NEUTROPHILS NFR BLD: 73 % (ref 38–73)
NRBC BLD-RTO: 0 /100 WBC
PLATELET # BLD AUTO: 225 K/UL (ref 150–450)
PMV BLD AUTO: 12.5 FL (ref 9.2–12.9)
RBC # BLD AUTO: 5.27 M/UL (ref 4.6–6.2)
WBC # BLD AUTO: 10.52 K/UL (ref 3.9–12.7)

## 2022-03-10 PROCEDURE — 3008F PR BODY MASS INDEX (BMI) DOCUMENTED: ICD-10-PCS | Mod: CPTII,S$GLB,, | Performed by: FAMILY MEDICINE

## 2022-03-10 PROCEDURE — 3288F PR FALLS RISK ASSESSMENT DOCUMENTED: ICD-10-PCS | Mod: CPTII,S$GLB,, | Performed by: FAMILY MEDICINE

## 2022-03-10 PROCEDURE — 1125F PR PAIN SEVERITY QUANTIFIED, PAIN PRESENT: ICD-10-PCS | Mod: CPTII,S$GLB,, | Performed by: FAMILY MEDICINE

## 2022-03-10 PROCEDURE — 99999 PR PBB SHADOW E&M-EST. PATIENT-LVL V: CPT | Mod: PBBFAC,,, | Performed by: FAMILY MEDICINE

## 2022-03-10 PROCEDURE — 1159F MED LIST DOCD IN RCRD: CPT | Mod: CPTII,S$GLB,, | Performed by: FAMILY MEDICINE

## 2022-03-10 PROCEDURE — 99999 PR PBB SHADOW E&M-EST. PATIENT-LVL V: ICD-10-PCS | Mod: PBBFAC,,, | Performed by: FAMILY MEDICINE

## 2022-03-10 PROCEDURE — 36415 COLL VENOUS BLD VENIPUNCTURE: CPT | Mod: PO | Performed by: FAMILY MEDICINE

## 2022-03-10 PROCEDURE — 99499 UNLISTED E&M SERVICE: CPT | Mod: S$GLB,,, | Performed by: FAMILY MEDICINE

## 2022-03-10 PROCEDURE — 99499 RISK ADDL DX/OHS AUDIT: ICD-10-PCS | Mod: S$GLB,,, | Performed by: FAMILY MEDICINE

## 2022-03-10 PROCEDURE — 99214 PR OFFICE/OUTPT VISIT, EST, LEVL IV, 30-39 MIN: ICD-10-PCS | Mod: S$GLB,,, | Performed by: FAMILY MEDICINE

## 2022-03-10 PROCEDURE — 3288F FALL RISK ASSESSMENT DOCD: CPT | Mod: CPTII,S$GLB,, | Performed by: FAMILY MEDICINE

## 2022-03-10 PROCEDURE — 3074F PR MOST RECENT SYSTOLIC BLOOD PRESSURE < 130 MM HG: ICD-10-PCS | Mod: CPTII,S$GLB,, | Performed by: FAMILY MEDICINE

## 2022-03-10 PROCEDURE — 1101F PR PT FALLS ASSESS DOC 0-1 FALLS W/OUT INJ PAST YR: ICD-10-PCS | Mod: CPTII,S$GLB,, | Performed by: FAMILY MEDICINE

## 2022-03-10 PROCEDURE — 1125F AMNT PAIN NOTED PAIN PRSNT: CPT | Mod: CPTII,S$GLB,, | Performed by: FAMILY MEDICINE

## 2022-03-10 PROCEDURE — 1159F PR MEDICATION LIST DOCUMENTED IN MEDICAL RECORD: ICD-10-PCS | Mod: CPTII,S$GLB,, | Performed by: FAMILY MEDICINE

## 2022-03-10 PROCEDURE — 99214 OFFICE O/P EST MOD 30 MIN: CPT | Mod: S$GLB,,, | Performed by: FAMILY MEDICINE

## 2022-03-10 PROCEDURE — 3008F BODY MASS INDEX DOCD: CPT | Mod: CPTII,S$GLB,, | Performed by: FAMILY MEDICINE

## 2022-03-10 PROCEDURE — 85025 COMPLETE CBC W/AUTO DIFF WBC: CPT | Performed by: FAMILY MEDICINE

## 2022-03-10 PROCEDURE — 3074F SYST BP LT 130 MM HG: CPT | Mod: CPTII,S$GLB,, | Performed by: FAMILY MEDICINE

## 2022-03-10 PROCEDURE — 3078F DIAST BP <80 MM HG: CPT | Mod: CPTII,S$GLB,, | Performed by: FAMILY MEDICINE

## 2022-03-10 PROCEDURE — 1101F PT FALLS ASSESS-DOCD LE1/YR: CPT | Mod: CPTII,S$GLB,, | Performed by: FAMILY MEDICINE

## 2022-03-10 PROCEDURE — 3078F PR MOST RECENT DIASTOLIC BLOOD PRESSURE < 80 MM HG: ICD-10-PCS | Mod: CPTII,S$GLB,, | Performed by: FAMILY MEDICINE

## 2022-03-10 RX ORDER — PREGABALIN 75 MG/1
75 CAPSULE ORAL 2 TIMES DAILY
Qty: 60 CAPSULE | Refills: 2 | Status: SHIPPED | OUTPATIENT
Start: 2022-03-10 | End: 2022-03-24

## 2022-03-10 RX ORDER — DIAZEPAM 5 MG/1
5 TABLET ORAL NIGHTLY PRN
Qty: 30 TABLET | Refills: 2 | Status: SHIPPED | OUTPATIENT
Start: 2022-03-10 | End: 2022-06-05

## 2022-03-14 ENCOUNTER — TELEPHONE (OUTPATIENT)
Dept: FAMILY MEDICINE | Facility: CLINIC | Age: 72
End: 2022-03-14
Payer: MEDICARE

## 2022-03-14 NOTE — TELEPHONE ENCOUNTER
----- Message from Brittney Carreon sent at 3/12/2022  8:16 AM CST -----  Contact: pt  RESULTS    Pt is wanting his lab results, please call pt at the following [phone]      Thanks

## 2022-03-15 ENCOUNTER — TELEPHONE (OUTPATIENT)
Dept: FAMILY MEDICINE | Facility: CLINIC | Age: 72
End: 2022-03-15
Payer: MEDICARE

## 2022-03-15 NOTE — TELEPHONE ENCOUNTER
----- Message from Kalani Velázquez MD sent at 3/11/2022  6:35 AM CST -----  Please notify patient that the results of the blood count was in normal range.  Thank you.

## 2022-03-16 ENCOUNTER — TELEPHONE (OUTPATIENT)
Dept: CARDIOLOGY | Facility: CLINIC | Age: 72
End: 2022-03-16
Payer: MEDICARE

## 2022-03-16 NOTE — TELEPHONE ENCOUNTER
----- Message from Anita Santiago sent at 3/16/2022 12:38 PM CDT -----  Regarding: RE: pt called  Dr covarrubias are not open for me to schedule appt  ----- Message -----  From: Sindy Negrete LPN  Sent: 3/16/2022   8:53 AM CDT  To: Anita Santiago  Subject: RE: pt called                                    Please schedule the appointment   ----- Message -----  From: Anita Santiago  Sent: 3/16/2022   8:52 AM CDT  To: Miguel [facility]OW[facility] Staff  Subject: pt called                                        Name of Who is Calling: KAY MANZO JR. [[id number]]      What is the request in detail: pt is requesting to set up his yearly appt. Please advise       Can the clinic reply by [facility]: No      What [facility]: [phone]

## 2022-03-18 NOTE — PROGRESS NOTES
Subjective:       Patient ID: Alexis Medrano Jr. is a 71 y.o. male.    Chief Complaint: Back Pain (leg) and Leg Pain    HPI     Back pain:  The patient is complaining of back pain, the patient stated that physical therapy is improving his symptoms but still having issues with weakness and some radiation on the lower extremities, the patient would like a prescription for hydrocodone, currently also taking Valium for muscle spasms and anxiety, he is asking for the prescription as well.  The patient still smoking cigarettes, he wants to quit.    Hypertension:  The patient has been taking his blood pressure medicines as directed, denies any side effects of the medication.  The patient did not bring a log of the blood pressure readings from home.    Hyperlipidemia:  The last cholesterol levels showed that the HDL levels were low, the patient currently is taking Crestor 20 mg daily, has also coronary artery disease.    Past medical history, past social history was reviewed and discussed with the patient.    Review of Systems   Constitutional: Positive for activity change. Negative for appetite change.   HENT: Negative for congestion and ear discharge.    Eyes: Negative for discharge and itching.   Respiratory: Negative for choking and chest tightness.    Cardiovascular: Negative for chest pain and leg swelling.   Gastrointestinal: Negative for abdominal distention and abdominal pain.   Endocrine: Negative for cold intolerance and heat intolerance.   Genitourinary: Negative for dysuria and flank pain.   Musculoskeletal: Positive for arthralgias and back pain.   Skin: Negative for pallor and rash.   Allergic/Immunologic: Negative for environmental allergies and food allergies.   Neurological: Positive for weakness and numbness. Negative for dizziness and facial asymmetry.   Hematological: Negative for adenopathy. Does not bruise/bleed easily.   Psychiatric/Behavioral: Positive for sleep disturbance. Negative for agitation and  confusion. The patient is nervous/anxious.        Objective:      Physical Exam  Vitals and nursing note reviewed.   Constitutional:       General: He is not in acute distress.     Appearance: Normal appearance. He is well-developed. He is obese. He is not diaphoretic.   HENT:      Head: Normocephalic and atraumatic.      Right Ear: External ear normal.      Left Ear: External ear normal.      Nose: Nose normal.   Eyes:      General: No scleral icterus.        Left eye: No discharge.   Neck:      Thyroid: No thyromegaly.      Trachea: No tracheal deviation.   Cardiovascular:      Rate and Rhythm: Normal rate and regular rhythm.      Heart sounds: Normal heart sounds. No murmur heard.    No friction rub.   Pulmonary:      Effort: Pulmonary effort is normal. No respiratory distress.      Breath sounds: Wheezing present.   Musculoskeletal:         General: Tenderness (Lower back) present.      Cervical back: Normal range of motion and neck supple.   Skin:     General: Skin is warm and dry.      Coloration: Skin is not pale.      Findings: No erythema.   Neurological:      Mental Status: He is alert.      Cranial Nerves: No cranial nerve deficit.      Motor: No abnormal muscle tone.      Coordination: Coordination normal.   Psychiatric:         Behavior: Behavior normal.         Thought Content: Thought content normal.         Judgment: Judgment normal.         Assessment:       1. Essential hypertension    2. Tobacco dependence    3. Anxiety    4. S/P lumbar spine operation    5. Coronary artery disease of bypass graft of native heart with stable angina pectoris    6. PAD (peripheral artery disease)    7. Chronic obstructive pulmonary disease, unspecified COPD type    8. Other hyperlipidemia    9. Personal history of nicotine dependence    10. Lumbar radiculopathy        Plan:       Essential hypertension:  Stable    Tobacco dependence:  Worsening    Anxiety:  Stable  -     diazePAM (VALIUM) 5 MG tablet; Take 1 tablet  (5 mg total) by mouth nightly as needed for Anxiety.  Dispense: 30 tablet; Refill: 2    S/P lumbar spine operation:  Stable  -     diazePAM (VALIUM) 5 MG tablet; Take 1 tablet (5 mg total) by mouth nightly as needed for Anxiety.  Dispense: 30 tablet; Refill: 2    Coronary artery disease of bypass graft of native heart with stable angina pectoris:  Stable  -     CBC Auto Differential; Future; Expected date: 03/10/2022    PAD (peripheral artery disease):  Stable    Chronic obstructive pulmonary disease, unspecified COPD type:  Stable    Other hyperlipidemia:  Uncontrolled    Personal history of nicotine dependence:  -     CT Chest Lung Screening Low Dose; Future; Expected date: 03/10/2022    Lumbar radiculopathy:  Stable  -     pregabalin (LYRICA) 75 MG capsule; Take 1 capsule (75 mg total) by mouth 2 (two) times daily.  Dispense: 60 capsule; Refill: 2      [place] prescription monitoring program was checked and okay, will start patient on Lyrica and will see the patient back in 12 weeks.  Valium also was prescribed for the patient.  The patient's BMI has been recorded in the chart. The patient has been provided educational materials regarding the benefits of attaining and maintaining a normal weight. We will continue to address and follow this issue during follow up visits.   Patient agreed with assessment and plan. Patient verbalized understanding.

## 2022-03-24 ENCOUNTER — HOSPITAL ENCOUNTER (OUTPATIENT)
Dept: RADIOLOGY | Facility: HOSPITAL | Age: 72
Discharge: HOME | End: 2022-03-24
Attending: FAMILY MEDICINE
Payer: MEDICARE

## 2022-03-24 ENCOUNTER — OFFICE VISIT (OUTPATIENT)
Dept: FAMILY MEDICINE | Facility: CLINIC | Age: 72
End: 2022-03-24
Payer: MEDICARE

## 2022-03-24 VITALS
BODY MASS INDEX: 31.35 KG/M2 | HEIGHT: 64 IN | SYSTOLIC BLOOD PRESSURE: 115 MMHG | DIASTOLIC BLOOD PRESSURE: 70 MMHG | OXYGEN SATURATION: 97 % | HEART RATE: 71 BPM | WEIGHT: 183.63 LBS

## 2022-03-24 DIAGNOSIS — M54.16 LUMBAR RADICULOPATHY: ICD-10-CM

## 2022-03-24 DIAGNOSIS — F17.200 TOBACCO DEPENDENCE: Primary | ICD-10-CM

## 2022-03-24 DIAGNOSIS — Z87.891 PERSONAL HISTORY OF NICOTINE DEPENDENCE: ICD-10-CM

## 2022-03-24 DIAGNOSIS — G89.29 OTHER CHRONIC PAIN: ICD-10-CM

## 2022-03-24 DIAGNOSIS — F41.9 ANXIETY: ICD-10-CM

## 2022-03-24 PROCEDURE — 71271 CT THORAX LUNG CANCER SCR C-: CPT | Mod: 26,,, | Performed by: RADIOLOGY

## 2022-03-24 PROCEDURE — 3008F BODY MASS INDEX DOCD: CPT | Mod: CPTII,S$GLB,, | Performed by: PHYSICIAN ASSISTANT

## 2022-03-24 PROCEDURE — 1160F RVW MEDS BY RX/DR IN RCRD: CPT | Mod: CPTII,S$GLB,, | Performed by: PHYSICIAN ASSISTANT

## 2022-03-24 PROCEDURE — 1159F PR MEDICATION LIST DOCUMENTED IN MEDICAL RECORD: ICD-10-PCS | Mod: CPTII,S$GLB,, | Performed by: PHYSICIAN ASSISTANT

## 2022-03-24 PROCEDURE — 1125F PR PAIN SEVERITY QUANTIFIED, PAIN PRESENT: ICD-10-PCS | Mod: CPTII,S$GLB,, | Performed by: PHYSICIAN ASSISTANT

## 2022-03-24 PROCEDURE — 3074F SYST BP LT 130 MM HG: CPT | Mod: CPTII,S$GLB,, | Performed by: PHYSICIAN ASSISTANT

## 2022-03-24 PROCEDURE — 1160F PR REVIEW ALL MEDS BY PRESCRIBER/CLIN PHARMACIST DOCUMENTED: ICD-10-PCS | Mod: CPTII,S$GLB,, | Performed by: PHYSICIAN ASSISTANT

## 2022-03-24 PROCEDURE — 1159F MED LIST DOCD IN RCRD: CPT | Mod: CPTII,S$GLB,, | Performed by: PHYSICIAN ASSISTANT

## 2022-03-24 PROCEDURE — 99214 PR OFFICE/OUTPT VISIT, EST, LEVL IV, 30-39 MIN: ICD-10-PCS | Mod: S$GLB,,, | Performed by: PHYSICIAN ASSISTANT

## 2022-03-24 PROCEDURE — 71271 CT CHEST LUNG SCREENING LOW DOSE: ICD-10-PCS | Mod: 26,,, | Performed by: RADIOLOGY

## 2022-03-24 PROCEDURE — 1125F AMNT PAIN NOTED PAIN PRSNT: CPT | Mod: CPTII,S$GLB,, | Performed by: PHYSICIAN ASSISTANT

## 2022-03-24 PROCEDURE — 71271 CT THORAX LUNG CANCER SCR C-: CPT | Mod: TC,PO

## 2022-03-24 PROCEDURE — 99214 OFFICE O/P EST MOD 30 MIN: CPT | Mod: S$GLB,,, | Performed by: PHYSICIAN ASSISTANT

## 2022-03-24 PROCEDURE — 3008F PR BODY MASS INDEX (BMI) DOCUMENTED: ICD-10-PCS | Mod: CPTII,S$GLB,, | Performed by: PHYSICIAN ASSISTANT

## 2022-03-24 PROCEDURE — 99999 PR PBB SHADOW E&M-EST. PATIENT-LVL IV: ICD-10-PCS | Mod: PBBFAC,,, | Performed by: PHYSICIAN ASSISTANT

## 2022-03-24 PROCEDURE — 3074F PR MOST RECENT SYSTOLIC BLOOD PRESSURE < 130 MM HG: ICD-10-PCS | Mod: CPTII,S$GLB,, | Performed by: PHYSICIAN ASSISTANT

## 2022-03-24 PROCEDURE — 99999 PR PBB SHADOW E&M-EST. PATIENT-LVL IV: CPT | Mod: PBBFAC,,, | Performed by: PHYSICIAN ASSISTANT

## 2022-03-24 PROCEDURE — 3078F PR MOST RECENT DIASTOLIC BLOOD PRESSURE < 80 MM HG: ICD-10-PCS | Mod: CPTII,S$GLB,, | Performed by: PHYSICIAN ASSISTANT

## 2022-03-24 PROCEDURE — 3078F DIAST BP <80 MM HG: CPT | Mod: CPTII,S$GLB,, | Performed by: PHYSICIAN ASSISTANT

## 2022-03-24 RX ORDER — PREGABALIN 100 MG/1
100 CAPSULE ORAL 2 TIMES DAILY
Qty: 60 CAPSULE | Refills: 2 | Status: SHIPPED | OUTPATIENT
Start: 2022-03-24 | End: 2022-09-29

## 2022-03-24 NOTE — TELEPHONE ENCOUNTER
Please let the patient know that I have spoke with Dr. Velázquez.  She cannot prescribe both pain pills and benzodiazepines together.  She recommends that we increase the Lyrica.  We would like for him to take the Lyrica twice daily every day.

## 2022-03-24 NOTE — PROGRESS NOTES
Subjective:       Patient ID: Alexis Medrano Jr. is a 71 y.o. male       Chief Complaint: No chief complaint on file.    HPI  Patient's  PCP: Kalani Velázquez MD.  The patient's last visit with me was on 2/17/2022  Patient's past medical history includes:  Chronic neck and back pain, COPD, hypertension, hyperlipidemia and anxiety    Back pain:  No relief with Lyrica  Constipation: regular BM on Linzess  PT at [facility]:  Doing PT BID with some relief.     Review of Systems   Constitutional: Negative for chills and fever.   HENT: Negative for congestion and sore throat.    Eyes: Negative for visual disturbance.   Respiratory: Negative for cough and shortness of breath.    Cardiovascular: Negative for chest pain.   Gastrointestinal: Negative for diarrhea, nausea and vomiting.   Musculoskeletal: Positive for back pain and neck pain. Negative for arthralgias.   Skin: Negative for rash.   Neurological: Negative for headaches.   Psychiatric/Behavioral: Negative for sleep disturbance.        Objective:   Physical Exam  Constitutional:       General: He is not in acute distress.     Appearance: He is well-developed.   HENT:      Head: Normocephalic and atraumatic.      Right Ear: External ear normal.      Left Ear: External ear normal.      Nose: Nose normal.   Eyes:      Conjunctiva/sclera: Conjunctivae normal.      Pupils: Pupils are equal, round, and reactive to light.   Neck:      Vascular: No JVD.   Cardiovascular:      Rate and Rhythm: Normal rate and regular rhythm.      Heart sounds: Normal heart sounds. No murmur heard.    No friction rub. No gallop.   Pulmonary:      Effort: Pulmonary effort is normal. No respiratory distress.      Breath sounds: Normal breath sounds. No wheezing or rales.   Abdominal:      General: Bowel sounds are normal. There is no distension.      Palpations: Abdomen is soft. There is no mass.      Tenderness: There is no abdominal tenderness. There is no guarding.   Musculoskeletal:         General:  Normal range of motion.      Cervical back: Normal range of motion and neck supple.   Skin:     General: Skin is warm and dry.   Neurological:      Mental Status: He is alert and oriented to person, place, and time.   Psychiatric:         Behavior: Behavior normal.         Thought Content: Thought content normal.         Judgment: Judgment normal.          Assessment:       1. Tobacco dependence     2. Other chronic pain     3. Anxiety          Plan:       Tobacco dependence    Other chronic pain    Anxiety    I discussed with the patient that he cannot take pain pills because he is already taking a benzodiazepine every day and there is a interaction between those medications.  He voiced understanding but still argued about why he should also be prescribed pain pills.  He feels that physical therapy is helping him somewhat.  We will taper up his Lyrica.  He will follow-up with Dr. Velázquez in 3 months for his routine visit.     No follow-ups on file.       Karyn Zacarias PA-C   03/24/2022   1:30 PM

## 2022-03-25 DIAGNOSIS — R93.89 ABNORMAL CT OF THE CHEST: Primary | ICD-10-CM

## 2022-03-25 DIAGNOSIS — R91.8 LUNG NODULES: ICD-10-CM

## 2022-03-29 ENCOUNTER — TELEPHONE (OUTPATIENT)
Dept: HEMATOLOGY/ONCOLOGY | Facility: CLINIC | Age: 72
End: 2022-03-29
Payer: MEDICARE

## 2022-03-29 ENCOUNTER — TELEPHONE (OUTPATIENT)
Dept: FAMILY MEDICINE | Facility: CLINIC | Age: 72
End: 2022-03-29
Payer: MEDICARE

## 2022-03-29 NOTE — TELEPHONE ENCOUNTER
Called pt to discuss ct scan results. PET scan is scheduled for 4/14/22 at 10:30am. Cardiology appt was already scheduled for 04/13/22. And I called [facility] pulmonology. They will call the pt to schedule an appt.

## 2022-03-29 NOTE — TELEPHONE ENCOUNTER
----- Message from Kalani Velázquez MD sent at 3/25/2022  6:47 AM CDT -----  Please notify patient that there is abnormal opacities that requires further evaluation, I will refer the patient to see the pulmonologist, please make him an appointment as soon as possible.  Orders are in the system.  There is also presence of COPD.  There is presence of plaque formation in the coronary arteries and in the aorta, please make sure that the patient follow-up with his cardiologist.  There is also recommended to have a PET-CT.  Please schedule the patient for  PET-CT and lump specialist.  Thank you.

## 2022-03-29 NOTE — NURSING
Received notification of referral from Dr. Julien's office. Appointment scheduled for patient to see Dr. Julien in [facility]. Left msg on patient's VM requesting a callback to navigation office to confirm appt. Awaiting return call    Oncology Navigation   Intake  Cancer Type: Thoracic  Internal / External Referral: Internal  Referral Source: EPIC referral from Dr. Velázquez to Pulmonary  Date of Referral: 3/25/2022  Initial Nurse Navigator Contact: 3/29/2022  Referral to Initial Contact Timeline (days): 4  Date Worked: 3/29/2022  First Appointment Available: 4/12/2022  Appointment Date: 4/12/2022  First Available Date vs. Scheduled Date (days): 0  Reason if booked > 7 days after scheduling: Specific provider / access     Treatment                              Acuity      Follow Up  No follow-ups on file.

## 2022-03-29 NOTE — TELEPHONE ENCOUNTER
Pt is stating he feels like he has a cold. His sinuses are messed up and he would like for something to be called in.

## 2022-03-29 NOTE — TELEPHONE ENCOUNTER
He can take over-the-counter antihistamine, nasal saline spray, or Neti pot with sterile water, he can also schedule telemedicine visit/visit for this problem.  Thank you.

## 2022-03-30 ENCOUNTER — TELEPHONE (OUTPATIENT)
Dept: HEMATOLOGY/ONCOLOGY | Facility: CLINIC | Age: 72
End: 2022-03-30
Payer: MEDICARE

## 2022-03-30 NOTE — NURSING
Received return call regarding appt with Dr. Julien. Patient has been scheduled. He requested PET and [facility] appt on the same day. Will adjust schedule and call again to confirm

## 2022-04-01 ENCOUNTER — TELEPHONE (OUTPATIENT)
Dept: HEMATOLOGY/ONCOLOGY | Facility: CLINIC | Age: 72
End: 2022-04-01
Payer: MEDICARE

## 2022-04-01 NOTE — TELEPHONE ENCOUNTER
----- Message from Ariane Church RN sent at 4/1/2022 10:22 AM CDT -----  Regarding: FW: schedule in lung clinic and move PET up to 4/11  Please contact him or his wife and make sure he is aware of his appt for PET and Lung clinic.     Thanks      ----- Message -----  From: Ariane Church RN  Sent: 3/30/2022   8:00 AM CDT  To: Ariane Church RN  Subject: schedule in lung clinic and move PET up to 4#    Attempted to contact pt to confirm appt.  LVM

## 2022-04-04 ENCOUNTER — TELEPHONE (OUTPATIENT)
Dept: HEMATOLOGY/ONCOLOGY | Facility: CLINIC | Age: 72
End: 2022-04-04
Payer: MEDICARE

## 2022-04-04 NOTE — TELEPHONE ENCOUNTER
Called and spoke to pt. Informed pt of next weeks PET and appt with Dr Julien.  Pt said his daughter helps him with his appts.  Explained both appt's here at the cancer center.  Confirmed the location and informed pt I will be sendning appt reminder to him in the mail.    Done.

## 2022-04-08 ENCOUNTER — TELEPHONE (OUTPATIENT)
Dept: HEMATOLOGY/ONCOLOGY | Facility: CLINIC | Age: 72
End: 2022-04-08
Payer: MEDICARE

## 2022-04-08 NOTE — TELEPHONE ENCOUNTER
4/4/22  Called and spoke to pt. Informed pt of next weeks PET and appt with Dr Julien.  Pt said his daughter helps him with his appts.  Explained both appt's here at the cancer center.  Confirmed the location and informed pt I will be sendning appt reminder to him in the mail.    Done.

## 2022-04-10 NOTE — PROGRESS NOTES
[facility]   Initial Office Visit  Chief Complaint   Patient presents with    Consult     Lung Rad4/ Dr. Velázuqez       SUBJECTIVE:     History of Present Illness:  Patient is a 71 y.o. male presents for evaluation. Pulmonary evaluation for abnormal CT chest referred by Dr Kalani Velázquez.    Patient reports 71-year-old male with history of smoking on recent thoracic spinal fusion around June 2020 due to severe progressive myelopathy that had render him non ambulatory and with no antigravity function.  Per report by Dr. Elmer Connell he made a significant recovery even though his note from November 2020 reports unsteady wide based gait    He follows with Dr. Velázquez for high blood pressure and dyslipidemia    Lung cancer screening CT of the chest showed significant upper zone predominant emphysema.    There is an 8 mm irregular opacity left upper lobe inferior segment of the lingula that appears to have a lot of summation imaging and is noted on image 204 of series 4 from March 24, 2022    There is a 3 mm nodule reported on the right lung and there is a left lower lung zone subQ tissue density that appears to correspond to a scar which is unchanged from CT of the chest from August 2004. On that CT of the chest, thickness of the slices was 5 mm and I do not see the lingular nodule.  Patient denies JOSÉ. He dies not take bronchodilators on a regular basis    Exposures: was a  for many years  History of CABG 4 V 2009.    Smoking Hx: from age 14 till present up to 2 PPD. Now reports to 1/2 PPD    Past Medical History:   Diagnosis Date    Depression     Hyperlipidemia 5/8/2019    Hypertension     Seizures      Past Surgical History:   Procedure Laterality Date    CORONARY ARTERY BYPASS GRAFT      LAMINECTOMY OF THORACIC SPINE BY POSTERIOR APPROACH USING COMPUTER-ASSISTED NAVIGATION  5/29/2020    Procedure: LAMINECTOMY, SPINE, THORACIC, POSTERIOR APPROACH, USING COMPUTER-ASSISTED  NAVIGATION T7-9;  Surgeon: Elmer Connell MD;  Location: [facility] OR;  Service: Neurosurgery;;    SURGICAL REMOVAL OF VERTEBRAL BODY OF THORACIC SPINE N/A 5/29/2020    Procedure: CORPECTOMY, SPINE, THORACIC - Partial T8 - T9 TRANSPEDICULAR/ COSTOTRANSVESECTOMY  T9;  Surgeon: Elmer Connell MD;  Location: [facility] OR;  Service: Neurosurgery;  Laterality: N/A;    THORACIC LAMINECTOMY WITH FUSION N/A 5/29/2020    Procedure: LAMINECTOMY, SPINE, THORACIC WITH FUSION- T7-10;  Surgeon: Elmer Connell MD;  Location: [facility] OR;  Service: Neurosurgery;  Laterality: N/A;     Family History   Problem Relation Age of Onset    Diabetes Mother     No Known Problems Father     Cancer Sister         pt does not know what kind    Diabetes Sister     No Known Problems Sister     No Known Problems Sister      Social History     Tobacco Use    Smoking status: Current Every Day Smoker     Packs/day: 1.00     Years: 57.00     Pack years: 57.00    Smokeless tobacco: Never Used    Tobacco comment: Age Started: 14   Substance Use Topics    Alcohol use: Yes     Comment: ocassional         Review of patient's allergies indicates:  No Known Allergies    Current Outpatient Medications on File Prior to Visit   Medication Sig Dispense Refill    acetaminophen (TYLENOL) 325 MG tablet Take 2 tablets (650 mg total) by mouth every 4 (four) hours as needed.  0    albuterol (PROVENTIL/VENTOLIN HFA) 90 mcg/actuation inhaler INHALE 2 PUFFS BY MOUTH INTO THE LUNGS EVERY 6 HOURS AS NEEDED FOR WHEEZING. RESCUE 54 g 3    aspirin (ECOTRIN) 81 MG EC tablet Take 81 mg by mouth once daily.      atenoloL (TENORMIN) 100 MG tablet TAKE 1 TABLET(100 MG) BY MOUTH EVERY DAY 90 tablet 3    azelastine (ASTELIN) 137 mcg (0.1 %) nasal spray 1 spray (137 mcg total) by Nasal route 2 (two) times daily. 90 mL 3    diazePAM (VALIUM) 5 MG tablet Take 1 tablet (5 mg total) by mouth nightly as needed for Anxiety. 30 tablet 2    EScitalopram oxalate (LEXAPRO) 20 MG tablet  Take 1 tablet (20 mg total) by mouth every evening. 90 tablet 3    ferrous sulfate (FEOSOL) 325 mg (65 mg iron) Tab tablet Take 1 tablet (1 each total) by mouth once daily. 90 tablet 3    finasteride (PROSCAR) 5 mg tablet Take 1 tablet (5 mg total) by mouth once daily. 90 tablet 3    fluticasone propionate (FLONASE) 50 mcg/actuation nasal spray SHAKE LIQUID AND USE 1 SPRAY(50 MCG) IN EACH NOSTRIL EVERY DAY 48 g 0    furosemide (LASIX) 20 MG tablet Take 1 tablet (20 mg total) by mouth once daily. 90 tablet 3    HYDROcodone-acetaminophen (NORCO)  mg per tablet Take 1 tablet by mouth every 8 (eight) hours as needed for Pain. 20 tablet 0    linaCLOtide (LINZESS) 145 mcg Cap capsule Take 1 capsule (145 mcg total) by mouth before breakfast. 30 capsule 11    meloxicam (MOBIC) 15 MG tablet Take 1 tablet (15 mg total) by mouth daily as needed for Pain (back pain). 30 tablet 1    montelukast (SINGULAIR) 10 mg tablet TAKE 1 TABLET(10 MG) BY MOUTH EVERY EVENING 90 tablet 3    multivitamin (THERAGRAN) per tablet Take 1 tablet by mouth once daily.      nicotine (NICODERM CQ) 21 mg/24 hr Place 1 patch onto the skin once daily. 14 patch 0    nicotine polacrilex 4 MG Lozg Take up to 6 pieces daily as needed (Patient taking differently: Take up to 6 pieces daily as needed) 72 lozenge 0    potassium chloride SA (K-DUR,KLOR-CON) 20 MEQ tablet Take 1 tablet (20 mEq total) by mouth once daily. 90 tablet 3    pregabalin (LYRICA) 100 MG capsule Take 1 capsule (100 mg total) by mouth 2 (two) times daily. 60 capsule 2    rosuvastatin (CRESTOR) 20 MG tablet Take 1 tablet (20 mg total) by mouth once daily. 90 tablet 3    tadalafiL (CIALIS) 20 MG Tab Take 1 tablet (20 mg total) by mouth daily as needed. 30 tablet 1    telmisartan-hydrochlorothiazide (MICARDIS HCT) 80-12.5 mg per tablet Take 1 tablet by mouth once daily. 90 tablet 3    albuterol-ipratropium (DUO-NEB) 2.5 mg-0.5 mg/3 mL nebulizer solution Take 3 mLs by  "nebulization every 6 (six) hours as needed for Wheezing. Rescue 120 vial 5     No current facility-administered medications on file prior to visit.         Review of Systems     Constitutional: Negative unless otherwise commented above  HENT: Negative unless otherwise commented above  Eyes: Negative unless otherwise commented above  Respiratory: Negative unless otherwise commented above  Cardiovascular: Negative unless otherwise commented above  Musculoskeletal: Negative unless otherwise commented above  Skin: Negative unless otherwise commented above  Neurological: Negative unless otherwise commented above  Hematological: Negative unless otherwise commented above  Endocrine: Negative unless otherwise commented above    OBJECTIVE:     Vital Signs (Most Recent)  Visit Vitals  BP (!) 177/81 (BP Location: Left arm, Patient Position: Sitting)   Pulse (!) 55   Temp 97.6 °F (36.4 °C) (Temporal)   Resp 17   Ht 5' 4" (1.626 m)   Wt 86 kg (189 lb 11.3 oz)   SpO2 97%   BMI 32.56 kg/m²     5' 4" (1.626 m)  86 kg (189 lb 11.3 oz)     Physical Exam:    General:  BMI 31   Eye: Extraocular movements are intact, Normal conjunctiva.  No scleral icterus  HENT: Normocephalic, Oral mucosa is moist, No pharyngeal erythema, clear oropharynx  Neck: Supple, Non-tender, No jugular venous distention.  Respiratory: decreased Bs and mod ronchi bilat   Cardiovascular: RRR   Extremities: no CCE(-)  Gastrointestinal: Soft, Non-tender, Non-distended   Lymphatics: No lymphadenopathy neck, supraclavicular.   Integumentary: Warm, Dry.   Neurologic: Alert, Oriented, Normal motor function, No focal defects.       Diagnostic Results:    PFT Results  pending  No data recorded   No data recorded  No data recorded  No data recorded  No data recorded  No data recorded  No data recorded  No data recorded  No data recorded  No data recorded  No data recorded  SpO2: 97 % (room air)      Significant Imaging:  CXR:  Of CT of the chest reviewed on confirm finding " in the lingula as well as scar in the left lower lung zone.  Right upper lobe nodule reported as 3 mm   Agreed that scar was present left lower lobe area since August 2004. I was not able to find the nodule in the inferior segment of the lingula on the previous CT of the chest from August 2004.  PET CT today obtained. Awaiting official reading but I do not see obvious hypermetabolism in the nodule or mediastinum     Test(s) Reviewed  I have personally reviewed the following in detail:  [x] Chest Xray Images   [x] CT Images   [] Echocardiogram   [] Labs   [] Other:       Assessment:         ICD-10-CM ICD-9-CM   1. Simple chronic bronchitis  J41.0 491.0   2. Lung nodule  R91.1 793.11   3. Coronary artery disease of bypass graft of native heart with stable angina pectoris  I25.708 414.05     413.9   4. Cigarette smoker  F17.210 305.1   5. BMI 32.0-32.9,adult  Z68.32 V85.32        Plan:   Simple chronic bronchitis    Lung nodule    Coronary artery disease of bypass graft of native heart with stable angina pectoris    Cigarette smoker    BMI 32.0-32.9,adult     Obtain Nodify . Estimated risk of malignancy 24%  Nodule inf segment lingula 8mm may be below resolution of PET CT.   I would obtain a repeat CT chest contrast in 3months with high resolution cuts  Smoking cessaion encouraged.  Pt has nebulized bronchodilators but he does not use    No follow-ups on file.     See labs and med orders.    Medications have been reviewed and reconciled.      Portions of this note may have been created with voice recognition software.  Grammatical, syntax and spelling errors may be inevitable.

## 2022-04-12 ENCOUNTER — OFFICE VISIT (OUTPATIENT)
Dept: PULMONOLOGY | Facility: CLINIC | Age: 72
End: 2022-04-12
Payer: MEDICARE

## 2022-04-12 ENCOUNTER — HOSPITAL ENCOUNTER (OUTPATIENT)
Dept: RADIOLOGY | Facility: HOSPITAL | Age: 72
Discharge: HOME | End: 2022-04-12
Attending: FAMILY MEDICINE
Payer: MEDICARE

## 2022-04-12 ENCOUNTER — DOCUMENTATION ONLY (OUTPATIENT)
Dept: HEMATOLOGY/ONCOLOGY | Facility: CLINIC | Age: 72
End: 2022-04-12
Payer: MEDICARE

## 2022-04-12 VITALS
TEMPERATURE: 98 F | RESPIRATION RATE: 17 BRPM | DIASTOLIC BLOOD PRESSURE: 81 MMHG | BODY MASS INDEX: 32.38 KG/M2 | HEIGHT: 64 IN | WEIGHT: 189.69 LBS | OXYGEN SATURATION: 97 % | HEART RATE: 55 BPM | SYSTOLIC BLOOD PRESSURE: 177 MMHG

## 2022-04-12 DIAGNOSIS — E04.1 THYROID NODULE: Primary | ICD-10-CM

## 2022-04-12 DIAGNOSIS — R93.89 ABNORMAL CT OF THE CHEST: ICD-10-CM

## 2022-04-12 DIAGNOSIS — R91.1 LUNG NODULE: ICD-10-CM

## 2022-04-12 DIAGNOSIS — R91.8 LUNG NODULES: ICD-10-CM

## 2022-04-12 DIAGNOSIS — F17.210 CIGARETTE SMOKER: ICD-10-CM

## 2022-04-12 DIAGNOSIS — Z91.89 PULMONARY NODULE LESS THAN 1 CM IN DIAMETER WITH MODERATE TO HIGH RISK FOR MALIGNANT NEOPLASM: ICD-10-CM

## 2022-04-12 DIAGNOSIS — R91.1 PULMONARY NODULE LESS THAN 1 CM IN DIAMETER WITH MODERATE TO HIGH RISK FOR MALIGNANT NEOPLASM: ICD-10-CM

## 2022-04-12 DIAGNOSIS — J41.0 SIMPLE CHRONIC BRONCHITIS: Primary | ICD-10-CM

## 2022-04-12 DIAGNOSIS — I25.708 CORONARY ARTERY DISEASE OF BYPASS GRAFT OF NATIVE HEART WITH STABLE ANGINA PECTORIS: ICD-10-CM

## 2022-04-12 LAB — GLUCOSE SERPL-MCNC: 103 MG/DL (ref 70–110)

## 2022-04-12 PROCEDURE — 99499 RISK ADDL DX/OHS AUDIT: ICD-10-PCS | Mod: S$GLB,,, | Performed by: INTERNAL MEDICINE

## 2022-04-12 PROCEDURE — 1159F MED LIST DOCD IN RCRD: CPT | Mod: CPTII,S$GLB,, | Performed by: INTERNAL MEDICINE

## 2022-04-12 PROCEDURE — 3288F PR FALLS RISK ASSESSMENT DOCUMENTED: ICD-10-PCS | Mod: CPTII,S$GLB,, | Performed by: INTERNAL MEDICINE

## 2022-04-12 PROCEDURE — 3288F FALL RISK ASSESSMENT DOCD: CPT | Mod: CPTII,S$GLB,, | Performed by: INTERNAL MEDICINE

## 2022-04-12 PROCEDURE — 3077F SYST BP >= 140 MM HG: CPT | Mod: CPTII,S$GLB,, | Performed by: INTERNAL MEDICINE

## 2022-04-12 PROCEDURE — 1159F PR MEDICATION LIST DOCUMENTED IN MEDICAL RECORD: ICD-10-PCS | Mod: CPTII,S$GLB,, | Performed by: INTERNAL MEDICINE

## 2022-04-12 PROCEDURE — A9552 F18 FDG: HCPCS | Mod: PN

## 2022-04-12 PROCEDURE — 99499 UNLISTED E&M SERVICE: CPT | Mod: S$GLB,,, | Performed by: INTERNAL MEDICINE

## 2022-04-12 PROCEDURE — 1160F RVW MEDS BY RX/DR IN RCRD: CPT | Mod: CPTII,S$GLB,, | Performed by: INTERNAL MEDICINE

## 2022-04-12 PROCEDURE — 3079F DIAST BP 80-89 MM HG: CPT | Mod: CPTII,S$GLB,, | Performed by: INTERNAL MEDICINE

## 2022-04-12 PROCEDURE — 3079F PR MOST RECENT DIASTOLIC BLOOD PRESSURE 80-89 MM HG: ICD-10-PCS | Mod: CPTII,S$GLB,, | Performed by: INTERNAL MEDICINE

## 2022-04-12 PROCEDURE — 99999 PR PBB SHADOW E&M-EST. PATIENT-LVL IV: CPT | Mod: PBBFAC,,, | Performed by: INTERNAL MEDICINE

## 2022-04-12 PROCEDURE — 1101F PT FALLS ASSESS-DOCD LE1/YR: CPT | Mod: CPTII,S$GLB,, | Performed by: INTERNAL MEDICINE

## 2022-04-12 PROCEDURE — 1126F AMNT PAIN NOTED NONE PRSNT: CPT | Mod: CPTII,S$GLB,, | Performed by: INTERNAL MEDICINE

## 2022-04-12 PROCEDURE — 3008F PR BODY MASS INDEX (BMI) DOCUMENTED: ICD-10-PCS | Mod: CPTII,S$GLB,, | Performed by: INTERNAL MEDICINE

## 2022-04-12 PROCEDURE — 99999 PR PBB SHADOW E&M-EST. PATIENT-LVL IV: ICD-10-PCS | Mod: PBBFAC,,, | Performed by: INTERNAL MEDICINE

## 2022-04-12 PROCEDURE — 99204 OFFICE O/P NEW MOD 45 MIN: CPT | Mod: S$GLB,,, | Performed by: INTERNAL MEDICINE

## 2022-04-12 PROCEDURE — 3077F PR MOST RECENT SYSTOLIC BLOOD PRESSURE >= 140 MM HG: ICD-10-PCS | Mod: CPTII,S$GLB,, | Performed by: INTERNAL MEDICINE

## 2022-04-12 PROCEDURE — 3008F BODY MASS INDEX DOCD: CPT | Mod: CPTII,S$GLB,, | Performed by: INTERNAL MEDICINE

## 2022-04-12 PROCEDURE — 78815 NM PET CT ROUTINE: ICD-10-PCS | Mod: 26,PI,, | Performed by: RADIOLOGY

## 2022-04-12 PROCEDURE — 1126F PR PAIN SEVERITY QUANTIFIED, NO PAIN PRESENT: ICD-10-PCS | Mod: CPTII,S$GLB,, | Performed by: INTERNAL MEDICINE

## 2022-04-12 PROCEDURE — 1160F PR REVIEW ALL MEDS BY PRESCRIBER/CLIN PHARMACIST DOCUMENTED: ICD-10-PCS | Mod: CPTII,S$GLB,, | Performed by: INTERNAL MEDICINE

## 2022-04-12 PROCEDURE — 1101F PR PT FALLS ASSESS DOC 0-1 FALLS W/OUT INJ PAST YR: ICD-10-PCS | Mod: CPTII,S$GLB,, | Performed by: INTERNAL MEDICINE

## 2022-04-12 PROCEDURE — 99204 PR OFFICE/OUTPT VISIT, NEW, LEVL IV, 45-59 MIN: ICD-10-PCS | Mod: S$GLB,,, | Performed by: INTERNAL MEDICINE

## 2022-04-12 PROCEDURE — 78815 PET IMAGE W/CT SKULL-THIGH: CPT | Mod: 26,PI,, | Performed by: RADIOLOGY

## 2022-04-12 NOTE — PROGRESS NOTES
[facility]      THORACIC MULTIDISCIPLINARY TUMOR BOARD  PATIENT REVIEW FORM  ________________________________________________________________________    CLINIC #: [id number]  DATE: 04/12/2022    TUMOR SITE:   Left lung mass    PRESENTER:   Dr. Julien    PATIENT SUMMARY:   Patient who is everyday smoker, alcohol use  3/24/22 lung cancer screening CT revealed calcified granulomas, RUL nodule, 8 mm SOLANGE nodule, some scarring formation, emphysemic changes    BOARD RECOMMENDATIONS:   Follow-up CT in 3 months   Nodify testing          [] [facility] Treatment Guidelines reviewed and care planned is consistent with guidelines.         (i.e., NCCN, NCI, PD, ACO, AUA, etc.)    PRESENTATION AT CANCER CONFERENCE:         [x] Prospective    [] Retrospective     [] Follow-Up          [] Eligible for clinical trial      Ansley Garcia

## 2022-04-12 NOTE — PROGRESS NOTES
PET Imaging Questionnaire    1. Are you a Diabetic? Recent Blood Sugar level? No    2. Are you anemic? Bone Marrow Stimulation Meds? Yes    3. Have you had a CT Scan, if so when & where was your last one? Yes -     4. Have you had a PET Scan, if so when & where was your last one? No    5. Chemotherapy or currently on Chemotherapy? No    6. Radiation therapy? No    Surgical History:   Past Surgical History:   Procedure Laterality Date    CORONARY ARTERY BYPASS GRAFT      LAMINECTOMY OF THORACIC SPINE BY POSTERIOR APPROACH USING COMPUTER-ASSISTED NAVIGATION  5/29/2020    Procedure: LAMINECTOMY, SPINE, THORACIC, POSTERIOR APPROACH, USING COMPUTER-ASSISTED NAVIGATION T7-9;  Surgeon: Elmer Connell MD;  Location: [facility] OR;  Service: Neurosurgery;;    SURGICAL REMOVAL OF VERTEBRAL BODY OF THORACIC SPINE N/A 5/29/2020    Procedure: CORPECTOMY, SPINE, THORACIC - Partial T8 - T9 TRANSPEDICULAR/ COSTOTRANSVESECTOMY  T9;  Surgeon: Elmer Connell MD;  Location: [facility] OR;  Service: Neurosurgery;  Laterality: N/A;    THORACIC LAMINECTOMY WITH FUSION N/A 5/29/2020    Procedure: LAMINECTOMY, SPINE, THORACIC WITH FUSION- T7-10;  Surgeon: Elmer Connell MD;  Location: [facility] OR;  Service: Neurosurgery;  Laterality: N/A;   7.      8. Have you been fasting for at least 6 hours? Yes    9. Is there any chance you may be pregnant or breastfeeding? No    Assay: 12.65 MCi@:8:12   Injection Site:LT AC    Residual: .491 mCi@: 8:14   Technologist: Romeo Millard Injected:12.16mCi

## 2022-04-19 DIAGNOSIS — R91.1 LUNG NODULE: Primary | ICD-10-CM

## 2022-04-21 ENCOUNTER — TELEPHONE (OUTPATIENT)
Dept: GASTROENTEROLOGY | Facility: CLINIC | Age: 72
End: 2022-04-21
Payer: MEDICARE

## 2022-04-21 NOTE — TELEPHONE ENCOUNTER
Spoke with pt. Verified pt still has prep instructions for upcoming procedure with Dr. Zamora. Pt stated they do. Pt informed surgery center will call day or two prior with arrival time. Pt verbalized understanding to all.

## 2022-04-23 ENCOUNTER — NURSE TRIAGE (OUTPATIENT)
Dept: ADMINISTRATIVE | Facility: CLINIC | Age: 72
End: 2022-04-23
Payer: MEDICARE

## 2022-04-23 NOTE — TELEPHONE ENCOUNTER
Pt states he had a colonoscopy rescheduled. New appt set for 4/28/22. Pt attempted to obtain prep today, but was told that script no longer available. No medication prep on file via EMR.   Pt instructed to notify MD Zamora's office Monday.  Verbalizes understanding.    Reason for Disposition   [1] Prescription refill request for NON-ESSENTIAL medicine (i.e., no harm to patient if med not taken) AND [2] triager unable to refill per department policy    Protocols used: MEDICATION REFILL AND RENEWAL CALL-A-AH

## 2022-04-25 ENCOUNTER — TELEPHONE (OUTPATIENT)
Dept: GASTROENTEROLOGY | Facility: CLINIC | Age: 72
End: 2022-04-25
Payer: MEDICARE

## 2022-04-25 ENCOUNTER — PATIENT OUTREACH (OUTPATIENT)
Dept: ADMINISTRATIVE | Facility: OTHER | Age: 72
End: 2022-04-25
Payer: MEDICARE

## 2022-04-25 NOTE — TELEPHONE ENCOUNTER
----- Message from Mila Vergara sent at 4/25/2022  7:44 AM CDT -----  Contact: patient  Type: Needs Medical Advice  Who Called:  patient   Symptoms (please be specific):    How long has patient had these symptoms:    Pharmacy name and phone #:    Best Call Back Number: [phone]  Additional Information: contact patient regarding colonoscopy prep, he states pharmacy will not give it to him

## 2022-04-25 NOTE — TELEPHONE ENCOUNTER
Left message for pt informing his prep is purchased OTC & included what to purchase. Number provided for pt to call back incase he needed me to review how to prep.

## 2022-04-25 NOTE — PROGRESS NOTES
Health Maintenance Due   Topic Date Due    Shingles Vaccine (1 of 2) Never done    Colorectal Cancer Screening  02/20/2016    COVID-19 Vaccine (4 - Booster for Pfizer series) 12/29/2021     Updates were requested from care everywhere.  Chart was reviewed for overdue Proactive [facility] Encounters ([facility]) topics (CRS, Breast Cancer Screening, Eye exam)  Health Maintenance has been updated.  [place] immunization registry triggered.  Immunizations were reconciled.

## 2022-04-25 NOTE — TELEPHONE ENCOUNTER
Spoke to patient to go over prep instructions for procedure with Dr Zamora on Thurs 4/28/22. Asked patient if he was able to write down instructions which he said he could. Magnesium Citrate Prep instructions given to him beginning with 2 bottles of mag citrate and 4 dulcolax tablets which he can buy OTC. Advised of foods he should avoid and to stop aspirin, ibuprofen etc. on day of. Reviewed list of clear liquid that he may have starting Wednesday morning and continuing all day. Nothing red or purple. He was given times to take dulcolax and magnesium citrate and to make sure he is drinking water. Someone from surgery center will call a day or two before with arrive time.

## 2022-04-25 NOTE — TELEPHONE ENCOUNTER
----- Message from Madhavi Reaves MA sent at 4/25/2022  2:55 PM CDT -----  Type: Needs Medical Advice  Who Called:  pt    Best Call Back Number: [phone]    Additional Information: pt is scheduled for lower GI on 4/28--however, pt doesn't have his prep or his instructions--please advise--thank you

## 2022-04-27 ENCOUNTER — OFFICE VISIT (OUTPATIENT)
Dept: CARDIOLOGY | Facility: CLINIC | Age: 72
End: 2022-04-27
Payer: MEDICARE

## 2022-04-27 VITALS
SYSTOLIC BLOOD PRESSURE: 136 MMHG | HEART RATE: 66 BPM | BODY MASS INDEX: 31.27 KG/M2 | HEIGHT: 64 IN | WEIGHT: 183.19 LBS | DIASTOLIC BLOOD PRESSURE: 71 MMHG

## 2022-04-27 DIAGNOSIS — E66.9 OBESITY, CLASS I, BMI 30-34.9: Chronic | ICD-10-CM

## 2022-04-27 DIAGNOSIS — R09.89 BRUIT OF RIGHT CAROTID ARTERY: Chronic | ICD-10-CM

## 2022-04-27 DIAGNOSIS — J43.8 OTHER EMPHYSEMA: Chronic | ICD-10-CM

## 2022-04-27 DIAGNOSIS — Z71.6 ENCOUNTER FOR TOBACCO USE CESSATION COUNSELING: Chronic | ICD-10-CM

## 2022-04-27 DIAGNOSIS — I25.708 CORONARY ARTERY DISEASE OF BYPASS GRAFT OF NATIVE HEART WITH STABLE ANGINA PECTORIS: Primary | Chronic | ICD-10-CM

## 2022-04-27 DIAGNOSIS — E78.5 DYSLIPIDEMIA: Chronic | ICD-10-CM

## 2022-04-27 PROCEDURE — 99214 PR OFFICE/OUTPT VISIT, EST, LEVL IV, 30-39 MIN: ICD-10-PCS | Mod: S$GLB,,, | Performed by: INTERNAL MEDICINE

## 2022-04-27 PROCEDURE — 99214 OFFICE O/P EST MOD 30 MIN: CPT | Mod: S$GLB,,, | Performed by: INTERNAL MEDICINE

## 2022-04-27 PROCEDURE — 1126F AMNT PAIN NOTED NONE PRSNT: CPT | Mod: CPTII,S$GLB,, | Performed by: INTERNAL MEDICINE

## 2022-04-27 PROCEDURE — 3075F PR MOST RECENT SYSTOLIC BLOOD PRESS GE 130-139MM HG: ICD-10-PCS | Mod: CPTII,S$GLB,, | Performed by: INTERNAL MEDICINE

## 2022-04-27 PROCEDURE — 3078F PR MOST RECENT DIASTOLIC BLOOD PRESSURE < 80 MM HG: ICD-10-PCS | Mod: CPTII,S$GLB,, | Performed by: INTERNAL MEDICINE

## 2022-04-27 PROCEDURE — 3008F BODY MASS INDEX DOCD: CPT | Mod: CPTII,S$GLB,, | Performed by: INTERNAL MEDICINE

## 2022-04-27 PROCEDURE — 3075F SYST BP GE 130 - 139MM HG: CPT | Mod: CPTII,S$GLB,, | Performed by: INTERNAL MEDICINE

## 2022-04-27 PROCEDURE — 1101F PT FALLS ASSESS-DOCD LE1/YR: CPT | Mod: CPTII,S$GLB,, | Performed by: INTERNAL MEDICINE

## 2022-04-27 PROCEDURE — 3288F FALL RISK ASSESSMENT DOCD: CPT | Mod: CPTII,S$GLB,, | Performed by: INTERNAL MEDICINE

## 2022-04-27 PROCEDURE — 3078F DIAST BP <80 MM HG: CPT | Mod: CPTII,S$GLB,, | Performed by: INTERNAL MEDICINE

## 2022-04-27 PROCEDURE — 1101F PR PT FALLS ASSESS DOC 0-1 FALLS W/OUT INJ PAST YR: ICD-10-PCS | Mod: CPTII,S$GLB,, | Performed by: INTERNAL MEDICINE

## 2022-04-27 PROCEDURE — 1126F PR PAIN SEVERITY QUANTIFIED, NO PAIN PRESENT: ICD-10-PCS | Mod: CPTII,S$GLB,, | Performed by: INTERNAL MEDICINE

## 2022-04-27 PROCEDURE — 3008F PR BODY MASS INDEX (BMI) DOCUMENTED: ICD-10-PCS | Mod: CPTII,S$GLB,, | Performed by: INTERNAL MEDICINE

## 2022-04-27 PROCEDURE — 3288F PR FALLS RISK ASSESSMENT DOCUMENTED: ICD-10-PCS | Mod: CPTII,S$GLB,, | Performed by: INTERNAL MEDICINE

## 2022-04-27 RX ORDER — ROSUVASTATIN CALCIUM 40 MG/1
40 TABLET, COATED ORAL NIGHTLY
Qty: 90 TABLET | Refills: 1 | Status: SHIPPED | OUTPATIENT
Start: 2022-04-27 | End: 2022-10-19

## 2022-04-27 NOTE — PROGRESS NOTES
Subjective:    Patient ID:  Alexis Medrano Jr. is a 72 y.o. male who presents for Coronary Artery Disease        HPI    NO F/U SINCE 3/2020, LDL 61, HDL 36, DOING OK, SPINAL FUSION IN 5/2020, STILL SMOKES, FOR COLONOSCOPY TOMORROW, NO CHEST PAIN NO CHANGE IN SHORTNESS OF BREATH NO TIA TYPE SYMPTOMS NO NEAR-SYNCOPE , SEE ROS  Past Medical History:   Diagnosis Date    Depression     Hyperlipidemia 5/8/2019    Hypertension     Seizures      Past Surgical History:   Procedure Laterality Date    CORONARY ARTERY BYPASS GRAFT      LAMINECTOMY OF THORACIC SPINE BY POSTERIOR APPROACH USING COMPUTER-ASSISTED NAVIGATION  5/29/2020    Procedure: LAMINECTOMY, SPINE, THORACIC, POSTERIOR APPROACH, USING COMPUTER-ASSISTED NAVIGATION T7-9;  Surgeon: Elmer Connell MD;  Location: [facility] OR;  Service: Neurosurgery;;    SURGICAL REMOVAL OF VERTEBRAL BODY OF THORACIC SPINE N/A 5/29/2020    Procedure: CORPECTOMY, SPINE, THORACIC - Partial T8 - T9 TRANSPEDICULAR/ COSTOTRANSVESECTOMY  T9;  Surgeon: Elmer Connell MD;  Location: [facility] OR;  Service: Neurosurgery;  Laterality: N/A;    THORACIC LAMINECTOMY WITH FUSION N/A 5/29/2020    Procedure: LAMINECTOMY, SPINE, THORACIC WITH FUSION- T7-10;  Surgeon: Elmer Connell MD;  Location: [facility] OR;  Service: Neurosurgery;  Laterality: N/A;     Family History   Problem Relation Age of Onset    Diabetes Mother     No Known Problems Father     Cancer Sister         pt does not know what kind    Diabetes Sister     No Known Problems Sister     No Known Problems Sister      Social History     Socioeconomic History    Marital status:    Tobacco Use    Smoking status: Current Every Day Smoker     Packs/day: 1.00     Years: 57.00     Pack years: 57.00    Smokeless tobacco: Never Used    Tobacco comment: Age Started: 14   Substance and Sexual Activity    Alcohol use: Yes     Comment: ocassional        Review of patient's allergies indicates:  No Known Allergies    Current Outpatient  Medications:     acetaminophen (TYLENOL) 325 MG tablet, Take 2 tablets (650 mg total) by mouth every 4 (four) hours as needed., Disp: , Rfl: 0    albuterol (PROVENTIL/VENTOLIN HFA) 90 mcg/actuation inhaler, INHALE 2 PUFFS BY MOUTH INTO THE LUNGS EVERY 6 HOURS AS NEEDED FOR WHEEZING. RESCUE, Disp: 54 g, Rfl: 3    aspirin (ECOTRIN) 81 MG EC tablet, Take 81 mg by mouth once daily., Disp: , Rfl:     atenoloL (TENORMIN) 100 MG tablet, TAKE 1 TABLET(100 MG) BY MOUTH EVERY DAY, Disp: 90 tablet, Rfl: 3    diazePAM (VALIUM) 5 MG tablet, Take 1 tablet (5 mg total) by mouth nightly as needed for Anxiety., Disp: 30 tablet, Rfl: 2    EScitalopram oxalate (LEXAPRO) 20 MG tablet, Take 1 tablet (20 mg total) by mouth every evening., Disp: 90 tablet, Rfl: 3    ferrous sulfate (FEOSOL) 325 mg (65 mg iron) Tab tablet, Take 1 tablet (1 each total) by mouth once daily., Disp: 90 tablet, Rfl: 3    finasteride (PROSCAR) 5 mg tablet, Take 1 tablet (5 mg total) by mouth once daily., Disp: 90 tablet, Rfl: 3    fluticasone propionate (FLONASE) 50 mcg/actuation nasal spray, SHAKE LIQUID AND USE 1 SPRAY(50 MCG) IN EACH NOSTRIL EVERY DAY, Disp: 48 g, Rfl: 0    furosemide (LASIX) 20 MG tablet, Take 1 tablet (20 mg total) by mouth once daily., Disp: 90 tablet, Rfl: 3    HYDROcodone-acetaminophen (NORCO)  mg per tablet, Take 1 tablet by mouth every 8 (eight) hours as needed for Pain., Disp: 20 tablet, Rfl: 0    linaCLOtide (LINZESS) 145 mcg Cap capsule, Take 1 capsule (145 mcg total) by mouth before breakfast., Disp: 30 capsule, Rfl: 11    meloxicam (MOBIC) 15 MG tablet, Take 1 tablet (15 mg total) by mouth daily as needed for Pain (back pain)., Disp: 30 tablet, Rfl: 1    montelukast (SINGULAIR) 10 mg tablet, TAKE 1 TABLET(10 MG) BY MOUTH EVERY EVENING, Disp: 90 tablet, Rfl: 3    multivitamin (THERAGRAN) per tablet, Take 1 tablet by mouth once daily., Disp: , Rfl:     nicotine (NICODERM CQ) 21 mg/24 hr, Place 1 patch onto the  skin once daily., Disp: 14 patch, Rfl: 0    nicotine polacrilex 4 MG Lozg, Take up to 6 pieces daily as needed (Patient taking differently: Take up to 6 pieces daily as needed), Disp: 72 lozenge, Rfl: 0    potassium chloride SA (K-DUR,KLOR-CON) 20 MEQ tablet, Take 1 tablet (20 mEq total) by mouth once daily., Disp: 90 tablet, Rfl: 3    pregabalin (LYRICA) 100 MG capsule, Take 1 capsule (100 mg total) by mouth 2 (two) times daily., Disp: 60 capsule, Rfl: 2    tadalafiL (CIALIS) 20 MG Tab, Take 1 tablet (20 mg total) by mouth daily as needed., Disp: 30 tablet, Rfl: 1    telmisartan-hydrochlorothiazide (MICARDIS HCT) 80-12.5 mg per tablet, Take 1 tablet by mouth once daily., Disp: 90 tablet, Rfl: 3    albuterol-ipratropium (DUO-NEB) 2.5 mg-0.5 mg/3 mL nebulizer solution, Take 3 mLs by nebulization every 6 (six) hours as needed for Wheezing. Rescue, Disp: 120 vial, Rfl: 5    azelastine (ASTELIN) 137 mcg (0.1 %) nasal spray, 1 spray (137 mcg total) by Nasal route 2 (two) times daily. (Patient not taking: No sig reported), Disp: 90 mL, Rfl: 3    rosuvastatin (CRESTOR) 40 MG Tab, Take 1 tablet (40 mg total) by mouth every evening., Disp: 90 tablet, Rfl: 1  No current facility-administered medications for this visit.    Review of Systems   Constitutional: Negative. Negative for chills, decreased appetite, diaphoresis, fever, malaise/fatigue and night sweats.   HENT: Positive for congestion. Negative for nosebleeds.    Eyes: Negative for blurred vision and visual disturbance.   Cardiovascular: Positive for dyspnea on exertion (MILD). Negative for chest pain, claudication, cyanosis, irregular heartbeat, leg swelling, near-syncope, orthopnea, palpitations, paroxysmal nocturnal dyspnea and syncope.   Respiratory: Negative for cough, hemoptysis and shortness of breath.    Endocrine: Negative.    Hematologic/Lymphatic: Negative for adenopathy. Does not bruise/bleed easily.   Skin: Negative for color change and rash.  "  Musculoskeletal: Positive for back pain (CHRONIC). Negative for falls.   Gastrointestinal: Negative for abdominal pain, dysphagia, melena and nausea.   Genitourinary: Negative for dysuria and flank pain.   Neurological: Negative for brief paralysis, headaches, light-headedness, loss of balance and weakness.   Psychiatric/Behavioral: Negative for altered mental status and depression.   Allergic/Immunologic: Negative.         Objective:      Vitals:    04/27/22 0812   BP: 136/71   Pulse: 66   Weight: 83.1 kg (183 lb 3.2 oz)   Height: 5' 4" (1.626 m)   PainSc: 0-No pain     Body mass index is 31.45 kg/m².    Physical Exam  Constitutional:       Appearance: He is obese.   HENT:      Head: Normocephalic and atraumatic.   Eyes:      General: No scleral icterus.     Extraocular Movements: Extraocular movements intact.   Neck:      Vascular: Carotid bruit present. No JVD.   Cardiovascular:      Rate and Rhythm: Normal rate and regular rhythm.  No extrasystoles are present.     Pulses:           Carotid pulses are 2+ on the right side with bruit and 2+ on the left side.       Radial pulses are 2+ on the right side and 2+ on the left side.        Posterior tibial pulses are 2+ on the right side and 2+ on the left side.      Heart sounds: Murmur heard.    Systolic murmur is present with a grade of 1/6 at the lower left sternal border.    No friction rub. No gallop.   Pulmonary:      Effort: Pulmonary effort is normal.      Breath sounds: Wheezing present. No rales.   Abdominal:      Palpations: Abdomen is soft.      Tenderness: There is no abdominal tenderness.   Musculoskeletal:      Cervical back: Neck supple.      Right lower leg: No edema.      Left lower leg: No edema.   Skin:     General: Skin is warm and dry.      Capillary Refill: Capillary refill takes less than 2 seconds.   Neurological:      General: No focal deficit present.      Mental Status: He is alert and oriented to person, place, and time.      Cranial " Nerves: Cranial nerves are intact. No cranial nerve deficit.   Psychiatric:         Mood and Affect: Mood normal.         Speech: Speech normal.         Behavior: Behavior normal.                 ..    Chemistry        Component Value Date/Time     09/29/2021 1152    K 4.2 09/29/2021 1152     09/29/2021 1152    CO2 22 (L) 09/29/2021 1152    BUN 12 09/29/2021 1152    CREATININE 1.0 09/29/2021 1152    GLU 92 09/29/2021 1152        Component Value Date/Time    CALCIUM 10.1 09/29/2021 1152    ALKPHOS 59 09/29/2021 1152    AST 22 09/29/2021 1152    ALT 21 09/29/2021 1152    BILITOT 0.7 09/29/2021 1152    ESTGFRAFRICA >60.0 09/29/2021 1152    EGFRNONAA >60.0 09/29/2021 1152            ..  Lab Results   Component Value Date    CHOL 111 (L) 09/29/2021    CHOL 133 03/13/2020    CHOL 148 11/05/2019     Lab Results   Component Value Date    HDL 36 (L) 09/29/2021    HDL 40 03/13/2020    HDL 43 11/05/2019     Lab Results   Component Value Date    LDLCALC 60.8 (L) 09/29/2021    LDLCALC 76.0 03/13/2020    LDLCALC 91.6 11/05/2019     Lab Results   Component Value Date    TRIG 71 09/29/2021    TRIG 85 03/13/2020    TRIG 67 11/05/2019     Lab Results   Component Value Date    CHOLHDL 32.4 09/29/2021    CHOLHDL 30.1 03/13/2020    CHOLHDL 29.1 11/05/2019     ..  Lab Results   Component Value Date    WBC 10.52 03/10/2022    HGB 15.1 03/10/2022    HCT 46.3 03/10/2022    MCV 88 03/10/2022     03/10/2022       Test(s) Reviewed  I have reviewed the following in detail:  [] Stress test   [] Angiography   [] Echocardiogram   [] Labs   [] Other:       Assessment:         ICD-10-CM ICD-9-CM   1. Coronary artery disease of bypass graft of native heart with stable angina pectoris  I25.708 414.05     413.9   2. Bruit of right carotid artery  R09.89 785.9   3. Dyslipidemia  E78.5 272.4   4. Encounter for tobacco use cessation counseling  Z71.6 V65.42   5. Other emphysema  J43.8 492.8   6. Obesity, Class I, BMI 30-34.9  E66.9  278.00     Problem List Items Addressed This Visit        Pulmonary    Chronic obstructive pulmonary disease       Cardiac/Vascular    Dyslipidemia    Coronary artery disease of bypass graft of native heart with stable angina pectoris - Primary    Relevant Orders    Echo    Bruit of right carotid artery    Relevant Orders    CV Ultrasound Bilateral Doppler Carotid       Endocrine    Obesity, Class I, BMI 30-34.9       Other    Encounter for tobacco use cessation counseling    Relevant Orders    Ambulatory referral/consult to Smoking Cessation Program           Plan:         INCREASE CRESTOR TO 40 MG TARGET LDL LOWER, CHECK, ECHO, CAROTID ULTRASOUND, WATCH BLOOD PRESSURE,ALL CV CLINICALLY STABLE, CLASS 1 ANGINA, NO HF, NO TIA, NO CLINICAL ARRHYTHMIA,CONTINUE CURRENT MEDS, EDUCATION, DIET, EXERCISE, WEIGHT LOSS, INCREASED CV RISK WITH COPD, RETURN TO CLINIC IN 1 YEAR  Coronary artery disease of bypass graft of native heart with stable angina pectoris  Comments:  STABLE  Orders:  -     Echo    Bruit of right carotid artery  Comments:  CAROTID ULTRASOUND  Orders:  -     CV Ultrasound Bilateral Doppler Carotid; Future    Dyslipidemia  Comments:  ADJUST MEDS    Encounter for tobacco use cessation counseling  Comments:  COUNSELING  Orders:  -     Ambulatory referral/consult to Smoking Cessation Program; Future; Expected date: 05/04/2022    Other emphysema    Obesity, Class I, BMI 30-34.9  Comments:  WEIGHT LOSS    Other orders  -     rosuvastatin (CRESTOR) 40 MG Tab; Take 1 tablet (40 mg total) by mouth every evening.  Dispense: 90 tablet; Refill: 1    RTC Low level/low impact aerobic exercise 5x's/wk. Heart healthy diet and risk factor modification.    See labs and med orders.    Aerobic exercise 5x's/wk. Heart healthy diet and risk factor modification.    See labs and med orders.

## 2022-04-28 ENCOUNTER — HOSPITAL ENCOUNTER (OUTPATIENT)
Facility: HOSPITAL | Age: 72
Discharge: HOME | End: 2022-04-28
Attending: INTERNAL MEDICINE | Admitting: INTERNAL MEDICINE
Payer: MEDICARE

## 2022-04-28 ENCOUNTER — IMMUNIZATION (OUTPATIENT)
Dept: FAMILY MEDICINE | Facility: CLINIC | Age: 72
End: 2022-04-28
Payer: MEDICARE

## 2022-04-28 ENCOUNTER — ANESTHESIA (OUTPATIENT)
Dept: ENDOSCOPY | Facility: HOSPITAL | Age: 72
End: 2022-04-28
Payer: MEDICARE

## 2022-04-28 ENCOUNTER — ANESTHESIA EVENT (OUTPATIENT)
Dept: ENDOSCOPY | Facility: HOSPITAL | Age: 72
End: 2022-04-28
Payer: MEDICARE

## 2022-04-28 VITALS
BODY MASS INDEX: 31.58 KG/M2 | SYSTOLIC BLOOD PRESSURE: 128 MMHG | DIASTOLIC BLOOD PRESSURE: 70 MMHG | TEMPERATURE: 98 F | WEIGHT: 185 LBS | RESPIRATION RATE: 18 BRPM | HEIGHT: 64 IN | OXYGEN SATURATION: 97 % | HEART RATE: 76 BPM

## 2022-04-28 DIAGNOSIS — Z12.11 SCREEN FOR COLON CANCER: ICD-10-CM

## 2022-04-28 DIAGNOSIS — Z23 NEED FOR VACCINATION: Primary | ICD-10-CM

## 2022-04-28 PROCEDURE — D9220A PRA ANESTHESIA: Mod: ANES,,, | Performed by: ANESTHESIOLOGY

## 2022-04-28 PROCEDURE — 37000009 HC ANESTHESIA EA ADD 15 MINS: Mod: PO | Performed by: INTERNAL MEDICINE

## 2022-04-28 PROCEDURE — 91305 COVID-19, MRNA, LNP-S, PF, 30 MCG/0.3 ML DOSE VACCINE (PFIZER): CPT | Mod: PBBFAC | Performed by: RADIOLOGY

## 2022-04-28 PROCEDURE — D9220A PRA ANESTHESIA: ICD-10-PCS | Mod: CRNA,,, | Performed by: NURSE ANESTHETIST, CERTIFIED REGISTERED

## 2022-04-28 PROCEDURE — 25000003 PHARM REV CODE 250: Mod: PO | Performed by: NURSE ANESTHETIST, CERTIFIED REGISTERED

## 2022-04-28 PROCEDURE — 37000008 HC ANESTHESIA 1ST 15 MINUTES: Mod: PO | Performed by: INTERNAL MEDICINE

## 2022-04-28 PROCEDURE — D9220A PRA ANESTHESIA: ICD-10-PCS | Mod: ANES,,, | Performed by: ANESTHESIOLOGY

## 2022-04-28 PROCEDURE — D9220A PRA ANESTHESIA: Mod: CRNA,,, | Performed by: NURSE ANESTHETIST, CERTIFIED REGISTERED

## 2022-04-28 PROCEDURE — G0121 COLON CA SCRN NOT HI RSK IND: HCPCS | Mod: PO | Performed by: INTERNAL MEDICINE

## 2022-04-28 PROCEDURE — 63600175 PHARM REV CODE 636 W HCPCS: Mod: PO | Performed by: INTERNAL MEDICINE

## 2022-04-28 PROCEDURE — G0121 COLON CA SCRN NOT HI RSK IND: HCPCS | Mod: ,,, | Performed by: INTERNAL MEDICINE

## 2022-04-28 PROCEDURE — 63600175 PHARM REV CODE 636 W HCPCS: Mod: PO | Performed by: NURSE ANESTHETIST, CERTIFIED REGISTERED

## 2022-04-28 PROCEDURE — G0121 COLON CA SCRN NOT HI RSK IND: ICD-10-PCS | Mod: ,,, | Performed by: INTERNAL MEDICINE

## 2022-04-28 RX ORDER — PROPOFOL 10 MG/ML
VIAL (ML) INTRAVENOUS
Status: DISCONTINUED | OUTPATIENT
Start: 2022-04-28 | End: 2022-04-28

## 2022-04-28 RX ORDER — SODIUM CHLORIDE 0.9 % (FLUSH) 0.9 %
10 SYRINGE (ML) INJECTION
Status: DISCONTINUED | OUTPATIENT
Start: 2022-04-28 | End: 2022-04-28 | Stop reason: HOSPADM

## 2022-04-28 RX ORDER — SODIUM CHLORIDE, SODIUM LACTATE, POTASSIUM CHLORIDE, CALCIUM CHLORIDE 600; 310; 30; 20 MG/100ML; MG/100ML; MG/100ML; MG/100ML
INJECTION, SOLUTION INTRAVENOUS CONTINUOUS
Status: DISCONTINUED | OUTPATIENT
Start: 2022-04-28 | End: 2022-04-28 | Stop reason: HOSPADM

## 2022-04-28 RX ORDER — LIDOCAINE HYDROCHLORIDE 20 MG/ML
INJECTION INTRAVENOUS
Status: DISCONTINUED | OUTPATIENT
Start: 2022-04-28 | End: 2022-04-28

## 2022-04-28 RX ADMIN — PROPOFOL 30 MG: 10 INJECTION, EMULSION INTRAVENOUS at 12:04

## 2022-04-28 RX ADMIN — PROPOFOL 120 MG: 10 INJECTION, EMULSION INTRAVENOUS at 12:04

## 2022-04-28 RX ADMIN — LIDOCAINE HYDROCHLORIDE 75 MG: 20 INJECTION, SOLUTION INTRAVENOUS at 12:04

## 2022-04-28 RX ADMIN — SODIUM CHLORIDE, SODIUM LACTATE, POTASSIUM CHLORIDE, AND CALCIUM CHLORIDE: .6; .31; .03; .02 INJECTION, SOLUTION INTRAVENOUS at 11:04

## 2022-04-28 NOTE — H&P
History & Physical - Short Stay  Gastroenterology      SUBJECTIVE:     Procedure: Colonoscopy    Chief Complaint/Indication for Procedure: Screening    PTA Medications   Medication Sig    acetaminophen (TYLENOL) 325 MG tablet Take 2 tablets (650 mg total) by mouth every 4 (four) hours as needed.    albuterol (PROVENTIL/VENTOLIN HFA) 90 mcg/actuation inhaler INHALE 2 PUFFS BY MOUTH INTO THE LUNGS EVERY 6 HOURS AS NEEDED FOR WHEEZING. RESCUE    albuterol-ipratropium (DUO-NEB) 2.5 mg-0.5 mg/3 mL nebulizer solution Take 3 mLs by nebulization every 6 (six) hours as needed for Wheezing. Rescue    aspirin (ECOTRIN) 81 MG EC tablet Take 81 mg by mouth once daily.    atenoloL (TENORMIN) 100 MG tablet TAKE 1 TABLET(100 MG) BY MOUTH EVERY DAY    diazePAM (VALIUM) 5 MG tablet Take 1 tablet (5 mg total) by mouth nightly as needed for Anxiety.    EScitalopram oxalate (LEXAPRO) 20 MG tablet Take 1 tablet (20 mg total) by mouth every evening.    ferrous sulfate (FEOSOL) 325 mg (65 mg iron) Tab tablet Take 1 tablet (1 each total) by mouth once daily.    finasteride (PROSCAR) 5 mg tablet Take 1 tablet (5 mg total) by mouth once daily.    fluticasone propionate (FLONASE) 50 mcg/actuation nasal spray SHAKE LIQUID AND USE 1 SPRAY(50 MCG) IN EACH NOSTRIL EVERY DAY    furosemide (LASIX) 20 MG tablet Take 1 tablet (20 mg total) by mouth once daily.    HYDROcodone-acetaminophen (NORCO)  mg per tablet Take 1 tablet by mouth every 8 (eight) hours as needed for Pain.    linaCLOtide (LINZESS) 145 mcg Cap capsule Take 1 capsule (145 mcg total) by mouth before breakfast.    meloxicam (MOBIC) 15 MG tablet Take 1 tablet (15 mg total) by mouth daily as needed for Pain (back pain).    montelukast (SINGULAIR) 10 mg tablet TAKE 1 TABLET(10 MG) BY MOUTH EVERY EVENING    multivitamin (THERAGRAN) per tablet Take 1 tablet by mouth once daily.    nicotine (NICODERM CQ) 21 mg/24 hr Place 1 patch onto the skin once daily.    potassium  chloride SA (K-DUR,KLOR-CON) 20 MEQ tablet Take 1 tablet (20 mEq total) by mouth once daily.    pregabalin (LYRICA) 100 MG capsule Take 1 capsule (100 mg total) by mouth 2 (two) times daily.    tadalafiL (CIALIS) 20 MG Tab Take 1 tablet (20 mg total) by mouth daily as needed.    telmisartan-hydrochlorothiazide (MICARDIS HCT) 80-12.5 mg per tablet Take 1 tablet by mouth once daily.    azelastine (ASTELIN) 137 mcg (0.1 %) nasal spray 1 spray (137 mcg total) by Nasal route 2 (two) times daily. (Patient not taking: No sig reported)    nicotine polacrilex 4 MG Lozg Take up to 6 pieces daily as needed (Patient taking differently: Take up to 6 pieces daily as needed)    rosuvastatin (CRESTOR) 40 MG Tab Take 1 tablet (40 mg total) by mouth every evening.       Review of patient's allergies indicates:  No Known Allergies     Past Medical History:   Diagnosis Date    Depression     Hyperlipidemia 5/8/2019    Hypertension     Seizures      Past Surgical History:   Procedure Laterality Date    CORONARY ARTERY BYPASS GRAFT      LAMINECTOMY OF THORACIC SPINE BY POSTERIOR APPROACH USING COMPUTER-ASSISTED NAVIGATION  5/29/2020    Procedure: LAMINECTOMY, SPINE, THORACIC, POSTERIOR APPROACH, USING COMPUTER-ASSISTED NAVIGATION T7-9;  Surgeon: Elmer Connell MD;  Location: [facility] OR;  Service: Neurosurgery;;    SURGICAL REMOVAL OF VERTEBRAL BODY OF THORACIC SPINE N/A 5/29/2020    Procedure: CORPECTOMY, SPINE, THORACIC - Partial T8 - T9 TRANSPEDICULAR/ COSTOTRANSVESECTOMY  T9;  Surgeon: Elmer Connell MD;  Location: [facility] OR;  Service: Neurosurgery;  Laterality: N/A;    THORACIC LAMINECTOMY WITH FUSION N/A 5/29/2020    Procedure: LAMINECTOMY, SPINE, THORACIC WITH FUSION- T7-10;  Surgeon: Elmer Connell MD;  Location: [facility] OR;  Service: Neurosurgery;  Laterality: N/A;     Family History   Problem Relation Age of Onset    Diabetes Mother     No Known Problems Father     Cancer Sister         pt does not know what kind     Diabetes Sister     No Known Problems Sister     No Known Problems Sister      Social History     Tobacco Use    Smoking status: Current Every Day Smoker     Packs/day: 1.00     Years: 57.00     Pack years: 57.00    Smokeless tobacco: Never Used    Tobacco comment: Age Started: 14   Substance Use Topics    Alcohol use: Yes     Comment: ocassional          OBJECTIVE:     Vital Signs (Most Recent)       Physical Exam                                                        GENERAL:  Comfortable, in no acute distress.                                 HEENT EXAM:  Nonicteric.  No adenopathy.  Oropharynx is clear.               NECK:  Supple.                                                               LUNGS:  Clear.                                                               CARDIAC:  Regular rate and rhythm.  S1, S2.  No murmur.                      ABDOMEN:  Soft, positive bowel sounds, nontender.  No hepatosplenomegaly or masses.  No rebound or guarding.                                             EXTREMITIES:  No edema.     MENTAL STATUS:  Normal, alert and oriented.      ASSESSMENT/PLAN:     Assessment: Colorectal cancer screening    Plan: Colonoscopy    Anesthesia Plan: General    ASA Grade: ASA 2 - Patient with mild systemic disease with no functional limitations    MALLAMPATI SCORE:  I (soft palate, uvula, fauces, and tonsillar pillars visible)

## 2022-04-28 NOTE — TRANSFER OF CARE
"Anesthesia Transfer of Care Note    Patient: Alexis Medrano Jr.    Procedure(s) Performed: Procedure(s) (LRB):  COLONOSCOPY (N/A)    Patient location: PACU    Anesthesia Type: general    Transport from OR: Transported from OR on room air with adequate spontaneous ventilation    Post pain: adequate analgesia    Post assessment: no apparent anesthetic complications    Post vital signs: stable    Level of consciousness: awake and sedated    Nausea/Vomiting: no nausea/vomiting    Complications: none    Transfer of care protocol was followed      Last vitals:   Visit Vitals  /70   Pulse 76   Temp 36.7 °C (98.1 °F)   Resp 18   Ht 5' 4" (1.626 m)   Wt 83.9 kg (185 lb)   SpO2 97%   BMI 31.76 kg/m²     "

## 2022-04-28 NOTE — PROVATION PATIENT INSTRUCTIONS
Discharge Summary/Instructions after an Endoscopic Procedure  Patient Name: Alexis Medrano  Patient MRN: [id number]  Patient YOB: 1950 Thursday, April 28, 2022  Jordy Zamora MD  Dear patient,  As a result of recent federal legislation (The Federal Cures Act), you may   receive lab or pathology results from your procedure in your [facility]   account before your physician is able to contact you. Your physician or   their representative will relay the results to you with their   recommendations at their soonest availability.  Thank you,  RESTRICTIONS:  During your procedure today, you received medications for sedation.  These   medications may affect your judgment, balance and coordination.  Therefore,   for 24 hours, you have the following restrictions:   - DO NOT drive a car, operate machinery, make legal/financial decisions,   sign important papers or drink alcohol.    ACTIVITY:  Today: no heavy lifting, straining or running due to procedural   sedation/anesthesia.  The following day: return to full activity including work.  DIET:  Eat and drink normally unless instructed otherwise.     TREATMENT FOR COMMON SIDE EFFECTS:  - Mild abdominal pain, nausea, belching, bloating or excessive gas:  rest,   eat lightly and use a heating pad.  - Sore Throat: treat with throat lozenges and/or gargle with warm salt   water.  - Because air was used during the procedure, expelling large amounts of air   from your rectum or belching is normal.  - If a bowel prep was taken, you may not have a bowel movement for 1-3 days.    This is normal.  SYMPTOMS TO WATCH FOR AND REPORT TO YOUR PHYSICIAN:  1. Abdominal pain or bloating, other than gas cramps.  2. Chest pain.  3. Back pain.  4. Signs of infection such as: chills or fever occurring within 24 hours   after the procedure.  5. Rectal bleeding, which would show as bright red, maroon, or black stools.   (A tablespoon of blood from the rectum is not serious, especially  if   hemorrhoids are present.)  6. Vomiting.  7. Weakness or dizziness.  GO DIRECTLY TO THE NEAREST EMERGENCY ROOM IF YOU HAVE ANY OF THE FOLLOWING:      Difficulty breathing              Chills and/or fever over 101 F   Persistent vomiting and/or vomiting blood   Severe abdominal pain   Severe chest pain   Black, tarry stools   Bleeding- more than one tablespoon   Any other symptom or condition that you feel may need urgent attention  Your doctor recommends these additional instructions:  If any biopsies were taken, your doctors clinic will contact you in 1 to 2   weeks with any results.  Your physician has indicated that a repeat colonoscopy is not recommended   for screening purposes.   You are being discharged to home.  For questions, problems or results please call your physician - Jordy Zamora MD at Work:  [phone].  EMERGENCY PHONE NUMBER: [phone], LAB RESULTS: [phone]  IF A COMPLICATION OR EMERGENCY SITUATION ARISES AND YOU ARE UNABLE TO REACH   YOUR PHYSICIAN - GO DIRECTLY TO THE EMERGENCY ROOM.  ___________________________________________  Nurse Signature  ___________________________________________  Patient/Designated Responsible Party Signature  Jordy Zamora MD  4/28/2022 12:16:25 PM  This report has been verified and signed electronically.  Dear patient,  As a result of recent federal legislation (The Federal Cures Act), you may   receive lab or pathology results from your procedure in your [facility]   account before your physician is able to contact you. Your physician or   their representative will relay the results to you with their   recommendations at their soonest availability.  Thank you.  PROVATION

## 2022-04-28 NOTE — DISCHARGE SUMMARY
[place] - Endoscopy  Discharge Note  Short Stay    Discharge Note  Short Stay      SUMMARY     Admit Date: 4/28/2022    Attending Physician: Jordy Zamora MD     Discharge Physician: Jordy Zamora MD    Discharge Date: 4/28/2022 12:17 PM    Final Diagnosis: Screening [Z13.9]    Disposition: HOME OR SELF CARE    Patient Instructions:   Current Discharge Medication List      CONTINUE these medications which have NOT CHANGED    Details   acetaminophen (TYLENOL) 325 MG tablet Take 2 tablets (650 mg total) by mouth every 4 (four) hours as needed.  Refills: 0      albuterol (PROVENTIL/VENTOLIN HFA) 90 mcg/actuation inhaler INHALE 2 PUFFS BY MOUTH INTO THE LUNGS EVERY 6 HOURS AS NEEDED FOR WHEEZING. RESCUE  Qty: 54 g, Refills: 3    Comments: Please inactivate all prior scripts with same name and strength including on holds.  Associated Diagnoses: Chronic obstructive pulmonary disease, unspecified COPD type      albuterol-ipratropium (DUO-NEB) 2.5 mg-0.5 mg/3 mL nebulizer solution Take 3 mLs by nebulization every 6 (six) hours as needed for Wheezing. Rescue  Qty: 120 vial, Refills: 5      aspirin (ECOTRIN) 81 MG EC tablet Take 81 mg by mouth once daily.      atenoloL (TENORMIN) 100 MG tablet TAKE 1 TABLET(100 MG) BY MOUTH EVERY DAY  Qty: 90 tablet, Refills: 3    Associated Diagnoses: Essential hypertension      diazePAM (VALIUM) 5 MG tablet Take 1 tablet (5 mg total) by mouth nightly as needed for Anxiety.  Qty: 30 tablet, Refills: 2    Associated Diagnoses: Anxiety; S/P lumbar spine operation      EScitalopram oxalate (LEXAPRO) 20 MG tablet Take 1 tablet (20 mg total) by mouth every evening.  Qty: 90 tablet, Refills: 3    Comments: Please inactivate all prior scripts with same name and strength including on holds.  Associated Diagnoses: Anxiety      ferrous sulfate (FEOSOL) 325 mg (65 mg iron) Tab tablet Take 1 tablet (1 each total) by mouth once daily.  Qty: 90 tablet, Refills: 3    Associated Diagnoses:  Iron deficiency      finasteride (PROSCAR) 5 mg tablet Take 1 tablet (5 mg total) by mouth once daily.  Qty: 90 tablet, Refills: 3      fluticasone propionate (FLONASE) 50 mcg/actuation nasal spray SHAKE LIQUID AND USE 1 SPRAY(50 MCG) IN EACH NOSTRIL EVERY DAY  Qty: 48 g, Refills: 0    Comments: Please inactivate all prior scripts with same name and strength including on holds.  Associated Diagnoses: Non-seasonal allergic rhinitis due to pollen      furosemide (LASIX) 20 MG tablet Take 1 tablet (20 mg total) by mouth once daily.  Qty: 90 tablet, Refills: 3    Associated Diagnoses: Localized edema      HYDROcodone-acetaminophen (NORCO)  mg per tablet Take 1 tablet by mouth every 8 (eight) hours as needed for Pain.  Qty: 20 tablet, Refills: 0    Comments: Quantity prescribed more than 7 day supply? No  Associated Diagnoses: Chronic bilateral low back pain without sciatica      linaCLOtide (LINZESS) 145 mcg Cap capsule Take 1 capsule (145 mcg total) by mouth before breakfast.  Qty: 30 capsule, Refills: 11    Associated Diagnoses: Constipation, unspecified constipation type      meloxicam (MOBIC) 15 MG tablet Take 1 tablet (15 mg total) by mouth daily as needed for Pain (back pain).  Qty: 30 tablet, Refills: 1    Associated Diagnoses: Chronic bilateral low back pain without sciatica      montelukast (SINGULAIR) 10 mg tablet TAKE 1 TABLET(10 MG) BY MOUTH EVERY EVENING  Qty: 90 tablet, Refills: 3    Comments: **Patient requests 90 days supply**  Associated Diagnoses: Chronic obstructive pulmonary disease, unspecified COPD type      multivitamin (THERAGRAN) per tablet Take 1 tablet by mouth once daily.      nicotine (NICODERM CQ) 21 mg/24 hr Place 1 patch onto the skin once daily.  Qty: 14 patch, Refills: 0    Comments: [id number]  Associated Diagnoses: Tobacco use disorder      potassium chloride SA (K-DUR,KLOR-CON) 20 MEQ tablet Take 1 tablet (20 mEq total) by mouth once daily.  Qty: 90 tablet, Refills: 3     Comments: Please inactivate all prior scripts with same name and strength including on holds.  Associated Diagnoses: Localized edema      pregabalin (LYRICA) 100 MG capsule Take 1 capsule (100 mg total) by mouth 2 (two) times daily.  Qty: 60 capsule, Refills: 2    Associated Diagnoses: Lumbar radiculopathy      tadalafiL (CIALIS) 20 MG Tab Take 1 tablet (20 mg total) by mouth daily as needed.  Qty: 30 tablet, Refills: 1    Associated Diagnoses: Other male erectile dysfunction      telmisartan-hydrochlorothiazide (MICARDIS HCT) 80-12.5 mg per tablet Take 1 tablet by mouth once daily.  Qty: 90 tablet, Refills: 3    Associated Diagnoses: Essential hypertension      azelastine (ASTELIN) 137 mcg (0.1 %) nasal spray 1 spray (137 mcg total) by Nasal route 2 (two) times daily.  Qty: 90 mL, Refills: 3    Associated Diagnoses: Runny nose      nicotine polacrilex 4 MG Lozg Take up to 6 pieces daily as needed  Qty: 72 lozenge, Refills: 0    Comments: [id number]  Associated Diagnoses: Tobacco use disorder      rosuvastatin (CRESTOR) 40 MG Tab Take 1 tablet (40 mg total) by mouth every evening.  Qty: 90 tablet, Refills: 1             Discharge Procedure Orders (must include Diet, Follow-up, Activity)    Follow Up:  Follow up with PCP as previously scheduled  Resume routine diet.  Activity as tolerated.    No driving day of procedure.

## 2022-04-28 NOTE — ANESTHESIA PREPROCEDURE EVALUATION
04/28/2022  Alexis Medrano Jr. is a 72 y.o., male.      Pre-op Assessment    I have reviewed the Patient Summary Reports.     I have reviewed the Nursing Notes.       Review of Systems  Anesthesia Hx:  No problems with previous Anesthesia    Cardiovascular:   Hypertension CAD  Dysrhythmias  Angina    Pulmonary:   COPD    Neurological:   Neuromuscular Disease, Seizures    Psych:   Psychiatric History          Physical Exam  General: Well nourished        Anesthesia Plan  Type of Anesthesia, risks & benefits discussed:    Anesthesia Type: Gen Natural Airway  Intra-op Monitoring Plan: Standard ASA Monitors  Induction:  IV  Informed Consent: Informed consent signed with the Patient and all parties understand the risks and agree with anesthesia plan.  All questions answered.   ASA Score: 3  Day of Surgery Review of History & Physical: H&P Update referred to the surgeon/provider.    Ready For Surgery From Anesthesia Perspective.     .

## 2022-05-02 DIAGNOSIS — I10 ESSENTIAL HYPERTENSION: ICD-10-CM

## 2022-05-02 DIAGNOSIS — N52.8 OTHER MALE ERECTILE DYSFUNCTION: ICD-10-CM

## 2022-05-02 NOTE — TELEPHONE ENCOUNTER
----- Message from Radha Box sent at 5/2/2022  1:34 PM CDT -----  Contact: pt  Who Called: PT  Regarding: The pt is requesting for his telmisartan-hydrochlorothiazide (MICARDIS HCT) 80-12.5 mg per tablet to be called into the pharmacy and is requesting a callback.   Would the patient rather a call back or a response via [facility]? Call back  Best Call Back Number:   Additional Information: [phone]

## 2022-05-03 NOTE — TELEPHONE ENCOUNTER
----- Message from Ty Castillo sent at 5/3/2022  2:56 PM CDT -----  Type:  RX Refill Request    Who Called:  Pt  Refill or New Rx:  refill  RX Name and Strength:  telmisartan-hydrochlorothiazide (MICARDIS HCT) 80-12.5 mg per tablet  How is the patient currently taking it? (ex. 1XDay):  ad directed  Is this a 30 day or 90 day RX:  30  Preferred Pharmacy with phone number:    [id number] [address] [facility]  [address] [facility] [address]  Phone: [phone] Fax: [phone]      Local or Mail Order:  local  Ordering Provider:    Frank Call Back Number:  [phone] (  Additional Information: Pt would also like a call back in regards to a colonoscopy.   Please advise -- Thank you

## 2022-05-04 RX ORDER — TELMISARTAN AND HYDROCHLORTHIAZIDE 80; 12.5 MG/1; MG/1
1 TABLET ORAL DAILY
Qty: 90 TABLET | Refills: 3 | Status: SHIPPED | OUTPATIENT
Start: 2022-05-04 | End: 2022-08-08 | Stop reason: SDUPTHER

## 2022-05-04 NOTE — TELEPHONE ENCOUNTER
Refill Routing Note   Medication(s) are not appropriate for processing by [facility] for the following reason(s):      - Patient has been seen in the ED/Hospital since the last PCP visit    [facility] action(s):  Route          Medication reconciliation completed: No     Appointments  past 12m or future 3m with PCP    Date Provider   Last Visit   3/10/2022 Kalani Velázquez MD   Next Visit   6/13/2022 Kalani Velázquez MD   ED visits in past 90 days: 0        Note composed:11:39 PM 05/03/2022

## 2022-05-05 ENCOUNTER — CLINICAL SUPPORT (OUTPATIENT)
Dept: SMOKING CESSATION | Facility: CLINIC | Age: 72
End: 2022-05-05
Payer: COMMERCIAL

## 2022-05-05 DIAGNOSIS — F17.200 NICOTINE DEPENDENCE: Primary | ICD-10-CM

## 2022-05-05 PROCEDURE — 99407 PR TOBACCO USE CESSATION INTENSIVE >10 MINUTES: ICD-10-PCS | Mod: S$GLB,,,

## 2022-05-05 PROCEDURE — 99407 BEHAV CHNG SMOKING > 10 MIN: CPT | Mod: S$GLB,,,

## 2022-05-05 NOTE — ANESTHESIA POSTPROCEDURE EVALUATION
Anesthesia Post Evaluation    Patient: Alexis Medrano Jr.    Procedure(s) Performed: Procedure(s) (LRB):  COLONOSCOPY (N/A)    Final Anesthesia Type: general      Patient location during evaluation: PACU  Patient participation: Yes- Able to Participate  Level of consciousness: awake and alert, oriented and awake  Post-procedure vital signs: reviewed and stable  Pain management: adequate  Airway patency: patent    PONV status at discharge: No PONV  Anesthetic complications: no      Cardiovascular status: blood pressure returned to baseline and hemodynamically stable  Respiratory status: unassisted, spontaneous ventilation and room air  Hydration status: euvolemic  Follow-up not needed.          Vitals Value Taken Time       Event Time   Out of Recovery 12:39:31         Pain/Marge Score: No data recorded

## 2022-05-05 NOTE — PROGRESS NOTES
Spoke with patient today in regard to smoking cessation progress for 3/6 month telephone follow up, he states not tobacco free. Patient states having a lot going on and not ready to return to the program at this time. Informed patient of benefit period, future follow up, and contact information if any further help or support is needed. Will complete smart form for 3 and 6 month follow up on Quit attempt #1.

## 2022-06-02 ENCOUNTER — TELEPHONE (OUTPATIENT)
Dept: CARDIOLOGY | Facility: CLINIC | Age: 72
End: 2022-06-02
Payer: MEDICARE

## 2022-06-02 NOTE — TELEPHONE ENCOUNTER
----- Message from Jaqueline Dave sent at 6/2/2022  1:25 PM CDT -----  Regarding: Call back  Contact: [phone]  Type:  Patient Call Back    Who Called:pt  What this is regarding?: reschedule appt today  Would the patient rather a call back or a response via [facility]? Call back  Best Call Back Number:[phone]  Additional Information:     Advised to call back directly if there are further questions, or if these symptoms fail to improve as anticipated or worsen.

## 2022-06-13 ENCOUNTER — TELEPHONE (OUTPATIENT)
Dept: FAMILY MEDICINE | Facility: CLINIC | Age: 72
End: 2022-06-13
Payer: MEDICARE

## 2022-06-13 NOTE — TELEPHONE ENCOUNTER
----- Message from Heather De Dios sent at 6/13/2022  8:11 AM CDT -----  Contact: Patient  Type: Patient Call Back         Who called: Patient         What is the request in detail: calling to resched appt today w/ pcp; states he wanted to see if he could be squeezed in before he attempts th resched w/ Karyn Zacarias later tyhis week; please advise           Best call back number: [phone]         Additional Information: prefer morning appts           Thank You

## 2022-06-13 NOTE — TELEPHONE ENCOUNTER
----- Message from Heather De Dios sent at 6/13/2022  8:11 AM CDT -----  Contact: Patient  Type: Patient Call Back         Who called: Patient         What is the request in detail: calling to resched appt today w/ pcp; states he wanted to see if he could be squeezed in before he attempts th resched w/ Karyn Zacarias later tyhis week; please advise           Best call back number: [phone]         Additional Information: prefer morning appts           Thank You

## 2022-06-16 ENCOUNTER — CLINICAL SUPPORT (OUTPATIENT)
Dept: CARDIOLOGY | Facility: CLINIC | Age: 72
End: 2022-06-16
Attending: INTERNAL MEDICINE
Payer: MEDICARE

## 2022-06-16 DIAGNOSIS — R09.89 BRUIT OF RIGHT CAROTID ARTERY: ICD-10-CM

## 2022-06-16 LAB
LEFT ARM DIASTOLIC BLOOD PRESSURE: 70 MMHG
LEFT ARM SYSTOLIC BLOOD PRESSURE: 128 MMHG
LEFT CBA DIAS: 10 CM/S
LEFT CBA SYS: 51 CM/S
LEFT CCA DIST DIAS: 9 CM/S
LEFT CCA DIST SYS: 46 CM/S
LEFT CCA MID DIAS: 12 CM/S
LEFT CCA MID SYS: 89 CM/S
LEFT CCA PROX DIAS: 8 CM/S
LEFT CCA PROX SYS: 88 CM/S
LEFT ECA DIAS: 10 CM/S
LEFT ECA SYS: 45 CM/S
LEFT ICA DIST DIAS: 23 CM/S
LEFT ICA DIST SYS: 67 CM/S
LEFT ICA MID DIAS: 29 CM/S
LEFT ICA MID SYS: 90 CM/S
LEFT ICA PROX DIAS: 13 CM/S
LEFT ICA PROX SYS: 64 CM/S
LEFT VERTEBRAL DIAS: 6 CM/S
LEFT VERTEBRAL SYS: 54 CM/S
OHS CV CAROTID RIGHT ICA EDV HIGHEST: 15
OHS CV CAROTID ULTRASOUND LEFT ICA/CCA RATIO: 1.96
OHS CV CAROTID ULTRASOUND RIGHT ICA/CCA RATIO: 1.06
OHS CV PV CAROTID LEFT HIGHEST CCA: 89
OHS CV PV CAROTID LEFT HIGHEST ICA: 90
OHS CV PV CAROTID RIGHT HIGHEST CCA: 76
OHS CV PV CAROTID RIGHT HIGHEST ICA: 55
OHS CV US CAROTID LEFT HIGHEST EDV: 29
RIGHT ARM DIASTOLIC BLOOD PRESSURE: 70 MMHG
RIGHT ARM SYSTOLIC BLOOD PRESSURE: 128 MMHG
RIGHT CBA DIAS: 9 CM/S
RIGHT CBA SYS: 55 CM/S
RIGHT CCA DIST DIAS: 8 CM/S
RIGHT CCA DIST SYS: 52 CM/S
RIGHT CCA MID DIAS: 8 CM/S
RIGHT CCA MID SYS: 76 CM/S
RIGHT CCA PROX DIAS: 9 CM/S
RIGHT CCA PROX SYS: 70 CM/S
RIGHT ECA DIAS: 11 CM/S
RIGHT ECA SYS: 83 CM/S
RIGHT ICA DIST DIAS: 15 CM/S
RIGHT ICA DIST SYS: 55 CM/S
RIGHT ICA MID DIAS: 11 CM/S
RIGHT ICA MID SYS: 39 CM/S
RIGHT ICA PROX DIAS: 12 CM/S
RIGHT ICA PROX SYS: 47 CM/S
RIGHT VERTEBRAL DIAS: 19 CM/S
RIGHT VERTEBRAL SYS: 40 CM/S

## 2022-06-16 PROCEDURE — 93880 EXTRACRANIAL BILAT STUDY: CPT | Mod: S$GLB,,, | Performed by: INTERNAL MEDICINE

## 2022-06-16 PROCEDURE — 93880 CV US DOPPLER CAROTID (CUPID ONLY): ICD-10-PCS | Mod: S$GLB,,, | Performed by: INTERNAL MEDICINE

## 2022-06-20 ENCOUNTER — OFFICE VISIT (OUTPATIENT)
Dept: FAMILY MEDICINE | Facility: CLINIC | Age: 72
End: 2022-06-20
Payer: MEDICARE

## 2022-06-20 VITALS
OXYGEN SATURATION: 94 % | HEIGHT: 64 IN | DIASTOLIC BLOOD PRESSURE: 62 MMHG | BODY MASS INDEX: 31.73 KG/M2 | WEIGHT: 185.88 LBS | HEART RATE: 58 BPM | SYSTOLIC BLOOD PRESSURE: 130 MMHG

## 2022-06-20 DIAGNOSIS — N52.9 ERECTILE DYSFUNCTION, UNSPECIFIED ERECTILE DYSFUNCTION TYPE: Primary | ICD-10-CM

## 2022-06-20 DIAGNOSIS — G89.29 CHRONIC BILATERAL LOW BACK PAIN WITHOUT SCIATICA: ICD-10-CM

## 2022-06-20 DIAGNOSIS — M54.50 CHRONIC BILATERAL LOW BACK PAIN WITHOUT SCIATICA: ICD-10-CM

## 2022-06-20 PROCEDURE — 99213 PR OFFICE/OUTPT VISIT, EST, LEVL III, 20-29 MIN: ICD-10-PCS | Mod: S$GLB,,, | Performed by: PHYSICIAN ASSISTANT

## 2022-06-20 PROCEDURE — 1125F AMNT PAIN NOTED PAIN PRSNT: CPT | Mod: CPTII,S$GLB,, | Performed by: PHYSICIAN ASSISTANT

## 2022-06-20 PROCEDURE — 1125F PR PAIN SEVERITY QUANTIFIED, PAIN PRESENT: ICD-10-PCS | Mod: CPTII,S$GLB,, | Performed by: PHYSICIAN ASSISTANT

## 2022-06-20 PROCEDURE — 99999 PR PBB SHADOW E&M-EST. PATIENT-LVL IV: CPT | Mod: PBBFAC,,, | Performed by: PHYSICIAN ASSISTANT

## 2022-06-20 PROCEDURE — 99999 PR PBB SHADOW E&M-EST. PATIENT-LVL IV: ICD-10-PCS | Mod: PBBFAC,,, | Performed by: PHYSICIAN ASSISTANT

## 2022-06-20 PROCEDURE — 3008F PR BODY MASS INDEX (BMI) DOCUMENTED: ICD-10-PCS | Mod: CPTII,S$GLB,, | Performed by: PHYSICIAN ASSISTANT

## 2022-06-20 PROCEDURE — 3075F PR MOST RECENT SYSTOLIC BLOOD PRESS GE 130-139MM HG: ICD-10-PCS | Mod: CPTII,S$GLB,, | Performed by: PHYSICIAN ASSISTANT

## 2022-06-20 PROCEDURE — 3288F PR FALLS RISK ASSESSMENT DOCUMENTED: ICD-10-PCS | Mod: CPTII,S$GLB,, | Performed by: PHYSICIAN ASSISTANT

## 2022-06-20 PROCEDURE — 1159F PR MEDICATION LIST DOCUMENTED IN MEDICAL RECORD: ICD-10-PCS | Mod: CPTII,S$GLB,, | Performed by: PHYSICIAN ASSISTANT

## 2022-06-20 PROCEDURE — 3008F BODY MASS INDEX DOCD: CPT | Mod: CPTII,S$GLB,, | Performed by: PHYSICIAN ASSISTANT

## 2022-06-20 PROCEDURE — 3075F SYST BP GE 130 - 139MM HG: CPT | Mod: CPTII,S$GLB,, | Performed by: PHYSICIAN ASSISTANT

## 2022-06-20 PROCEDURE — 1159F MED LIST DOCD IN RCRD: CPT | Mod: CPTII,S$GLB,, | Performed by: PHYSICIAN ASSISTANT

## 2022-06-20 PROCEDURE — 1101F PT FALLS ASSESS-DOCD LE1/YR: CPT | Mod: CPTII,S$GLB,, | Performed by: PHYSICIAN ASSISTANT

## 2022-06-20 PROCEDURE — 1101F PR PT FALLS ASSESS DOC 0-1 FALLS W/OUT INJ PAST YR: ICD-10-PCS | Mod: CPTII,S$GLB,, | Performed by: PHYSICIAN ASSISTANT

## 2022-06-20 PROCEDURE — 3078F DIAST BP <80 MM HG: CPT | Mod: CPTII,S$GLB,, | Performed by: PHYSICIAN ASSISTANT

## 2022-06-20 PROCEDURE — 3078F PR MOST RECENT DIASTOLIC BLOOD PRESSURE < 80 MM HG: ICD-10-PCS | Mod: CPTII,S$GLB,, | Performed by: PHYSICIAN ASSISTANT

## 2022-06-20 PROCEDURE — 3288F FALL RISK ASSESSMENT DOCD: CPT | Mod: CPTII,S$GLB,, | Performed by: PHYSICIAN ASSISTANT

## 2022-06-20 PROCEDURE — 99213 OFFICE O/P EST LOW 20 MIN: CPT | Mod: S$GLB,,, | Performed by: PHYSICIAN ASSISTANT

## 2022-06-20 RX ORDER — SILDENAFIL 100 MG/1
100 TABLET, FILM COATED ORAL DAILY PRN
Qty: 20 TABLET | Refills: 2 | Status: SHIPPED | OUTPATIENT
Start: 2022-06-20 | End: 2023-09-06 | Stop reason: SDUPTHER

## 2022-06-20 RX ORDER — HYDROCODONE BITARTRATE AND ACETAMINOPHEN 10; 325 MG/1; MG/1
1 TABLET ORAL EVERY 8 HOURS PRN
Qty: 20 TABLET | Refills: 0 | Status: SHIPPED | OUTPATIENT
Start: 2022-06-20 | End: 2022-11-15 | Stop reason: SDUPTHER

## 2022-06-20 RX ORDER — SILDENAFIL 100 MG/1
100 TABLET, FILM COATED ORAL DAILY PRN
Qty: 20 TABLET | Refills: 2 | Status: SHIPPED | OUTPATIENT
Start: 2022-06-20 | End: 2022-06-20 | Stop reason: SDUPTHER

## 2022-06-20 NOTE — TELEPHONE ENCOUNTER
He is seeing me today requesting Norco for his back pain to take PRN.  He takes Diazepam but admits that he does not take it every day.   checked.

## 2022-06-20 NOTE — PROGRESS NOTES
Subjective:       Patient ID: Alexis Medrano Jr. is a 72 y.o. male       Chief Complaint: Back Pain    HPI  Patient's  PCP: Kalani Velázquez MD.  The patient's last visit with me was on 3/24/2022  Patient's past medical history includes:  Chronic neck and back pain, COPD, hypertension, hyperlipidemia and anxiety    Back pain:  No relief with Lyrica, he requests refill of hys Norco, he takes as needed for pain  Constipation: regular BM on Linzess  PT at Care:  Did PT for the past 2 years with some relief. He continues to do the PT at home and he walks for exercise  Erectile dysfunction:  Cialis is not wokring    Review of Systems   Constitutional: Negative for chills and fever.   HENT: Negative for congestion and sore throat.    Eyes: Negative for visual disturbance.   Respiratory: Negative for cough and shortness of breath.    Cardiovascular: Negative for chest pain.   Gastrointestinal: Negative for diarrhea, nausea and vomiting.   Musculoskeletal: Positive for back pain and neck pain. Negative for arthralgias.   Skin: Negative for rash.   Neurological: Negative for headaches.   Psychiatric/Behavioral: Negative for sleep disturbance.        Objective:   Physical Exam  Constitutional:       General: He is not in acute distress.     Appearance: He is well-developed.   HENT:      Head: Normocephalic and atraumatic.      Right Ear: External ear normal.      Left Ear: External ear normal.      Nose: Nose normal.   Eyes:      Conjunctiva/sclera: Conjunctivae normal.      Pupils: Pupils are equal, round, and reactive to light.   Neck:      Vascular: No JVD.   Cardiovascular:      Rate and Rhythm: Normal rate and regular rhythm.      Heart sounds: Normal heart sounds. No murmur heard.    No friction rub. No gallop.   Pulmonary:      Effort: Pulmonary effort is normal. No respiratory distress.      Breath sounds: Normal breath sounds. No wheezing or rales.   Abdominal:      General: Bowel sounds are normal. There is no distension.       Palpations: Abdomen is soft. There is no mass.      Tenderness: There is no abdominal tenderness. There is no guarding.   Musculoskeletal:         General: Normal range of motion.      Cervical back: Normal range of motion and neck supple.   Skin:     General: Skin is warm and dry.   Neurological:      Mental Status: He is alert and oriented to person, place, and time.   Psychiatric:         Behavior: Behavior normal.         Thought Content: Thought content normal.         Judgment: Judgment normal.          Assessment:       1. Erectile dysfunction, unspecified erectile dysfunction type  sildenafiL (VIAGRA) 100 MG tablet        Plan:       Erectile dysfunction, unspecified erectile dysfunction type  -     sildenafiL (VIAGRA) 100 MG tablet; Take 1 tablet (100 mg total) by mouth daily as needed for Erectile Dysfunction.  Dispense: 20 tablet; Refill: 2       He is also asking for pain pills, he last filled pain pills in February.    Follow up in about 3 months (around 9/20/2022) for annual physical with Dr Velázquez.       Karyn Zacarias PA-C   06/20/2022   10:11 AM

## 2022-06-22 ENCOUNTER — TELEPHONE (OUTPATIENT)
Dept: CARDIOLOGY | Facility: CLINIC | Age: 72
End: 2022-06-22
Payer: MEDICARE

## 2022-06-28 DIAGNOSIS — F41.9 ANXIETY: ICD-10-CM

## 2022-06-28 RX ORDER — ESCITALOPRAM OXALATE 20 MG/1
20 TABLET ORAL NIGHTLY
Qty: 90 TABLET | Refills: 3 | Status: SHIPPED | OUTPATIENT
Start: 2022-06-28 | End: 2023-05-31 | Stop reason: SDUPTHER

## 2022-06-28 NOTE — TELEPHONE ENCOUNTER
----- Message from Mary Alice Williamson sent at 2022  2:20 PM CDT -----  Regarding: Refill Request  Please refill the medication(s) listed below : pt requesting for a refill on medication.. states was told by pharmacy prescription was .         Medication # 1 : EScitalopram oxalate (LEXAPRO) 20 MG tablet        Please call the patient when the prescription is sent to pharmacy : [phone]         Can the clinic reply in [facility] : No          [facility] Pharm[facility] :  [id number] [address] [facility]

## 2022-06-28 NOTE — TELEPHONE ENCOUNTER
No new care gaps identified.  [facility] Embedded Care Gaps. Reference number: [id number]. 6/28/2022   2:43:33 PM CDT

## 2022-08-10 DIAGNOSIS — E61.1 IRON DEFICIENCY: ICD-10-CM

## 2022-08-10 RX ORDER — FERROUS SULFATE 325(65) MG
325 TABLET ORAL DAILY
Qty: 90 TABLET | Refills: 0 | Status: SHIPPED | OUTPATIENT
Start: 2022-08-10 | End: 2022-11-21

## 2022-08-10 RX ORDER — FINASTERIDE 5 MG/1
5 TABLET, FILM COATED ORAL DAILY
Qty: 90 TABLET | Refills: 3 | Status: SHIPPED | OUTPATIENT
Start: 2022-08-10 | End: 2022-11-21 | Stop reason: SDUPTHER

## 2022-08-10 NOTE — TELEPHONE ENCOUNTER
No new care gaps identified.  [facility] Embedded Care Gaps. Reference number: [id number]. 8/10/2022   8:42:46 AM KIKET

## 2022-08-10 NOTE — TELEPHONE ENCOUNTER
----- Message from Abril Bowers sent at 8/10/2022  7:32 AM CDT -----  Contact: pt  Type:  RX Refill Request    Who Called:  patient   Refill or New Rx:  refill   RX Name and Strength:  finasteride (PROSCAR) 5 mg tablet  ferrous sulfate (FEOSOL) 325 mg (65 mg iron) Tab tablet    How is the patient currently taking it? (ex. 1XDay):  daily  Is this a 30 day or 90 day RX:  90  Preferred Pharmacy with phone number:  [facility][id number] [address] AT [facility]  Local or Mail Order:  local  Ordering Provider:  Melania Marie Call Back Number:  [phone] (home) [phone] (work)    Additional Information:  Patient called he is out of his meds he is asking if you will call in for his refills

## 2022-09-29 ENCOUNTER — OFFICE VISIT (OUTPATIENT)
Dept: FAMILY MEDICINE | Facility: CLINIC | Age: 72
End: 2022-09-29
Payer: MEDICARE

## 2022-09-29 ENCOUNTER — LAB VISIT (OUTPATIENT)
Dept: LAB | Facility: HOSPITAL | Age: 72
End: 2022-09-29
Attending: FAMILY MEDICINE
Payer: MEDICARE

## 2022-09-29 VITALS
BODY MASS INDEX: 30.77 KG/M2 | SYSTOLIC BLOOD PRESSURE: 124 MMHG | HEART RATE: 69 BPM | WEIGHT: 179.25 LBS | DIASTOLIC BLOOD PRESSURE: 74 MMHG | OXYGEN SATURATION: 97 %

## 2022-09-29 DIAGNOSIS — D72.828 OTHER ELEVATED WHITE BLOOD CELL (WBC) COUNT: ICD-10-CM

## 2022-09-29 DIAGNOSIS — B96.89 ACUTE BACTERIAL BRONCHITIS: Primary | ICD-10-CM

## 2022-09-29 DIAGNOSIS — M54.42 CHRONIC BILATERAL LOW BACK PAIN WITH BILATERAL SCIATICA: ICD-10-CM

## 2022-09-29 DIAGNOSIS — I10 ESSENTIAL HYPERTENSION: ICD-10-CM

## 2022-09-29 DIAGNOSIS — E78.49 OTHER HYPERLIPIDEMIA: ICD-10-CM

## 2022-09-29 DIAGNOSIS — J20.8 ACUTE BACTERIAL BRONCHITIS: Primary | ICD-10-CM

## 2022-09-29 DIAGNOSIS — F41.9 ANXIETY: ICD-10-CM

## 2022-09-29 DIAGNOSIS — F17.200 TOBACCO DEPENDENCE: ICD-10-CM

## 2022-09-29 DIAGNOSIS — M54.41 CHRONIC BILATERAL LOW BACK PAIN WITH BILATERAL SCIATICA: ICD-10-CM

## 2022-09-29 DIAGNOSIS — E55.9 VITAMIN D DEFICIENCY: ICD-10-CM

## 2022-09-29 DIAGNOSIS — Z98.890 S/P LUMBAR SPINE OPERATION: ICD-10-CM

## 2022-09-29 DIAGNOSIS — G89.29 CHRONIC BILATERAL LOW BACK PAIN WITH BILATERAL SCIATICA: ICD-10-CM

## 2022-09-29 LAB
25(OH)D3+25(OH)D2 SERPL-MCNC: 52 NG/ML (ref 30–96)
ALBUMIN SERPL BCP-MCNC: 3.9 G/DL (ref 3.5–5.2)
ALP SERPL-CCNC: 60 U/L (ref 55–135)
ALT SERPL W/O P-5'-P-CCNC: 21 U/L (ref 10–44)
ANION GAP SERPL CALC-SCNC: 13 MMOL/L (ref 8–16)
AST SERPL-CCNC: 20 U/L (ref 10–40)
BASOPHILS # BLD AUTO: 0.04 K/UL (ref 0–0.2)
BASOPHILS NFR BLD: 0.5 % (ref 0–1.9)
BILIRUB SERPL-MCNC: 0.5 MG/DL (ref 0.1–1)
BUN SERPL-MCNC: 22 MG/DL (ref 8–23)
CALCIUM SERPL-MCNC: 9.9 MG/DL (ref 8.7–10.5)
CHLORIDE SERPL-SCNC: 104 MMOL/L (ref 95–110)
CHOLEST SERPL-MCNC: 120 MG/DL (ref 120–199)
CHOLEST/HDLC SERPL: 3.2 {RATIO} (ref 2–5)
CO2 SERPL-SCNC: 23 MMOL/L (ref 23–29)
CREAT SERPL-MCNC: 1.2 MG/DL (ref 0.5–1.4)
DIFFERENTIAL METHOD: ABNORMAL
EOSINOPHIL # BLD AUTO: 0.1 K/UL (ref 0–0.5)
EOSINOPHIL NFR BLD: 1.7 % (ref 0–8)
ERYTHROCYTE [DISTWIDTH] IN BLOOD BY AUTOMATED COUNT: 15 % (ref 11.5–14.5)
EST. GFR  (NO RACE VARIABLE): >60 ML/MIN/1.73 M^2
GLUCOSE SERPL-MCNC: 106 MG/DL (ref 70–110)
HCT VFR BLD AUTO: 47.3 % (ref 40–54)
HDLC SERPL-MCNC: 38 MG/DL (ref 40–75)
HDLC SERPL: 31.7 % (ref 20–50)
HGB BLD-MCNC: 14.1 G/DL (ref 14–18)
IMM GRANULOCYTES # BLD AUTO: 0.02 K/UL (ref 0–0.04)
IMM GRANULOCYTES NFR BLD AUTO: 0.2 % (ref 0–0.5)
LDLC SERPL CALC-MCNC: 67.8 MG/DL (ref 63–159)
LYMPHOCYTES # BLD AUTO: 1.1 K/UL (ref 1–4.8)
LYMPHOCYTES NFR BLD: 13.5 % (ref 18–48)
MCH RBC QN AUTO: 28.1 PG (ref 27–31)
MCHC RBC AUTO-ENTMCNC: 29.8 G/DL (ref 32–36)
MCV RBC AUTO: 94 FL (ref 82–98)
MONOCYTES # BLD AUTO: 0.6 K/UL (ref 0.3–1)
MONOCYTES NFR BLD: 7.9 % (ref 4–15)
NEUTROPHILS # BLD AUTO: 6.2 K/UL (ref 1.8–7.7)
NEUTROPHILS NFR BLD: 76.2 % (ref 38–73)
NONHDLC SERPL-MCNC: 82 MG/DL
NRBC BLD-RTO: 0 /100 WBC
PLATELET # BLD AUTO: 205 K/UL (ref 150–450)
PMV BLD AUTO: 12 FL (ref 9.2–12.9)
POTASSIUM SERPL-SCNC: 3.9 MMOL/L (ref 3.5–5.1)
PROT SERPL-MCNC: 7.5 G/DL (ref 6–8.4)
RBC # BLD AUTO: 5.02 M/UL (ref 4.6–6.2)
SODIUM SERPL-SCNC: 140 MMOL/L (ref 136–145)
TRIGL SERPL-MCNC: 71 MG/DL (ref 30–150)
WBC # BLD AUTO: 8.13 K/UL (ref 3.9–12.7)

## 2022-09-29 PROCEDURE — 1125F PR PAIN SEVERITY QUANTIFIED, PAIN PRESENT: ICD-10-PCS | Mod: CPTII,S$GLB,, | Performed by: FAMILY MEDICINE

## 2022-09-29 PROCEDURE — 3288F FALL RISK ASSESSMENT DOCD: CPT | Mod: CPTII,S$GLB,, | Performed by: FAMILY MEDICINE

## 2022-09-29 PROCEDURE — 1159F MED LIST DOCD IN RCRD: CPT | Mod: CPTII,S$GLB,, | Performed by: FAMILY MEDICINE

## 2022-09-29 PROCEDURE — 3074F SYST BP LT 130 MM HG: CPT | Mod: CPTII,S$GLB,, | Performed by: FAMILY MEDICINE

## 2022-09-29 PROCEDURE — 3008F BODY MASS INDEX DOCD: CPT | Mod: CPTII,S$GLB,, | Performed by: FAMILY MEDICINE

## 2022-09-29 PROCEDURE — 99999 PR PBB SHADOW E&M-EST. PATIENT-LVL V: CPT | Mod: PBBFAC,,, | Performed by: FAMILY MEDICINE

## 2022-09-29 PROCEDURE — 80061 LIPID PANEL: CPT | Performed by: FAMILY MEDICINE

## 2022-09-29 PROCEDURE — 99999 PR PBB SHADOW E&M-EST. PATIENT-LVL V: ICD-10-PCS | Mod: PBBFAC,,, | Performed by: FAMILY MEDICINE

## 2022-09-29 PROCEDURE — 3008F PR BODY MASS INDEX (BMI) DOCUMENTED: ICD-10-PCS | Mod: CPTII,S$GLB,, | Performed by: FAMILY MEDICINE

## 2022-09-29 PROCEDURE — 3074F PR MOST RECENT SYSTOLIC BLOOD PRESSURE < 130 MM HG: ICD-10-PCS | Mod: CPTII,S$GLB,, | Performed by: FAMILY MEDICINE

## 2022-09-29 PROCEDURE — 36415 COLL VENOUS BLD VENIPUNCTURE: CPT | Mod: PO | Performed by: FAMILY MEDICINE

## 2022-09-29 PROCEDURE — 3078F DIAST BP <80 MM HG: CPT | Mod: CPTII,S$GLB,, | Performed by: FAMILY MEDICINE

## 2022-09-29 PROCEDURE — 82306 VITAMIN D 25 HYDROXY: CPT | Performed by: FAMILY MEDICINE

## 2022-09-29 PROCEDURE — 80053 COMPREHEN METABOLIC PANEL: CPT | Performed by: FAMILY MEDICINE

## 2022-09-29 PROCEDURE — 1159F PR MEDICATION LIST DOCUMENTED IN MEDICAL RECORD: ICD-10-PCS | Mod: CPTII,S$GLB,, | Performed by: FAMILY MEDICINE

## 2022-09-29 PROCEDURE — 3078F PR MOST RECENT DIASTOLIC BLOOD PRESSURE < 80 MM HG: ICD-10-PCS | Mod: CPTII,S$GLB,, | Performed by: FAMILY MEDICINE

## 2022-09-29 PROCEDURE — 1125F AMNT PAIN NOTED PAIN PRSNT: CPT | Mod: CPTII,S$GLB,, | Performed by: FAMILY MEDICINE

## 2022-09-29 PROCEDURE — 99214 OFFICE O/P EST MOD 30 MIN: CPT | Mod: S$GLB,,, | Performed by: FAMILY MEDICINE

## 2022-09-29 PROCEDURE — 1100F PR PT FALLS ASSESS DOC 2+ FALLS/FALL W/INJURY/YR: ICD-10-PCS | Mod: CPTII,S$GLB,, | Performed by: FAMILY MEDICINE

## 2022-09-29 PROCEDURE — 99214 PR OFFICE/OUTPT VISIT, EST, LEVL IV, 30-39 MIN: ICD-10-PCS | Mod: S$GLB,,, | Performed by: FAMILY MEDICINE

## 2022-09-29 PROCEDURE — 3288F PR FALLS RISK ASSESSMENT DOCUMENTED: ICD-10-PCS | Mod: CPTII,S$GLB,, | Performed by: FAMILY MEDICINE

## 2022-09-29 PROCEDURE — 85025 COMPLETE CBC W/AUTO DIFF WBC: CPT | Performed by: FAMILY MEDICINE

## 2022-09-29 PROCEDURE — 1100F PTFALLS ASSESS-DOCD GE2>/YR: CPT | Mod: CPTII,S$GLB,, | Performed by: FAMILY MEDICINE

## 2022-09-29 RX ORDER — DOXYCYCLINE 100 MG/1
100 CAPSULE ORAL 2 TIMES DAILY
Qty: 20 CAPSULE | Refills: 0 | Status: SHIPPED | OUTPATIENT
Start: 2022-09-29 | End: 2022-10-09

## 2022-09-29 RX ORDER — DIAZEPAM 5 MG/1
TABLET ORAL
Qty: 30 TABLET | Refills: 5 | Status: SHIPPED | OUTPATIENT
Start: 2022-09-29 | End: 2023-03-29 | Stop reason: SDUPTHER

## 2022-09-29 RX ORDER — PREDNISONE 20 MG/1
20 TABLET ORAL DAILY
Qty: 7 TABLET | Refills: 0 | Status: SHIPPED | OUTPATIENT
Start: 2022-09-29 | End: 2023-10-10 | Stop reason: SDUPTHER

## 2022-10-02 NOTE — PROGRESS NOTES
Assessment:       1. Acute bacterial bronchitis    2. Tobacco dependence    3. Other hyperlipidemia    4. Essential hypertension    5. Vitamin D deficiency    6. Other elevated white blood cell (WBC) count    7. Anxiety    8. S/P lumbar spine operation    9. Chronic bilateral low back pain with bilateral sciatica          Plan:       Acute bacterial bronchitis:  New problem, next visit workup  -     doxycycline (MONODOX) 100 MG capsule; Take 1 capsule (100 mg total) by mouth 2 (two) times daily. for 10 days  Dispense: 20 capsule; Refill: 0  -     predniSONE (DELTASONE) 20 MG tablet; Take 1 tablet (20 mg total) by mouth once daily.  Dispense: 7 tablet; Refill: 0    Tobacco dependence: Worsening    Other hyperlipidemia:  Uncontrolled, HDL levels are low.  -     Comprehensive Metabolic Panel; Future; Expected date: 09/29/2022  -     Lipid Panel; Future; Expected date: 09/29/2022    Essential hypertension:  Stable  -     Comprehensive Metabolic Panel; Future; Expected date: 09/29/2022  -     Lipid Panel; Future; Expected date: 09/29/2022  -     Microalbumin/Creatinine Ratio, Urine; Future; Expected date: 09/29/2022    Vitamin D deficiency:  Stable  -     Vitamin D; Future; Expected date: 09/29/2022    Other elevated white blood cell (WBC) count: Worsening  -     CBC Auto Differential; Future; Expected date: 09/29/2022    Anxiety: Stable  -     diazePAM (VALIUM) 5 MG tablet; TAKE 1 TABLET(5 MG) BY MOUTH EVERY NIGHT AS NEEDED FOR ANXIETY Strength: 5 mg  Dispense: 30 tablet; Refill: 5    S/P lumbar spine operation: Stable  -     diazePAM (VALIUM) 5 MG tablet; TAKE 1 TABLET(5 MG) BY MOUTH EVERY NIGHT AS NEEDED FOR ANXIETY Strength: 5 mg  Dispense: 30 tablet; Refill: 5  -     Ambulatory referral/consult to Pain Clinic; Future; Expected date: 10/06/2022    Chronic bilateral low back pain with bilateral sciatica: Worsening  -     Ambulatory referral/consult to Pain Clinic; Future; Expected date: 10/06/2022       Will refer the  patient to the pain clinic, [place] prescription monitoring program was checked and okay, diazepam was prescribed for the patient, will start patient on doxycycline and prednisone.  The symptoms get worse, the patient notify us immediately.  The patient's BMI has been recorded in the chart. The patient has been provided educational materials regarding the benefits of attaining and maintaining a normal weight. We will continue to address and follow this issue during follow up visits.   Patient agreed with assessment and plan. Patient verbalized understanding.     Subjective:       Patient ID: Alexis Medrano Jr. is a 72 y.o. male.    Chief Complaint: Follow-up anxiety and medication refill    HPI    Anxiety:  The patient is here today complaining of symptoms of anxiety and he takes Valium and also for muscle spasms, the patient currently not taking Lyrica for lower back pain because stated that he is not working anymore, he would like a referral to see the pain management specialist.  The patient stated physical therapy help did tremendously after his surgery.  The patient stated that the pain is in the lower back radiated to bilateral lower extremities.    Hypertension:  The patient is currently taking atenolol and telmisartan HCTZ, the patient did not bring a log of the blood pressure readings from home, denies symptoms of chest pain at this office visit, the patient did not bring a log of the blood pressure readings from home, the last cholesterol levels showed presence of decreased HDL levels.    Cough:  Complains of congestion in the chest, wheezing, the patient still smoking cigarettes, he does not want to quit at this time, the patient complained of yellow sputum, the sputum is more than usual.    Past medical history, past social history was reviewed and discussed with the patient.    Review of Systems   Constitutional:  Negative for activity change and appetite change.   HENT:  Negative for congestion and ear  discharge.    Eyes:  Negative for discharge and itching.   Respiratory:  Positive for shortness of breath and wheezing. Negative for choking and chest tightness.    Cardiovascular:  Negative for palpitations and leg swelling.   Gastrointestinal:  Negative for abdominal distention and abdominal pain.   Endocrine: Negative for cold intolerance and heat intolerance.   Genitourinary:  Negative for dysuria and flank pain.   Musculoskeletal:  Positive for back pain.   Skin:  Negative for pallor.   Allergic/Immunologic: Negative for environmental allergies and food allergies.   Neurological:  Negative for dizziness and facial asymmetry.   Hematological:  Negative for adenopathy. Does not bruise/bleed easily.   Psychiatric/Behavioral:  Positive for sleep disturbance. Negative for agitation, confusion and decreased concentration. The patient is nervous/anxious.      Objective:      Physical Exam  Vitals and nursing note reviewed.   Constitutional:       General: He is not in acute distress.     Appearance: Normal appearance. He is well-developed. He is obese. He is not diaphoretic.   HENT:      Head: Normocephalic and atraumatic.      Right Ear: External ear normal.      Left Ear: External ear normal.      Nose: Nose normal.   Eyes:      General: No scleral icterus.        Left eye: No discharge.   Cardiovascular:      Rate and Rhythm: Normal rate and regular rhythm.      Heart sounds: Normal heart sounds.   Pulmonary:      Effort: Pulmonary effort is normal. No respiratory distress.      Breath sounds: Wheezing present.   Musculoskeletal:         General: Tenderness (Lower back) present.      Cervical back: Normal range of motion and neck supple.   Skin:     General: Skin is warm and dry.      Coloration: Skin is not pale.   Neurological:      Mental Status: He is alert.      Cranial Nerves: No cranial nerve deficit.      Motor: No abnormal muscle tone.      Coordination: Coordination normal.   Psychiatric:          Behavior: Behavior normal.         Thought Content: Thought content normal.         Judgment: Judgment normal.

## 2022-10-08 ENCOUNTER — TELEPHONE (OUTPATIENT)
Dept: FAMILY MEDICINE | Facility: CLINIC | Age: 72
End: 2022-10-08
Payer: MEDICARE

## 2022-10-08 NOTE — TELEPHONE ENCOUNTER
----- Message from Kin Dinero sent at 10/7/2022  2:43 PM CDT -----  Contact: pt at [phone]  Type: Needs Medical Advice  Who Called:  pt    Best Call Back Number: [phone]    Additional Information: pt is calling the office requesting a call back with his results from blood work. Please advise.

## 2022-10-08 NOTE — TELEPHONE ENCOUNTER
Gave patient provider results finding and request.  Informed patient of his appointment times and dates

## 2022-10-08 NOTE — TELEPHONE ENCOUNTER
----- Message from Kin Dinero sent at 10/7/2022  2:43 PM CDT -----  Contact: pt at [phone]  Type: Needs Medical Advice  Who Called:  pt    Best Call Back Number: [phone]    Additional Information: pt is calling the office requesting a call back with his results from blood work. Please advise.

## 2022-10-13 ENCOUNTER — CLINICAL SUPPORT (OUTPATIENT)
Dept: SMOKING CESSATION | Facility: CLINIC | Age: 72
End: 2022-10-13
Payer: COMMERCIAL

## 2022-10-13 DIAGNOSIS — R60.0 LOCALIZED EDEMA: ICD-10-CM

## 2022-10-13 DIAGNOSIS — E78.00 HYPERCHOLESTEROLEMIA: ICD-10-CM

## 2022-10-13 DIAGNOSIS — J44.9 CHRONIC OBSTRUCTIVE PULMONARY DISEASE, UNSPECIFIED COPD TYPE: ICD-10-CM

## 2022-10-13 DIAGNOSIS — F17.200 NICOTINE DEPENDENCE: Primary | ICD-10-CM

## 2022-10-13 PROCEDURE — 99407 PR TOBACCO USE CESSATION INTENSIVE >10 MINUTES: ICD-10-PCS | Mod: S$GLB,,,

## 2022-10-13 PROCEDURE — 99407 BEHAV CHNG SMOKING > 10 MIN: CPT | Mod: S$GLB,,,

## 2022-10-13 RX ORDER — MONTELUKAST SODIUM 10 MG/1
TABLET ORAL
Qty: 90 TABLET | Refills: 3 | Status: SHIPPED | OUTPATIENT
Start: 2022-10-13 | End: 2022-11-21 | Stop reason: SDUPTHER

## 2022-10-13 RX ORDER — ROSUVASTATIN CALCIUM 20 MG/1
TABLET, COATED ORAL
Qty: 90 TABLET | Refills: 3 | OUTPATIENT
Start: 2022-10-13

## 2022-10-13 RX ORDER — FUROSEMIDE 20 MG/1
TABLET ORAL
Qty: 90 TABLET | Refills: 3 | Status: SHIPPED | OUTPATIENT
Start: 2022-10-13 | End: 2022-11-21 | Stop reason: SDUPTHER

## 2022-10-13 NOTE — TELEPHONE ENCOUNTER
No new care gaps identified.  [facility] Embedded Care Gaps. Reference number: [id number]. 10/13/2022   6:13:39 AM KIKET

## 2022-10-13 NOTE — TELEPHONE ENCOUNTER
Refill Decision Note   Alexis Medrano  is requesting a refill authorization.  Brief Assessment and Rationale for Refill:  Quick Discontinue  Approve    -Medication-Related Problems Identified: Dose adjustment  Medication Therapy Plan:  Rosuvastatin dosage increase 10/13/21    Medication Reconciliation Completed: No   Comments:     No Care Gaps recommended.     Note composed:12:33 PM 10/13/2022

## 2022-10-27 ENCOUNTER — TELEPHONE (OUTPATIENT)
Dept: FAMILY MEDICINE | Facility: CLINIC | Age: 72
End: 2022-10-27
Payer: MEDICARE

## 2022-11-09 ENCOUNTER — TELEPHONE (OUTPATIENT)
Dept: SMOKING CESSATION | Facility: CLINIC | Age: 72
End: 2022-11-09
Payer: MEDICARE

## 2022-11-09 NOTE — TELEPHONE ENCOUNTER
I called patient as he was no show for tobacco cessation appointment but no answer and could not leave a message.

## 2022-11-11 ENCOUNTER — TELEPHONE (OUTPATIENT)
Dept: PAIN MEDICINE | Facility: CLINIC | Age: 72
End: 2022-11-11
Payer: MEDICARE

## 2022-11-14 ENCOUNTER — TELEPHONE (OUTPATIENT)
Dept: PAIN MEDICINE | Facility: CLINIC | Age: 72
End: 2022-11-14
Payer: MEDICARE

## 2022-11-15 DIAGNOSIS — M54.50 CHRONIC BILATERAL LOW BACK PAIN WITHOUT SCIATICA: ICD-10-CM

## 2022-11-15 DIAGNOSIS — G89.29 CHRONIC BILATERAL LOW BACK PAIN WITHOUT SCIATICA: ICD-10-CM

## 2022-11-15 NOTE — TELEPHONE ENCOUNTER
----- Message from Keenan Obrien sent at 11/15/2022 11:26 AM CST -----  Type: Needs Medical Advice  Who Called:  Patient  Symptoms (please be specific):  Back pain  How long has patient had these symptoms:    Pharmacy name and phone #:        [facility]. [address] [address]  Phone: [phone] Fax: [phone]      Best Call Back Number: [phone]  Additional Information: Pt is scheduled with Pain Management but needs something called in to tide him over    HYDROcodone-acetaminophen (NORCO)  mg per tablet    Please call to advise

## 2022-11-15 NOTE — TELEPHONE ENCOUNTER
No new care gaps identified.  [facility] Embedded Care Gaps. Reference number: [id number]. 11/15/2022   3:12:21 PM CST

## 2022-11-16 PROBLEM — T07.XXXA MULTIPLE STAB WOUNDS: Status: ACTIVE | Noted: 2022-11-16

## 2022-11-16 RX ORDER — HYDROCODONE BITARTRATE AND ACETAMINOPHEN 10; 325 MG/1; MG/1
1 TABLET ORAL EVERY 8 HOURS PRN
Qty: 20 TABLET | Refills: 0 | Status: SHIPPED | OUTPATIENT
Start: 2022-11-16 | End: 2023-04-10 | Stop reason: ALTCHOICE

## 2022-11-21 ENCOUNTER — PES CALL (OUTPATIENT)
Dept: ADMINISTRATIVE | Facility: CLINIC | Age: 72
End: 2022-11-21
Payer: MEDICARE

## 2022-11-21 DIAGNOSIS — F41.9 ANXIETY: ICD-10-CM

## 2022-11-21 DIAGNOSIS — Z98.890 S/P LUMBAR SPINE OPERATION: ICD-10-CM

## 2022-11-21 DIAGNOSIS — M54.50 CHRONIC BILATERAL LOW BACK PAIN WITHOUT SCIATICA: ICD-10-CM

## 2022-11-21 DIAGNOSIS — J44.9 CHRONIC OBSTRUCTIVE PULMONARY DISEASE, UNSPECIFIED COPD TYPE: ICD-10-CM

## 2022-11-21 DIAGNOSIS — G89.29 CHRONIC BILATERAL LOW BACK PAIN WITHOUT SCIATICA: ICD-10-CM

## 2022-11-21 DIAGNOSIS — I10 ESSENTIAL HYPERTENSION: ICD-10-CM

## 2022-11-21 DIAGNOSIS — R60.0 LOCALIZED EDEMA: ICD-10-CM

## 2022-11-21 DIAGNOSIS — K59.00 CONSTIPATION, UNSPECIFIED CONSTIPATION TYPE: ICD-10-CM

## 2022-11-22 RX ORDER — ATENOLOL 100 MG/1
100 TABLET ORAL DAILY
Qty: 90 TABLET | Refills: 3 | Status: SHIPPED | OUTPATIENT
Start: 2022-11-22 | End: 2023-05-06

## 2022-11-22 RX ORDER — MONTELUKAST SODIUM 10 MG/1
10 TABLET ORAL NIGHTLY
Qty: 90 TABLET | Refills: 3 | Status: SHIPPED | OUTPATIENT
Start: 2022-11-22 | End: 2023-08-08 | Stop reason: SDUPTHER

## 2022-11-22 RX ORDER — DIAZEPAM 5 MG/1
TABLET ORAL
Qty: 30 TABLET | Refills: 5 | OUTPATIENT
Start: 2022-11-22

## 2022-11-22 RX ORDER — ALBUTEROL SULFATE 90 UG/1
AEROSOL, METERED RESPIRATORY (INHALATION)
Qty: 54 G | Refills: 3 | Status: SHIPPED | OUTPATIENT
Start: 2022-11-22 | End: 2023-05-31 | Stop reason: SDUPTHER

## 2022-11-22 RX ORDER — ROSUVASTATIN CALCIUM 40 MG/1
40 TABLET, COATED ORAL NIGHTLY
Qty: 90 TABLET | Refills: 1 | Status: SHIPPED | OUTPATIENT
Start: 2022-11-22 | End: 2023-05-08 | Stop reason: SDUPTHER

## 2022-11-22 RX ORDER — FINASTERIDE 5 MG/1
5 TABLET, FILM COATED ORAL DAILY
Qty: 90 TABLET | Refills: 3 | Status: SHIPPED | OUTPATIENT
Start: 2022-11-22 | End: 2023-05-31 | Stop reason: SDUPTHER

## 2022-11-22 RX ORDER — FUROSEMIDE 20 MG/1
20 TABLET ORAL DAILY
Qty: 90 TABLET | Refills: 3 | Status: SHIPPED | OUTPATIENT
Start: 2022-11-22 | End: 2023-09-14 | Stop reason: SDUPTHER

## 2022-11-22 RX ORDER — MELOXICAM 15 MG/1
TABLET ORAL
Qty: 30 TABLET | Refills: 1 | Status: ON HOLD | OUTPATIENT
Start: 2022-11-22 | End: 2024-03-20 | Stop reason: HOSPADM

## 2022-11-22 NOTE — TELEPHONE ENCOUNTER
No new care gaps identified.  [facility] Embedded Care Gaps. Reference number: [id number]. 11/22/2022   2:16:58 PM CST

## 2022-11-22 NOTE — TELEPHONE ENCOUNTER
Refill Routing Note   Medication(s) are not appropriate for processing by [facility] for the following reason(s):      - Outside of protocol  - Medication not previously prescribed by PCP  - Drug-Drug Interaction (furosemide, telmisartan-hydrochlorothiazide)    [facility] action(s):  Approve  Route  Defer Medication-related problems identified: Drug-drug interaction        Medication reconciliation completed: No     Appointments  past 12m or future 3m with PCP    Date Provider   Last Visit   9/29/2022 Kalani Velázquez MD   Next Visit   11/21/2022 Kalani Velázquez MD   ED visits in past 90 days: 0        Note composed:2:25 PM 11/22/2022

## 2022-11-27 ENCOUNTER — NURSE TRIAGE (OUTPATIENT)
Dept: ADMINISTRATIVE | Facility: CLINIC | Age: 72
End: 2022-11-27
Payer: MEDICARE

## 2022-11-27 DIAGNOSIS — F41.9 ANXIETY: ICD-10-CM

## 2022-11-27 DIAGNOSIS — Z98.890 S/P LUMBAR SPINE OPERATION: ICD-10-CM

## 2022-11-27 NOTE — TELEPHONE ENCOUNTER
Spoke with pt who states he was told by [facility] that he diazapam medication was stopped by provider. Nurse attempted to call [facility] pharmacy, but unable to reach staff.   Reason for Disposition   Caller requesting a CONTROLLED substance prescription refill (e.g., narcotics, ADHD medicines)    Protocols used: Medication Refill and Renewal Call-A-AH

## 2022-11-27 NOTE — TELEPHONE ENCOUNTER
No new care gaps identified.  [facility] Embedded Care Gaps. Reference number: [id number]. 11/27/2022   1:14:13 PM CST

## 2022-11-28 ENCOUNTER — OFFICE VISIT (OUTPATIENT)
Dept: PAIN MEDICINE | Facility: CLINIC | Age: 72
End: 2022-11-28
Payer: MEDICARE

## 2022-11-28 VITALS
DIASTOLIC BLOOD PRESSURE: 84 MMHG | OXYGEN SATURATION: 96 % | SYSTOLIC BLOOD PRESSURE: 156 MMHG | HEART RATE: 67 BPM | WEIGHT: 180.31 LBS | BODY MASS INDEX: 30.78 KG/M2 | HEIGHT: 64 IN

## 2022-11-28 DIAGNOSIS — M47.816 LUMBAR SPONDYLOSIS: ICD-10-CM

## 2022-11-28 DIAGNOSIS — M54.16 LUMBAR RADICULOPATHY: Primary | ICD-10-CM

## 2022-11-28 DIAGNOSIS — G89.4 CHRONIC PAIN SYNDROME: ICD-10-CM

## 2022-11-28 DIAGNOSIS — M48.061 SPINAL STENOSIS OF LUMBAR REGION, UNSPECIFIED WHETHER NEUROGENIC CLAUDICATION PRESENT: ICD-10-CM

## 2022-11-28 PROCEDURE — 99999 PR PBB SHADOW E&M-EST. PATIENT-LVL IV: CPT | Mod: PBBFAC,,,

## 2022-11-28 PROCEDURE — 3079F DIAST BP 80-89 MM HG: CPT | Mod: CPTII,S$GLB,,

## 2022-11-28 PROCEDURE — 1125F AMNT PAIN NOTED PAIN PRSNT: CPT | Mod: CPTII,S$GLB,,

## 2022-11-28 PROCEDURE — 1159F MED LIST DOCD IN RCRD: CPT | Mod: CPTII,S$GLB,,

## 2022-11-28 PROCEDURE — 3077F PR MOST RECENT SYSTOLIC BLOOD PRESSURE >= 140 MM HG: ICD-10-PCS | Mod: CPTII,S$GLB,,

## 2022-11-28 PROCEDURE — 99214 PR OFFICE/OUTPT VISIT, EST, LEVL IV, 30-39 MIN: ICD-10-PCS | Mod: S$GLB,,,

## 2022-11-28 PROCEDURE — 1101F PT FALLS ASSESS-DOCD LE1/YR: CPT | Mod: CPTII,S$GLB,,

## 2022-11-28 PROCEDURE — 3008F BODY MASS INDEX DOCD: CPT | Mod: CPTII,S$GLB,,

## 2022-11-28 PROCEDURE — 3288F PR FALLS RISK ASSESSMENT DOCUMENTED: ICD-10-PCS | Mod: CPTII,S$GLB,,

## 2022-11-28 PROCEDURE — 3288F FALL RISK ASSESSMENT DOCD: CPT | Mod: CPTII,S$GLB,,

## 2022-11-28 PROCEDURE — 3077F SYST BP >= 140 MM HG: CPT | Mod: CPTII,S$GLB,,

## 2022-11-28 PROCEDURE — 99999 PR PBB SHADOW E&M-EST. PATIENT-LVL IV: ICD-10-PCS | Mod: PBBFAC,,,

## 2022-11-28 PROCEDURE — 1101F PR PT FALLS ASSESS DOC 0-1 FALLS W/OUT INJ PAST YR: ICD-10-PCS | Mod: CPTII,S$GLB,,

## 2022-11-28 PROCEDURE — 80326 AMPHETAMINES 5 OR MORE: CPT

## 2022-11-28 PROCEDURE — 99214 OFFICE O/P EST MOD 30 MIN: CPT | Mod: S$GLB,,,

## 2022-11-28 PROCEDURE — 1159F PR MEDICATION LIST DOCUMENTED IN MEDICAL RECORD: ICD-10-PCS | Mod: CPTII,S$GLB,,

## 2022-11-28 PROCEDURE — 3008F PR BODY MASS INDEX (BMI) DOCUMENTED: ICD-10-PCS | Mod: CPTII,S$GLB,,

## 2022-11-28 PROCEDURE — 1125F PR PAIN SEVERITY QUANTIFIED, PAIN PRESENT: ICD-10-PCS | Mod: CPTII,S$GLB,,

## 2022-11-28 PROCEDURE — 3079F PR MOST RECENT DIASTOLIC BLOOD PRESSURE 80-89 MM HG: ICD-10-PCS | Mod: CPTII,S$GLB,,

## 2022-11-28 RX ORDER — DIAZEPAM 5 MG/1
TABLET ORAL
Qty: 30 TABLET | Refills: 0 | OUTPATIENT
Start: 2022-11-28

## 2022-11-28 NOTE — PROGRESS NOTES
[facility] Follow Up Evaluation    Referred by: Dr. Velázquez  Reason for referral: shoulder pain    CC:   Chief Complaint   Patient presents with    Back Pain    Hip Pain    Leg Pain      Last 3 PDI Scores 11/6/2020 12/2/2019   Pain Disability Index (PDI) 30 40     Interval HPI 11/28/2022: Alexis Medrano Jr. returns to the clinic for follow up.  He returns for follow-up with complaints of worsening lower back pain, 7/10, constant, worse with activities.  His pain is somewhat alleviated with rest.  He states it starts in his lower back and radiates down bilateral legs stopping at his knees.  He reports occasional feelings of weakness and numbness and tingling in his right foot.  He denies any bowel or bladder incontinence.  He reports going to formal physical therapy over the past 2 years since his previous spinal surgery with some improvement of his pain but not complete.     HPI:   Alexis Medrano Jr. is a 72 y.o. male who complains of shoulder pain    Onset: 30 years   Inciting Event: none  Progression: since onset, pain is gradually worsening  Current Pain Score: 8/10  Timing: intermittent  Quality: aching  Radiation: yes, down both legs  Associated numbness or weakness:  Yes numbness, yes weakness  Allievated by: nothing  Is Pain Level Acceptable?: No    Previous Therapies:  PT/OT: yes, in the past  HEP:   Interventions:   Surgery:  Medications:   - NSAIDS: aleve  - MSK Relaxants:   - TCAs:   - SNRIs:   - Topicals:   - Anticonvulsants:  - Opioids: hydrocodone    History:    Current Outpatient Medications:     acetaminophen (TYLENOL) 325 MG tablet, Take 2 tablets (650 mg total) by mouth every 4 (four) hours as needed., Disp: , Rfl: 0    albuterol (PROVENTIL/VENTOLIN HFA) 90 mcg/actuation inhaler, INHALE 2 PUFFS BY MOUTH EVERY 6 HOURS AS NEEDED FOR WHEEZING OR RESCUE, Disp: 54 g, Rfl: 3    aspirin (ECOTRIN) 81 MG EC tablet, Take 81 mg by mouth once daily., Disp: , Rfl:     atenoloL (TENORMIN) 100 MG tablet, Take 1  tablet (100 mg total) by mouth once daily., Disp: 90 tablet, Rfl: 3    azelastine (ASTELIN) 137 mcg (0.1 %) nasal spray, 1 spray (137 mcg total) by Nasal route 2 (two) times daily., Disp: 90 mL, Rfl: 3    diazePAM (VALIUM) 5 MG tablet, TAKE 1 TABLET(5 MG) BY MOUTH EVERY NIGHT AS NEEDED FOR ANXIETY Strength: 5 mg, Disp: 30 tablet, Rfl: 5    EScitalopram oxalate (LEXAPRO) 20 MG tablet, Take 1 tablet (20 mg total) by mouth every evening., Disp: 90 tablet, Rfl: 3    FEROSUL 325 mg (65 mg iron) Tab tablet, TAKE 1 TABLET BY MOUTH ONCE DAILY, Disp: 90 tablet, Rfl: 0    finasteride (PROSCAR) 5 mg tablet, Take 1 tablet (5 mg total) by mouth once daily., Disp: 90 tablet, Rfl: 3    fluticasone propionate (FLONASE) 50 mcg/actuation nasal spray, SHAKE LIQUID AND USE 1 SPRAY(50 MCG) IN EACH NOSTRIL EVERY DAY, Disp: 48 g, Rfl: 0    furosemide (LASIX) 20 MG tablet, Take 1 tablet (20 mg total) by mouth once daily., Disp: 90 tablet, Rfl: 3    HYDROcodone-acetaminophen (NORCO)  mg per tablet, Take 1 tablet by mouth every 8 (eight) hours as needed for Pain., Disp: 20 tablet, Rfl: 0    latanoprost 0.005 % ophthalmic solution, Place 1 drop into both eyes every evening., Disp: , Rfl:     linaCLOtide (LINZESS) 145 mcg Cap capsule, Take 1 capsule (145 mcg total) by mouth before breakfast., Disp: 90 capsule, Rfl: 2    meloxicam (MOBIC) 15 MG tablet, TAKE 1 TABLET(15 MG) BY MOUTH DAILY AS NEEDED FOR PAIN OR BACK PAIN, Disp: 30 tablet, Rfl: 1    montelukast (SINGULAIR) 10 mg tablet, Take 1 tablet (10 mg total) by mouth every evening., Disp: 90 tablet, Rfl: 3    multivitamin (THERAGRAN) per tablet, Take 1 tablet by mouth once daily., Disp: , Rfl:     nicotine (NICODERM CQ) 21 mg/24 hr, Place 1 patch onto the skin once daily., Disp: 14 patch, Rfl: 0    nicotine polacrilex 4 MG Lozg, Take up to 6 pieces daily as needed (Patient taking differently: Take up to 6 pieces daily as needed), Disp: 72 lozenge, Rfl: 0    potassium chloride SA  (K-DUR,KLOR-CON) 20 MEQ tablet, Take 1 tablet (20 mEq total) by mouth once daily., Disp: 90 tablet, Rfl: 3    predniSONE (DELTASONE) 20 MG tablet, Take 1 tablet (20 mg total) by mouth once daily., Disp: 7 tablet, Rfl: 0    rosuvastatin (CRESTOR) 40 MG Tab, Take 1 tablet (40 mg total) by mouth every evening., Disp: 90 tablet, Rfl: 1    sildenafiL (VIAGRA) 100 MG tablet, Take 1 tablet (100 mg total) by mouth daily as needed for Erectile Dysfunction., Disp: 20 tablet, Rfl: 2    telmisartan-hydrochlorothiazide (MICARDIS HCT) 80-12.5 mg per tablet, Take 1 tablet by mouth once daily., Disp: 90 tablet, Rfl: 3    albuterol-ipratropium (DUO-NEB) 2.5 mg-0.5 mg/3 mL nebulizer solution, Take 3 mLs by nebulization every 6 (six) hours as needed for Wheezing. Rescue (Patient not taking: Reported on 11/16/2022), Disp: 120 vial, Rfl: 5    Past Medical History:   Diagnosis Date    Depression     Hyperlipidemia 5/8/2019    Hypertension     Seizures        Past Surgical History:   Procedure Laterality Date    COLONOSCOPY N/A 4/28/2022    Procedure: COLONOSCOPY;  Surgeon: Jordy Zamora MD;  Location: [facility];  Service: Endoscopy;  Laterality: N/A;    CORONARY ARTERY BYPASS GRAFT      LAMINECTOMY OF THORACIC SPINE BY POSTERIOR APPROACH USING COMPUTER-ASSISTED NAVIGATION  5/29/2020    Procedure: LAMINECTOMY, SPINE, THORACIC, POSTERIOR APPROACH, USING COMPUTER-ASSISTED NAVIGATION T7-9;  Surgeon: Elmer Connell MD;  Location: [facility] [place] OR;  Service: Neurosurgery;;    SURGICAL REMOVAL OF VERTEBRAL BODY OF THORACIC SPINE N/A 5/29/2020    Procedure: CORPECTOMY, SPINE, THORACIC - Partial T8 - T9 TRANSPEDICULAR/ COSTOTRANSVESECTOMY  T9;  Surgeon: Elmer Connell MD;  Location: [facility] [place] OR;  Service: Neurosurgery;  Laterality: N/A;    THORACIC LAMINECTOMY WITH FUSION N/A 5/29/2020    Procedure: LAMINECTOMY, SPINE, THORACIC WITH FUSION- T7-10;  Surgeon: Elmer Connell MD;  Location: [facility] [place] OR;  Service: Neurosurgery;  Laterality: N/A;       Family  "History   Problem Relation Age of Onset    Diabetes Mother     No Known Problems Father     Cancer Sister         pt does not know what kind    Diabetes Sister     No Known Problems Sister     No Known Problems Sister        Social History     Socioeconomic History    Marital status:    Tobacco Use    Smoking status: Every Day     Packs/day: 1.00     Years: 57.00     Pack years: 57.00     Types: Cigarettes    Smokeless tobacco: Never    Tobacco comments:     Age Started: 14   Substance and Sexual Activity    Alcohol use: Yes     Comment: ocassional        Review of patient's allergies indicates:  No Known Allergies    Review of Systems:  General ROS: negative for - fever  Psychological ROS: negative for - hostility  Hematological and Lymphatic ROS: negative for - bleeding problems  Endocrine ROS: negative for - unexpected weight changes  Respiratory ROS: no cough, shortness of breath, or wheezing  Cardiovascular ROS: no chest pain or dyspnea on exertion  Gastrointestinal ROS: no abdominal pain, change in bowel habits, or black or bloody stools  Musculoskeletal ROS:  Positive for - muscular weakness  Neurological ROS: negative for - bowel and bladder control changes or   Positive for-  numbness/tingling  Dermatological ROS: negative for rash    Physical Exam:  Vitals:    11/28/22 1356   BP: (!) 156/84   Pulse: 67   SpO2: 96%   Weight: 81.8 kg (180 lb 5.4 oz)   Height: 5' 4" (1.626 m)   PainSc:   7   PainLoc: Hip     Body mass index is 30.95 kg/m².     Gen: NAD  Gait:  Slow, steady for leaning gait  Psych:  Mood appropriate for given condition  HEENT: eyes anicteric   GI: Abd soft  CV: RRR  Lungs: breathing unlabored   ROM: limited AROM of the L spine in all planes, full ROM at ankles, knees and hips  Lumbar flexion 50 degrees, extension 30 degrees, side bending 30 degrees.    Sensation: intact to light touch in all dermatomes tested from L2-S1 bilaterally  Reflexes: 3+ left patella, 2+ right patella and 2+ b/l " achilles  Palpation: Diffusely tender over lumbar paraspinals  -TTP over the b/l greater trochanters and bilateral SI joint  Tone: normal in the b/l knees and hips   Skin: intact  Extremities: No edema in b/l ankles or hands       Right Left   L2/3 Iliacus Hip flexion  5-  5-   L3/4 Qudratus Femoris Knee Extension  5  5   L4/5 Tib Anterior Ankle Dorsiflexion   5  5   L5/S1 Extensor Hallicus Longus Great toe extension  5  5                 S1/S2 Gastroc/Soleus Plantar Flexion  5  5     Imaging:  Xray thoracic spine 11/6/20  FINDINGS:  Postsurgical changes of posterior instrumented fusion with bipedicular screws and vertical stabilization rods at T7-T10 again demonstrated.  Hardware appears well aligned without evidence of acute hardware complication.  Thoracic vertebral body heights appear maintained without evidence of acute fracture.  Multilevel degenerative disc disease with anterior marginal osteophyte formation, endplate sclerosis and disc space narrowing.  Postoperative changes of prior sternotomy and CABG procedure.  Calcific plaque projects over the aortic arch.    MRI lumbar spine 5/29/20  FINDINGS:  The lumbar vertebral body heights appear to be maintained.  The static anterior-posterior lumbar vertebral body alignment appears to be within normal limits.  Discogenic endplate degenerative signal changes are seen, most evident the L5-S1 level.  There also appears to be periarticular edema about the bilateral L5 facet joints with small amount of fluid signal intensity within the facet joints, likely related to active facet arthritis.  There appear to be varying degrees of mild disc desiccation throughout the visualized spine, most pronounced at L4-L5.    the conus medullaris terminate at approximately the L1 level.     L1-L2 demonstrates minimal broad-based disc bulge and mild bilateral facet arthrosis without significant overall central spinal canal or neural foraminal stenosis.  L2-L3 demonstrates mild  broad-based disc bulge and bilateral facet arthrosis without significant central spinal canal stenosis.  Mild bilateral lateral recess and neural foraminal narrowing is seen.  L3-L4 demonstrates mild broad-based disc bulge and mild bilateral facet arthrosis without significant central spinal canal stenosis.  Mild left greater than right lateral recess narrowing is seen.  Mild bilateral neural foraminal narrowing is noted.  L4-L5 demonstrates moderate disc space narrowing, severe broad-based disc bulge, superimposed right paracentral to central disc extrusion which extends approximately 6 mm cranially, ligamentum flavum hypertrophy, and severe facet arthrosis.  Moderate central spinal canal stenosis is seen with the AP diameter of the central thecal sac measuring approximately 7 mm.  Severe bilateral lateral recess and severe left neural foraminal narrowing is seen.  Moderate to severe right neural foraminal narrowing is noted.  L5-S1 demonstrates moderate disc space narrowing, severe broad-based disc bulge slightly asymmetric to the right, superimposed right paracentral annular fissure, and severe bilateral facet arthrosis.  Moderate central spinal canal stenosis is seen with AP diameter of the central thecal sac measuring approximately 7 mm.  Severe bilateral lateral recess and neural foraminal narrowing is seen.     There appear to be small T2 hyperintense foci in both kidneys which are incompletely evaluated on the current study, statistically likely reflective of cysts.  This could be confirmed with renal ultrasound.    Labs:  BMP  Lab Results   Component Value Date     09/29/2022    K 3.9 09/29/2022     09/29/2022    CO2 23 09/29/2022    BUN 22 09/29/2022    CREATININE 1.2 09/29/2022    CALCIUM 9.9 09/29/2022    ANIONGAP 13 09/29/2022    ESTGFRAFRICA >60.0 09/29/2021    EGFRNONAA >60.0 09/29/2021     Lab Results   Component Value Date    ALT 21 09/29/2022    AST 20 09/29/2022    ALKPHOS 60  09/29/2022    BILITOT 0.5 09/29/2022       Assessment:  Problem List Items Addressed This Visit    None  Visit Diagnoses       Lumbar radiculopathy    -  Primary    Chronic pain syndrome        Relevant Orders    Pain Clinic Drug Screen    Lumbar spondylosis        Spinal stenosis of lumbar region, unspecified whether neurogenic claudication present                  Treatment Plan:  72 y.o. year old male with PMH HTN, CAD presents to the office with back pain     11/28/2022: Alexis Medrano Jr. returns to the clinic for follow up.  He returns for follow-up with complaints of worsening lower back pain, 7/10, constant, worse with activities.  His pain is somewhat alleviated with rest.  He states it starts in his lower back and radiates down bilateral legs stopping at his knees.  He reports occasional feelings of weakness and numbness and tingling in his right foot.  He denies any bowel or bladder incontinence.  He reports going to formal physical therapy over the past 2 years since his previous spinal surgery with some improvement of his pain but not complete.     - on exam he has 5-/5 bilateral hip flexion, otherwise he is full strength in his lower extremities.  - I independently reviewed his lumbar MRI and is consistent with L4-L5 demonstrates moderate disc space narrowing, severe broad-based disc bulge, superimposed right paracentral to central disc extrusion which extends approximately 6 mm cranially,  Moderate central spinal canal stenosis is seen with the AP diameter of the central thecal sac measuring approximately 7 mm.  Severe bilateral lateral recess and severe left neural foraminal narrowing is seen. L5-S1  severe broad-based disc bulge slightly asymmetric to the right, superimposed right paracentral annular fissure,  Moderate central spinal canal stenosis Severe bilateral lateral recess and neural foraminal narrowing is seen.  - his pain is limiting his mobility interfering with his quality of life  - he has  completed formal physical therapy in the past.  Offered more physical therapy however he is not interested in this time.  - I also offered potential interventional procedures such as epidural which he is also not interested in at this time.  - today he is requesting pain medication to take chronically.   - previously Dr. Hong was considering potentially trialing a low-dose opiate.  He recommended the patient discontinue taking benzodiazepines and also see a psychiatrist.  - I will discuss his case with Dr. Hong with regards to a trial of pain medication.  - patient states he no longer drinks alcohol.  Patient states he will also discontinue his daily diazepam.   - patient is agreeable to not drinking alcohol and discontinuing benzodiazepines in order to be provided opiates for his pain relief.  - UDS collected today      : Reviewed    The above note was completed, in part, with the aid of Dragon dictation software/hardware. Translation errors may be present.    The total time spent for evaluation and management on 11/28/2022 including reviewing separately obtained history, performing a medically appropriate exam and evaluation, documenting clinical information in the health record, independently interpreting results and communicating them to the patient/family/caregiver, and ordering medications/tests/procedures was between 30-39 minutes.

## 2022-11-28 NOTE — TELEPHONE ENCOUNTER
----- Message from Jhoana Varghese sent at 11/28/2022  8:35 AM CST -----  Contact: selfsel  Type:  RX Refill Request    Who Called:  self  Refill or New Rx: refill  RX Name and Strength:  diazePAM (VALIUM) 5 MG tablet    How is the patient currently taking it? (ex. 1XDay):  as directed  Is this a 30 day or 90 day RX:  30  Preferred Pharmacy with phone number:    [id number] - [address] [address]  Phone: [phone] Fax: [phone]  Local or Mail Order:  local  Ordering Provider:  Dr. Melania Marie Call Back Number:  [phone] (home0  Additional Information:  Please call patient back regarding medicine Thanks

## 2022-11-29 DIAGNOSIS — R60.0 LOCALIZED EDEMA: ICD-10-CM

## 2022-11-29 RX ORDER — POTASSIUM CHLORIDE 20 MEQ/1
20 TABLET, EXTENDED RELEASE ORAL DAILY
Qty: 90 TABLET | Refills: 3 | Status: SHIPPED | OUTPATIENT
Start: 2022-11-29 | End: 2023-06-29 | Stop reason: SDUPTHER

## 2022-11-29 NOTE — TELEPHONE ENCOUNTER
No new care gaps identified.  [facility] Embedded Care Gaps. Reference number: [id number]. 11/29/2022   2:04:43 PM CST   normal (ped)... In no apparent distress, appears well developed and well nourished.

## 2022-11-29 NOTE — TELEPHONE ENCOUNTER
Rx sent in on 9/29/22 with 5 refills. No refill appropriate right now.     Attempted to call pt, no answer left voice mail to return call.

## 2022-11-30 DIAGNOSIS — Z98.890 S/P LUMBAR SPINE OPERATION: ICD-10-CM

## 2022-11-30 DIAGNOSIS — F41.9 ANXIETY: ICD-10-CM

## 2022-11-30 RX ORDER — DIAZEPAM 5 MG/1
TABLET ORAL
Qty: 30 TABLET | Refills: 5 | OUTPATIENT
Start: 2022-11-30

## 2022-11-30 NOTE — TELEPHONE ENCOUNTER
No new care gaps identified.  [facility] Embedded Care Gaps. Reference number: [id number]. 11/30/2022   9:43:51 AM CST

## 2022-11-30 NOTE — TELEPHONE ENCOUNTER
Refill Decision Note   Alexis Medrano  is requesting a refill authorization.  Brief Assessment and Rationale for Refill:  Approve     Medication Therapy Plan:       Medication Reconciliation Completed: No   Comments:     No Care Gaps recommended.     Note composed:6:41 PM 11/29/2022

## 2022-12-04 LAB
6MAM UR QL: NOT DETECTED
7AMINOCLONAZEPAM UR QL: NOT DETECTED
A-OH ALPRAZ UR QL: NOT DETECTED
ALPHA-OH-MIDAZOLAM: NOT DETECTED
ALPRAZ UR QL: NOT DETECTED
AMPHET UR QL SCN: NOT DETECTED
ANNOTATION COMMENT IMP: NORMAL
ANNOTATION COMMENT IMP: NORMAL
BARBITURATES UR QL: NOT DETECTED
BUPRENORPHINE UR QL: NOT DETECTED
BZE UR QL: NOT DETECTED
CARBOXYTHC UR QL: PRESENT
CARISOPRODOL UR QL: NOT DETECTED
CLONAZEPAM UR QL: NOT DETECTED
CODEINE UR QL: NOT DETECTED
CREAT UR-MCNC: 118.2 MG/DL (ref 20–400)
DIAZEPAM UR QL: NOT DETECTED
ETHYL GLUCURONIDE UR QL: PRESENT
FENTANYL UR QL: NOT DETECTED
GABAPENTIN: NOT DETECTED
HYDROCODONE UR QL: NOT DETECTED
HYDROMORPHONE UR QL: PRESENT
LORAZEPAM UR QL: NOT DETECTED
MDA UR QL: NOT DETECTED
MDEA UR QL: NOT DETECTED
MDMA UR QL: NOT DETECTED
ME-PHENIDATE UR QL: NOT DETECTED
METHADONE UR QL: NOT DETECTED
METHAMPHET UR QL: NOT DETECTED
MIDAZOLAM UR QL SCN: NOT DETECTED
MORPHINE UR QL: NOT DETECTED
NALOXONE: NOT DETECTED
NORBUPRENORPHINE UR QL CFM: NOT DETECTED
NORDIAZEPAM UR QL: PRESENT
NORFENTANYL UR QL: NOT DETECTED
NORHYDROCODONE UR QL CFM: PRESENT
NORMEPERIDINE UR QL CFM: NOT DETECTED
NOROXYCODONE UR QL CFM: PRESENT
NOROXYMORPHONE UR QL SCN: NOT DETECTED
OXAZEPAM UR QL: PRESENT
OXYCODONE UR QL: PRESENT
OXYMORPHONE UR QL: PRESENT
PATHOLOGY STUDY: NORMAL
PCP UR QL: NOT DETECTED
PHENTERMINE UR QL: NOT DETECTED
PREGABALIN: NOT DETECTED
SERVICE CMNT-IMP: NORMAL
TAPENTADOL UR QL SCN: NOT DETECTED
TAPENTADOL UR QL SCN: NOT DETECTED
TEMAZEPAM UR QL: PRESENT
TRAMADOL UR QL: NOT DETECTED
ZOLPIDEM METABOLITE: NOT DETECTED
ZOLPIDEM UR QL: NOT DETECTED

## 2022-12-05 ENCOUNTER — TELEPHONE (OUTPATIENT)
Dept: PAIN MEDICINE | Facility: CLINIC | Age: 72
End: 2022-12-05
Payer: MEDICARE

## 2022-12-05 NOTE — TELEPHONE ENCOUNTER
Please call and let patient know we have reviewed the results of his urine drug screen.    His UDS is consistent with hydrocodone and diazepam that is prescribed to him from his PCP.    However, his UDS is also consistent with oxycodone, which it does not appear that he has ever been prescribed.  It is also positive for alcohol and marijuana.    Based on these results, we will not be able to prescribe him any opiates.

## 2022-12-06 ENCOUNTER — PES CALL (OUTPATIENT)
Dept: ADMINISTRATIVE | Facility: CLINIC | Age: 72
End: 2022-12-06
Payer: MEDICARE

## 2022-12-06 DIAGNOSIS — M54.50 CHRONIC BILATERAL LOW BACK PAIN WITHOUT SCIATICA: ICD-10-CM

## 2022-12-06 DIAGNOSIS — G89.29 CHRONIC BILATERAL LOW BACK PAIN WITHOUT SCIATICA: ICD-10-CM

## 2022-12-06 RX ORDER — HYDROCODONE BITARTRATE AND ACETAMINOPHEN 10; 325 MG/1; MG/1
1 TABLET ORAL EVERY 8 HOURS PRN
Qty: 20 TABLET | Refills: 0 | OUTPATIENT
Start: 2022-12-06

## 2022-12-06 NOTE — TELEPHONE ENCOUNTER
No new care gaps identified.  [facility] Embedded Care Gaps. Reference number: [id number]. 12/06/2022   3:36:15 PM CST

## 2022-12-06 NOTE — TELEPHONE ENCOUNTER
----- Message from Keenan Obrien sent at 12/6/2022  2:43 PM CST -----  Type: Needs Medical Advice  Who Called:  Patient    Pharmacy name and phone #:    [id number] - [place] - [address] AT [facility] & [address]  [address]  [facility] [facility] [place] [address]  Phone: [phone] Fax: [phone]        Best Call Back Number: [phone]  Additional Information: Patient states that his medication isn't at the pharmacy:  HYDROcodone-acetaminophen (NORCO)  mg per tablet    Please call to advise

## 2022-12-14 ENCOUNTER — TELEPHONE (OUTPATIENT)
Dept: FAMILY MEDICINE | Facility: CLINIC | Age: 72
End: 2022-12-14
Payer: MEDICARE

## 2022-12-14 NOTE — TELEPHONE ENCOUNTER
Attempted to call pt, left message on voicemail to return call to clinic.     See message from Ovidio on 12/5.

## 2022-12-14 NOTE — TELEPHONE ENCOUNTER
----- Message from Jhoana Kwong sent at 12/13/2022  3:01 PM CST -----  Regarding: needs call back  Type: Needs Medical Advice  Who Called:  Alexis Marie Call Back Number: [phone]        Additional Information: Pt called stating he got some tests done and has not received his results, he also has some questions regarding his medications. He asked to please give him a call as soon as you can. Thanks!

## 2022-12-19 ENCOUNTER — TELEPHONE (OUTPATIENT)
Dept: FAMILY MEDICINE | Facility: CLINIC | Age: 72
End: 2022-12-19

## 2022-12-19 ENCOUNTER — TELEPHONE (OUTPATIENT)
Dept: PAIN MEDICINE | Facility: CLINIC | Age: 72
End: 2022-12-19
Payer: MEDICARE

## 2022-12-19 NOTE — TELEPHONE ENCOUNTER
----- Message from Theodora Merrill sent at 12/19/2022  2:27 PM CST -----  Contact: patient  Type:  Needs Medical Advice    Who Called:  Patient      Would the patient rather a call back or a response via [facility]?  Call    Best Call Back Number:  [phone] (home) [phone] (work)    Additional Information:  Patient is asking to speak to Dr Velázquez because the patient is stating he nurse said they found alcohol in his system and he states he does not drink and he wants to speak to Dr Velázquez     Please call to advise

## 2022-12-19 NOTE — TELEPHONE ENCOUNTER
"Please reference 11/28 PO[facility] Pain Management Drug Screen result.     Call placed to patient.   He reports no alcohol, and that he would not lie about that. And he feels bad about it being on his clinic drug screen.   He doesn't want Dr Velázquez thinking bad of him. He states pain management "kicked him to the curb".    Went over the substances flagged in 11/28 drug screen.  He states that "if they would lie about the alcohol, they would lie about the other stuff". But he focuses his defense to use of alcohol and not the other substances.   I shared throw the drug test worked and how little staff have control over the result.  I shared with him that the [facility] and [facility] are very strict about prescribing pain medications for his safety. Discussed that those providing narcotics MUST know if any other narcotic medications are being taken to prevent overdose and/or interactions. We also reviewed that only ONE physician should be prescribing narcotic medication, and that he signs an agreement with that doctor. He voices his understanding, and continues to defend himself regarding the alcohol showing up on the test.     Shared that we would send a message to Dr Velázquez, but advised him to be prepared that the hydrocodone may be "out" as a pain management option given the drug screen results.  Explained that Dr Velázquez does want to help him, and we would ask for her input on next steps.     Voiced understanding, sharing that he feels better since he talked to me.   "

## 2022-12-19 NOTE — TELEPHONE ENCOUNTER
Notified pt of UDS results and that we will no longer be prescribing pain medication. Pt verbalized understanding.

## 2022-12-19 NOTE — TELEPHONE ENCOUNTER
----- Message from Sigrid Simmons sent at 12/19/2022 10:54 AM CST -----  Contact: patient  Type: Needs Medical Advice  Who Called:  patient  Best Call Back Number: [phone]   Additional Information: requesting a call back regarding pain

## 2023-01-07 ENCOUNTER — NURSE TRIAGE (OUTPATIENT)
Dept: ADMINISTRATIVE | Facility: CLINIC | Age: 73
End: 2023-01-07
Payer: MEDICARE

## 2023-01-07 NOTE — TELEPHONE ENCOUNTER
Patient c/o 8/10 back pain. Also has numbness to his right leg and rectum. Patient is inquiring about a prescription for pain medication. Advised per protocol to go to the nearest ED now for further evaluation. Advised the patient to call back with any further questions or if symptoms worsen.        Reason for Disposition   Numbness in groin or rectal area (i.e., loss of sensation)    Additional Information   Negative: Passed out (i.e., lost consciousness, collapsed and was not responding)   Negative: Shock suspected (e.g., cold/pale/clammy skin, too weak to stand, low BP, rapid pulse)   Negative: Sounds like a life-threatening emergency to the triager   Negative: [1] SEVERE back pain (e.g., excruciating) AND [2] sudden onset AND [3] age > 60 years   Negative: [1] Unable to urinate (or only a few drops) > 4 hours AND [2] bladder feels very full (e.g., palpable bladder or strong urge to urinate)   Negative: [1] Loss of bladder or bowel control (urine or bowel incontinence; wetting self, leaking stool) AND [2] new-onset    Protocols used: Back Pain-A-    
home

## 2023-01-13 ENCOUNTER — TELEPHONE (OUTPATIENT)
Dept: FAMILY MEDICINE | Facility: CLINIC | Age: 73
End: 2023-01-13
Payer: MEDICARE

## 2023-01-13 DIAGNOSIS — M54.41 CHRONIC BILATERAL LOW BACK PAIN WITH BILATERAL SCIATICA: Primary | ICD-10-CM

## 2023-01-13 DIAGNOSIS — G89.29 CHRONIC BILATERAL LOW BACK PAIN WITH BILATERAL SCIATICA: Primary | ICD-10-CM

## 2023-01-13 DIAGNOSIS — M54.42 CHRONIC BILATERAL LOW BACK PAIN WITH BILATERAL SCIATICA: Primary | ICD-10-CM

## 2023-01-13 NOTE — TELEPHONE ENCOUNTER
----- Message from Jose Angel Taylor sent at 8/13/2020  8:32 AM CDT -----  Contact: Indu with [facility]  Type: Needs Medical Advice  Who Called:  Indu    Best Call Back Number: [phone]  Additional Information: returning call about referral for pt. Pt is wanting outpatient therapy.        Discussed lid hygiene, warm compress and eyelid wash.

## 2023-01-13 NOTE — TELEPHONE ENCOUNTER
Spoke to pt and he stated he will see another pain mgmt dr if you will refer. He still is claiming no alcohol, but will be willing to see diff r. Please advise.

## 2023-01-13 NOTE — TELEPHONE ENCOUNTER
----- Message from Marian Mcclain sent at 1/12/2023 10:46 AM CST -----  Regarding: patient request  Contact: patient  Name of Who is Calling: KAY MANZO JR. [[id number]]      What is the request in detail: Patient is requesting a call back to check on the status of a prescription he requested and also requesting medical advice. Patient stated he has been in pain for a while and would like to know what he can take for it. Please advise!         Can the clinic reply by [facility]: NO        What Number to Call Back if not in [facility]: [phone]

## 2023-01-14 NOTE — TELEPHONE ENCOUNTER
Spoke with patient , advised him that the referral for pain management with Dr. Mathews has been placed and faxed over to their office. He will have to call on Tuesday to get an appointment scheduled. Patient verbalized understanding.

## 2023-01-16 ENCOUNTER — NURSE TRIAGE (OUTPATIENT)
Dept: ADMINISTRATIVE | Facility: CLINIC | Age: 73
End: 2023-01-16
Payer: MEDICARE

## 2023-01-16 NOTE — TELEPHONE ENCOUNTER
Had a spinal fusion 3 days ago and have trouble when it gets real cold and they don't want to give him anything for pain. Was told that he could not get pain medication because he had alcohol in his urine. Would like to speak with someone about the office not giving him his pain medication prescription. Advised to contact patient relations on Tuesday morning.  Reason for Disposition   [1] Follow-up call to recent contact AND [2] information only call, no triage required    Protocols used: Information Only Call - No Triage-A-

## 2023-01-18 ENCOUNTER — TELEPHONE (OUTPATIENT)
Dept: FAMILY MEDICINE | Facility: CLINIC | Age: 73
End: 2023-01-18
Payer: MEDICARE

## 2023-01-19 NOTE — TELEPHONE ENCOUNTER
Handled by provider in separate encounter.   The patient does not have portal access therefore cannot complete a virtual appoitnment.

## 2023-01-19 NOTE — TELEPHONE ENCOUNTER
----- Message from Gabriela Martínez sent at 1/18/2023 12:14 PM CST -----  Regarding: virtual appt  Contact: patient  Type: Needs Medical Advice  Who Called:  patient  Symptoms (please be specific):  back and hip pain  How long has patient had these symptoms:    Pharmacy name and phone #:    Best Call Back Number: [phone] (home) [phone] (work)    Additional Information: Please call patient for a virtual appt. Please and thank you!

## 2023-01-21 ENCOUNTER — NURSE TRIAGE (OUTPATIENT)
Dept: ADMINISTRATIVE | Facility: CLINIC | Age: 73
End: 2023-01-21
Payer: MEDICARE

## 2023-01-21 NOTE — TELEPHONE ENCOUNTER
Patient states he is s/p Spinal Fusion x 3 years ago. Patient states he has been refused pain medication (Hydrocodone) by Pain Management Provider and his PCP. Patient's last prescription for Hydrocodone-Acetaminophen  mg was in November, 2022, with No Refills.    Patient states c/o lower back pain and cold symptoms. Patient rates his back pain a 7/10 and it wakes him up out of his sleep.    Care Advice given to Go to ED Now for evaluation/assessment. Patient states understanding of care advice and cites no additional concerns at this time.       Reason for Disposition   Numbness in groin or rectal area (i.e., loss of sensation)    Additional Information   Negative: Passed out (i.e., lost consciousness, collapsed and was not responding)   Negative: Shock suspected (e.g., cold/pale/clammy skin, too weak to stand, low BP, rapid pulse)   Negative: Sounds like a life-threatening emergency to the triager   Negative: Major injury to the back (e.g., MVA, fall > 10 feet or 3 meters, penetrating injury, etc.)   Negative: Followed a tailbone injury   Negative: [1] Pain in the upper back over the ribs (rib cage) AND [2] radiates (travels, goes) into chest   Negative: [1] Pain in the upper back over the ribs (rib cage) AND [2] worsened by coughing (or clearly increases with breathing)   Negative: Back pain during pregnancy   Negative: Pain mainly in flank (i.e., in the side, over the lower ribs or just below the ribs)   Negative: [1] SEVERE back pain (e.g., excruciating) AND [2] sudden onset AND [3] age > 60 years   Negative: [1] Unable to urinate (or only a few drops) > 4 hours AND [2] bladder feels very full (e.g., palpable bladder or strong urge to urinate)   Negative: [1] Loss of bladder or bowel control (urine or bowel incontinence; wetting self, leaking stool) AND [2] new-onset    Protocols used: Back Pain-A-

## 2023-01-23 ENCOUNTER — PES CALL (OUTPATIENT)
Dept: ADMINISTRATIVE | Facility: CLINIC | Age: 73
End: 2023-01-23
Payer: MEDICARE

## 2023-01-26 DIAGNOSIS — R09.89 RUNNY NOSE: ICD-10-CM

## 2023-01-26 RX ORDER — AZELASTINE 1 MG/ML
1 SPRAY, METERED NASAL 2 TIMES DAILY
Qty: 90 ML | Refills: 3 | Status: ON HOLD | OUTPATIENT
Start: 2023-01-26 | End: 2024-03-20 | Stop reason: HOSPADM

## 2023-01-26 NOTE — TELEPHONE ENCOUNTER
----- Message from Marian Mcclain sent at 1/26/2023  9:32 AM CST -----  Regarding: patient request  Contact: patient  Please refill the medication(s) listed below. Please call the patient when the prescription(s) is ready for  at this phone number     KAY MANZO JR. [[id number]]  [phone]         Medication #1 Patient is requesting a prescription for back pain. Please advise!        Preferred Pharmacy:    [id number] - [place] [address] [place] [address]  Phone: [phone] Fax: [phone]

## 2023-01-26 NOTE — TELEPHONE ENCOUNTER
No new care gaps identified.  [facility] Embedded Care Gaps. Reference number: [id number]. 1/26/2023   9:16:32 AM CST

## 2023-01-26 NOTE — TELEPHONE ENCOUNTER
----- Message from Cirilo Blackburn sent at 1/25/2023 12:27 PM CST -----  Type:  RX Refill Request    Who Called: [employer]     Refill or New Rx: Refil     RX Name and Strength:azelastine (ASTELIN) 137 mcg (0.1 %) nasal spray    How is the patient currently taking it? Sig - Route: 1 spray (137 mcg total) by Nasal route 2 (two) times daily. - Nasal  Sent to pharmacy as: azelastine (ASTELIN) 137 mcg (0.1 %) nasal spray        Is this a 30 day or 90 day RX:    Preferred Pharmacy with phone number:[facility] - [place], [place] - [address]    Local or Mail Order: Local     Ordering Provider:    Would the patient rather a call back or a response via [facility]?  Call Back     Best Call Back Number: [phone] (mobile)     Additional Information:

## 2023-01-31 ENCOUNTER — TELEPHONE (OUTPATIENT)
Dept: FAMILY MEDICINE | Facility: CLINIC | Age: 73
End: 2023-01-31
Payer: MEDICARE

## 2023-01-31 NOTE — TELEPHONE ENCOUNTER
----- Message from Alyce Castle sent at 1/31/2023 10:38 AM CST -----  Regarding: pt call back requested  Name of Who is Calling:KAY MANZO JR. [[id number]]          What is the request in detail: pt call back requested           Can the clinic reply by [facility]:no          [facility] [phone] (home) [phone](work)

## 2023-01-31 NOTE — TELEPHONE ENCOUNTER
----- Message from Teodora Baker, Patient Care Assistant sent at 1/31/2023  3:20 PM CST -----  Regarding: returning call  Contact: pt  Type:  Patient Returning Call    Who Called:  pt     Who Left Message for Patient:  not sure     Does the patient know what this is regarding?:  yes     Best Call Back Number:  [phone] (home) [phone] (work)    Additional Information:  please call pt to advise. Thanks!

## 2023-01-31 NOTE — TELEPHONE ENCOUNTER
----- Message from Yue Nguyen sent at 1/31/2023 10:49 AM CST -----  Who Called:PT       What is the reqeust in detail: is requesting a call back from a missed call from emiliano .please advise       Can the clinic reply by [facility]? No       Best Call Back Number: [phone]         Additional Information:

## 2023-02-13 ENCOUNTER — PES CALL (OUTPATIENT)
Dept: ADMINISTRATIVE | Facility: CLINIC | Age: 73
End: 2023-02-13
Payer: MEDICARE

## 2023-02-17 ENCOUNTER — TELEPHONE (OUTPATIENT)
Dept: PAIN MEDICINE | Facility: CLINIC | Age: 73
End: 2023-02-17
Payer: MEDICARE

## 2023-02-17 NOTE — TELEPHONE ENCOUNTER
----- Message from Josiah Hanna sent at 2/17/2023  9:08 AM CST -----  Contact: pt at [phone]  Type: Needs Medical Advice  Who Called:  pt  Best Call Back Number: [phone]  Additional Information: pt is calling the office to schedule a televisit. Please call back and advise.

## 2023-02-17 NOTE — TELEPHONE ENCOUNTER
Telephone visit is fine.  However, we are not going to be prescribing chronic pain medications for him.  If he would like to discuss alternative options such as physical therapy or potential interventional procedures that will be fine.

## 2023-02-17 NOTE — TELEPHONE ENCOUNTER
Left msg on machine that I am returning his call. There is a previous msg in the chart where pt is asking for referral to Dr Mathews as well.    bam

## 2023-02-23 ENCOUNTER — NURSE TRIAGE (OUTPATIENT)
Dept: ADMINISTRATIVE | Facility: CLINIC | Age: 73
End: 2023-02-23
Payer: MEDICARE

## 2023-02-23 NOTE — TELEPHONE ENCOUNTER
Call x 1 LVM, call x 2 no answer.   Reason for Disposition   Second attempt to contact caller AND no contact made. Phone number verified.    Additional Information   Negative: Caller has already spoken with the PCP and has no further questions.   Negative: Caller has already spoken with another triager and has no further questions.   Negative: Caller has already spoken with another triager or PCP AND has further questions AND triager able to answer questions.   Negative: Busy signal.  First attempt to contact caller.  Follow-up call scheduled within 15 minutes.   Negative: No answer.  First attempt to contact caller.  Follow-up call scheduled within 15 minutes.   Negative: Message left on identified voice mail   Negative: Message left on unidentified voice mail.  Phone number verified.   Negative: Message left with person in household.   Negative: Wrong number reached.  Phone number verified.    Protocols used: No Contact or Duplicate Contact Call-A-

## 2023-02-27 ENCOUNTER — TELEPHONE (OUTPATIENT)
Dept: PAIN MEDICINE | Facility: CLINIC | Age: 73
End: 2023-02-27
Payer: MEDICARE

## 2023-02-27 NOTE — TELEPHONE ENCOUNTER
----- Message from Skye Hardy sent at 2/27/2023  9:27 AM CST -----  Contact: patient  Type:  Patient Returning Call    Who Called: patient     Who Left Message for Patient: n/a     Does the patient know what this is regarding?: yes     Would the patient rather a call back or a response via [facility]? Call     Best Call Back Number: [phone]    Additional Information:

## 2023-02-27 NOTE — TELEPHONE ENCOUNTER
Spoke with pt - advised that we can set up appt to discuss pain and PT, EULA, etc, however he refused and requested pain medication. Advised that we will no longer be prescribing pain medication and can send him a list of external providers. He agreed and will wait for list to arrive in the mail.

## 2023-03-28 DIAGNOSIS — Z98.890 S/P LUMBAR SPINE OPERATION: ICD-10-CM

## 2023-03-28 DIAGNOSIS — F41.9 ANXIETY: ICD-10-CM

## 2023-03-28 RX ORDER — DIAZEPAM 5 MG/1
TABLET ORAL
Qty: 30 TABLET | OUTPATIENT
Start: 2023-03-28

## 2023-03-28 NOTE — TELEPHONE ENCOUNTER
No new care gaps identified.  [facility] Embedded Care Gaps. Reference number: [id number]. 3/28/2023   10:37:31 AM KIKET

## 2023-03-29 NOTE — TELEPHONE ENCOUNTER
----- Message from Walter Jaime sent at 3/29/2023  2:27 PM CDT -----  Contact: self  Type:  RX Refill Request    Who Called:  pt  Refill or New Rx:  refill  RX Name and Strength:  diazePAM (VALIUM) 5 MG tablet  How is the patient currently taking it? (ex. 1XDay):  as prescribed  Is this a 30 day or 90 day RX:  n/a  Preferred Pharmacy with phone number:    [facility][id number] [address] [address]  Phone: [phone] Fax: [phone]            Local or Mail Order:  local  Ordering Provider:  Dr Melania Marie Call Back Number:  [phone]

## 2023-03-30 RX ORDER — DIAZEPAM 5 MG/1
TABLET ORAL
Qty: 10 TABLET | Refills: 0 | Status: SHIPPED | OUTPATIENT
Start: 2023-03-30 | End: 2023-04-10 | Stop reason: SDUPTHER

## 2023-04-05 ENCOUNTER — PES CALL (OUTPATIENT)
Dept: ADMINISTRATIVE | Facility: CLINIC | Age: 73
End: 2023-04-05
Payer: MEDICARE

## 2023-04-10 ENCOUNTER — OFFICE VISIT (OUTPATIENT)
Dept: FAMILY MEDICINE | Facility: CLINIC | Age: 73
End: 2023-04-10
Payer: MEDICARE

## 2023-04-10 VITALS
BODY MASS INDEX: 31.52 KG/M2 | HEIGHT: 64 IN | WEIGHT: 184.63 LBS | SYSTOLIC BLOOD PRESSURE: 118 MMHG | DIASTOLIC BLOOD PRESSURE: 74 MMHG | OXYGEN SATURATION: 97 % | HEART RATE: 61 BPM

## 2023-04-10 DIAGNOSIS — R73.09 ABNORMAL GLUCOSE: ICD-10-CM

## 2023-04-10 DIAGNOSIS — M25.552 CHRONIC PAIN OF BOTH HIPS: ICD-10-CM

## 2023-04-10 DIAGNOSIS — M54.41 CHRONIC BILATERAL LOW BACK PAIN WITH BILATERAL SCIATICA: ICD-10-CM

## 2023-04-10 DIAGNOSIS — I73.9 PAD (PERIPHERAL ARTERY DISEASE): ICD-10-CM

## 2023-04-10 DIAGNOSIS — F32.4 MAJOR DEPRESSIVE DISORDER, SINGLE EPISODE, IN PARTIAL REMISSION: Primary | ICD-10-CM

## 2023-04-10 DIAGNOSIS — G89.29 CHRONIC PAIN OF BOTH HIPS: ICD-10-CM

## 2023-04-10 DIAGNOSIS — Z98.890 S/P LUMBAR SPINE OPERATION: ICD-10-CM

## 2023-04-10 DIAGNOSIS — G89.29 CHRONIC BILATERAL LOW BACK PAIN WITH BILATERAL SCIATICA: ICD-10-CM

## 2023-04-10 DIAGNOSIS — I10 ESSENTIAL HYPERTENSION: ICD-10-CM

## 2023-04-10 DIAGNOSIS — H93.8X3 PRESSURE SENSATION IN BOTH EARS: ICD-10-CM

## 2023-04-10 DIAGNOSIS — I25.708 CORONARY ARTERY DISEASE OF BYPASS GRAFT OF NATIVE HEART WITH STABLE ANGINA PECTORIS: ICD-10-CM

## 2023-04-10 DIAGNOSIS — M25.551 CHRONIC PAIN OF BOTH HIPS: ICD-10-CM

## 2023-04-10 DIAGNOSIS — H61.21 IMPACTED CERUMEN OF RIGHT EAR: ICD-10-CM

## 2023-04-10 DIAGNOSIS — F41.9 ANXIETY: ICD-10-CM

## 2023-04-10 DIAGNOSIS — J43.8 OTHER EMPHYSEMA: ICD-10-CM

## 2023-04-10 DIAGNOSIS — R25.2 LEG CRAMPING: ICD-10-CM

## 2023-04-10 DIAGNOSIS — F17.200 TOBACCO DEPENDENCE: ICD-10-CM

## 2023-04-10 DIAGNOSIS — M54.42 CHRONIC BILATERAL LOW BACK PAIN WITH BILATERAL SCIATICA: ICD-10-CM

## 2023-04-10 DIAGNOSIS — R20.0 NUMBNESS: ICD-10-CM

## 2023-04-10 PROCEDURE — 69210 REMOVE IMPACTED EAR WAX UNI: CPT | Mod: RT,S$GLB,, | Performed by: FAMILY MEDICINE

## 2023-04-10 PROCEDURE — 3074F SYST BP LT 130 MM HG: CPT | Mod: CPTII,S$GLB,, | Performed by: FAMILY MEDICINE

## 2023-04-10 PROCEDURE — 99999 PR PBB SHADOW E&M-EST. PATIENT-LVL V: ICD-10-PCS | Mod: PBBFAC,,, | Performed by: FAMILY MEDICINE

## 2023-04-10 PROCEDURE — 3078F DIAST BP <80 MM HG: CPT | Mod: CPTII,S$GLB,, | Performed by: FAMILY MEDICINE

## 2023-04-10 PROCEDURE — 99214 OFFICE O/P EST MOD 30 MIN: CPT | Mod: 25,S$GLB,, | Performed by: FAMILY MEDICINE

## 2023-04-10 PROCEDURE — 1101F PR PT FALLS ASSESS DOC 0-1 FALLS W/OUT INJ PAST YR: ICD-10-PCS | Mod: CPTII,S$GLB,, | Performed by: FAMILY MEDICINE

## 2023-04-10 PROCEDURE — 3074F PR MOST RECENT SYSTOLIC BLOOD PRESSURE < 130 MM HG: ICD-10-PCS | Mod: CPTII,S$GLB,, | Performed by: FAMILY MEDICINE

## 2023-04-10 PROCEDURE — 1101F PT FALLS ASSESS-DOCD LE1/YR: CPT | Mod: CPTII,S$GLB,, | Performed by: FAMILY MEDICINE

## 2023-04-10 PROCEDURE — 3008F PR BODY MASS INDEX (BMI) DOCUMENTED: ICD-10-PCS | Mod: CPTII,S$GLB,, | Performed by: FAMILY MEDICINE

## 2023-04-10 PROCEDURE — 3008F BODY MASS INDEX DOCD: CPT | Mod: CPTII,S$GLB,, | Performed by: FAMILY MEDICINE

## 2023-04-10 PROCEDURE — 3288F PR FALLS RISK ASSESSMENT DOCUMENTED: ICD-10-PCS | Mod: CPTII,S$GLB,, | Performed by: FAMILY MEDICINE

## 2023-04-10 PROCEDURE — 1125F AMNT PAIN NOTED PAIN PRSNT: CPT | Mod: CPTII,S$GLB,, | Performed by: FAMILY MEDICINE

## 2023-04-10 PROCEDURE — 99499 UNLISTED E&M SERVICE: CPT | Mod: S$GLB,,, | Performed by: FAMILY MEDICINE

## 2023-04-10 PROCEDURE — 99499 RISK ADDL DX/OHS AUDIT: ICD-10-PCS | Mod: S$GLB,,, | Performed by: FAMILY MEDICINE

## 2023-04-10 PROCEDURE — 1159F PR MEDICATION LIST DOCUMENTED IN MEDICAL RECORD: ICD-10-PCS | Mod: CPTII,S$GLB,, | Performed by: FAMILY MEDICINE

## 2023-04-10 PROCEDURE — 69210 PR REMOVAL IMPACTED CERUMEN REQUIRING INSTRUMENTATION, UNILATERAL: ICD-10-PCS | Mod: RT,S$GLB,, | Performed by: FAMILY MEDICINE

## 2023-04-10 PROCEDURE — 99214 PR OFFICE/OUTPT VISIT, EST, LEVL IV, 30-39 MIN: ICD-10-PCS | Mod: 25,S$GLB,, | Performed by: FAMILY MEDICINE

## 2023-04-10 PROCEDURE — 3078F PR MOST RECENT DIASTOLIC BLOOD PRESSURE < 80 MM HG: ICD-10-PCS | Mod: CPTII,S$GLB,, | Performed by: FAMILY MEDICINE

## 2023-04-10 PROCEDURE — 3288F FALL RISK ASSESSMENT DOCD: CPT | Mod: CPTII,S$GLB,, | Performed by: FAMILY MEDICINE

## 2023-04-10 PROCEDURE — 1125F PR PAIN SEVERITY QUANTIFIED, PAIN PRESENT: ICD-10-PCS | Mod: CPTII,S$GLB,, | Performed by: FAMILY MEDICINE

## 2023-04-10 PROCEDURE — 1159F MED LIST DOCD IN RCRD: CPT | Mod: CPTII,S$GLB,, | Performed by: FAMILY MEDICINE

## 2023-04-10 PROCEDURE — 99999 PR PBB SHADOW E&M-EST. PATIENT-LVL V: CPT | Mod: PBBFAC,,, | Performed by: FAMILY MEDICINE

## 2023-04-10 RX ORDER — DIAZEPAM 5 MG/1
5 TABLET ORAL NIGHTLY PRN
Qty: 30 TABLET | Refills: 5 | Status: SHIPPED | OUTPATIENT
Start: 2023-04-10 | End: 2023-07-12

## 2023-04-10 NOTE — PROGRESS NOTES
Assessment:       1. Major depressive disorder, single episode, in partial remission    2. Coronary artery disease of bypass graft of native heart with stable angina pectoris    3. PAD (peripheral artery disease)    4. Other emphysema    5. Tobacco dependence    6. Essential hypertension    7. Anxiety    8. S/P lumbar spine operation    9. Chronic bilateral low back pain with bilateral sciatica    10. Chronic pain of both hips    11. Impacted cerumen of right ear    12. Pressure sensation in both ears    13. Abnormal glucose    14. Leg cramping    15. Numbness        Plan:       Major depressive disorder, single episode, in partial remission:  Stable  -     CBC Auto Differential; Future; Expected date: 04/10/2023  -     TSH; Future; Expected date: 04/10/2023    Coronary artery disease of bypass graft of native heart with stable angina pectoris:  Stable  -     CBC Auto Differential; Future; Expected date: 04/10/2023  -     Comprehensive Metabolic Panel; Future; Expected date: 04/10/2023  -     Lipid Panel; Future; Expected date: 04/10/2023    PAD (peripheral artery disease):  Stable    Other emphysema:  Stable    Tobacco dependence:  Stable  -     Ambulatory referral/consult to Smoking Cessation Program; Future; Expected date: 04/17/2023    Essential hypertension:  Stable    Anxiety:  Stable  -     diazePAM (VALIUM) 5 MG tablet; Take 1 tablet (5 mg total) by mouth nightly as needed for Insomnia.  Dispense: 30 tablet; Refill: 5  -     TSH; Future; Expected date: 04/10/2023    S/P lumbar spine operation:  Stable  -     diazePAM (VALIUM) 5 MG tablet; Take 1 tablet (5 mg total) by mouth nightly as needed for Insomnia.  Dispense: 30 tablet; Refill: 5  -     Ambulatory referral/consult to Physical/Occupational Therapy; Future; Expected date: 04/17/2023    Chronic bilateral low back pain with bilateral sciatica:  Worsening  -     Ambulatory referral/consult to Physical/Occupational Therapy; Future; Expected date:  04/17/2023    Chronic pain of both hips:  Worsening  -     Ambulatory referral/consult to Physical/Occupational Therapy; Future; Expected date: 04/17/2023    Impacted cerumen of right ear:  New problem workup needed  -     Ambulatory referral/consult to ENT; Future; Expected date: 04/17/2023    Pressure sensation in both ears:  New problem workup needed  -     Ambulatory referral/consult to ENT; Future; Expected date: 04/17/2023    Abnormal glucose:  New problem workup needed  -     Hemoglobin A1C; Future; Expected date: 04/10/2023    Leg cramping:  New problem workup needed  -     Magnesium, RBC; Future; Expected date: 04/10/2023    Numbness:  New problem workup needed  -     Vitamin B12; Future; Expected date: 04/10/2023  -     Folate; Future; Expected date: 04/10/2023         Will call the patient after we have the results of the test, healthy habits, avoid processed foods and processed starches, refer the patient to physical therapy secondary to worsening symptoms of back, the patient declined pain management appointment, refer the patient to the ENT secondary to pressure in both ears, upon verbal consent, the patient had walks removed from the right ear using the spatula.  The patient tolerated the procedure well, will give the appointment to see the ENT as patient has continued to have some pressure in both ears but improved on the right.  The patient's BMI has been recorded in the chart. The patient has been provided educational materials regarding the benefits of attaining and maintaining a normal weight. We will continue to address and follow this issue during follow up visits.  The patient was strongly advised to quit tobacco, he is in the action phase.   Patient agreed with assessment and plan. Patient verbalized understanding.     Subjective:       Patient ID: Alexis Medrano Jr. is a 72 y.o. male.    Chief Complaint: Leg Pain and Hip Pain    HPI    Back pain:  The patient complains of symptoms of back pain  that gone down to the lower extremities, the symptoms are getting worse.  The patient stated that he prefer not to go to the pain management specialist, the patient also stated that he would like to try therapy.  Pain also is located on the thigh areas bilaterally has numbness and tingling sensation sometimes associated with leg cramping.  The patient also complains of bilateral hip pain.  The patient stated that he has to use his came back again because of the pain.    Pressure in the ears:  The patient complains of pressure on bilateral ears, the right is worse than the left.  The symptoms are getting worse.  The patient would like a referral to see the ENT specialist, denies any fever, cough, congestion at this visit.  The patient still smoking cigarettes, he wants to quit, he would like to be referred to the smoking cessation Services.    Coronary artery disease:  The patient is supposed to be follow-up to see the cardiologist, the patient will make an appointment for this, denies any chest pain or worsening symptoms of shortness of breath, he also has peripheral vascular disease.    COPD:  Still smoking cigarettes, the patient has some wheezing, use the inhaler as needed.    Past medical history, past social history was reviewed and discussed with the patient.    Review of Systems   Constitutional:  Positive for activity change. Negative for appetite change.   HENT:  Positive for congestion, ear pain and hearing loss. Negative for ear discharge.    Eyes:  Negative for discharge and itching.   Respiratory:  Negative for choking and chest tightness.    Cardiovascular:  Negative for chest pain and palpitations.   Gastrointestinal:  Negative for abdominal distention, abdominal pain and constipation.   Endocrine: Negative for cold intolerance and heat intolerance.   Genitourinary:  Negative for dysuria and flank pain.   Musculoskeletal:  Positive for arthralgias and back pain.   Skin:  Negative for pallor and rash.    Allergic/Immunologic: Negative for environmental allergies and food allergies.   Neurological:  Negative for dizziness and facial asymmetry.   Hematological:  Negative for adenopathy. Does not bruise/bleed easily.   Psychiatric/Behavioral:  Positive for dysphoric mood. Negative for agitation, confusion and decreased concentration. The patient is nervous/anxious.      Objective:      Physical Exam  Vitals and nursing note reviewed.   Constitutional:       General: He is not in acute distress.     Appearance: Normal appearance. He is well-developed. He is obese. He is not diaphoretic.      Comments: The patient has limited ambulation   HENT:      Head: Normocephalic and atraumatic.      Right Ear: External ear normal.      Left Ear: External ear normal.      Nose: Nose normal.   Eyes:      General: No scleral icterus.        Left eye: No discharge.   Cardiovascular:      Rate and Rhythm: Normal rate and regular rhythm.      Heart sounds: Normal heart sounds.   Pulmonary:      Effort: Pulmonary effort is normal. No respiratory distress.      Breath sounds: Wheezing present.   Musculoskeletal:         General: Tenderness (Lower back and bilateral hips) present.      Cervical back: Normal range of motion and neck supple.   Skin:     General: Skin is warm and dry.      Coloration: Skin is not pale.      Findings: No erythema.   Neurological:      Mental Status: He is alert.      Motor: No abnormal muscle tone.   Psychiatric:         Behavior: Behavior normal.         Thought Content: Thought content normal.         Judgment: Judgment normal.

## 2023-04-11 ENCOUNTER — TELEPHONE (OUTPATIENT)
Dept: FAMILY MEDICINE | Facility: CLINIC | Age: 73
End: 2023-04-11

## 2023-04-11 DIAGNOSIS — F41.9 ANXIETY: ICD-10-CM

## 2023-04-11 DIAGNOSIS — Z98.890 S/P LUMBAR SPINE OPERATION: ICD-10-CM

## 2023-04-11 NOTE — TELEPHONE ENCOUNTER
----- Message from Denisa Arce sent at 4/11/2023  2:44 PM CDT -----  Contact: self  Type:  Needs Medical Advice    Who Called: self    Would the patient rather a call back or a response via [facility]? call  Best Call Back Number: [phone] or [phone] (work)    Additional Information: pt states [facility] wont approve his diazePAM (VALIUM) 5 MG tablet rx. Pt states would like to speak with nurse. Please advise and thank you.    [id number] - [address] [address]  Phone: [phone] Fax: [phone]

## 2023-04-17 ENCOUNTER — TELEPHONE (OUTPATIENT)
Dept: FAMILY MEDICINE | Facility: CLINIC | Age: 73
End: 2023-04-17
Payer: MEDICARE

## 2023-04-17 NOTE — TELEPHONE ENCOUNTER
Returned Patient's call. Left detailed message regarding medication sent to pharmacy, but is in process with a PA.

## 2023-04-17 NOTE — TELEPHONE ENCOUNTER
----- Message from Tawana Bledsoe sent at 4/17/2023 10:22 AM CDT -----  Contact: Pt @ [phone]  Type:  RX Refill Request    Who Called: Pt  Refill or New Rx:Refill  RX Name and Strength:diazePAM (VALIUM) 5 MG tablet  How is the patient currently taking it? (ex. 1XDay):1XDay  Is this a 30 day or 90 day RX:30 day  Preferred Pharmacy with phone number:    [facility][id number] - [place], [place] - [facility] [address] [facility]  [address]  [facility] [address]  Phone: [phone] Fax: [phone]  Phone: [phone] Fax: [phone]      Local or Mail Order:Local  Ordering Provider:Kalani Velázquez  Would the patient rather a call back or a response via [facility]? call  Best Call Back Number:[phone]  Additional Information: Pt is requesting a refill sent to his pharmacy. Please call pt's prescription in to his pharmacy.

## 2023-05-03 ENCOUNTER — TELEPHONE (OUTPATIENT)
Dept: OTOLARYNGOLOGY | Facility: CLINIC | Age: 73
End: 2023-05-03
Payer: MEDICARE

## 2023-05-03 NOTE — TELEPHONE ENCOUNTER
----- Message from Jovon Griffin sent at 5/3/2023  3:02 PM CDT -----  Type: Needs Medical Advice  Who Called:  Pt  Best Call Back Number: [phone]  Additional Information: Pt has an appt on 6/2 but would like to be seen sooner then that, please call bk to advise Thanks

## 2023-05-05 DIAGNOSIS — I10 ESSENTIAL HYPERTENSION: ICD-10-CM

## 2023-05-06 RX ORDER — ATENOLOL 100 MG/1
TABLET ORAL
Qty: 90 TABLET | Refills: 3 | Status: SHIPPED | OUTPATIENT
Start: 2023-05-06 | End: 2023-05-31 | Stop reason: SDUPTHER

## 2023-05-06 NOTE — TELEPHONE ENCOUNTER
Refill Decision Note   Alexis Medrano  is requesting a refill authorization.  Brief Assessment and Rationale for Refill:  Approve     Medication Therapy Plan:       Medication Reconciliation Completed: No   Comments:     No Care Gaps recommended.     Note composed:5:17 PM 05/06/2023

## 2023-05-06 NOTE — TELEPHONE ENCOUNTER
No care due was identified.  [facility] Embedded Care Due Messages. Reference number: [id number].   5/05/2023 7:36:28 PM CDT

## 2023-05-08 DIAGNOSIS — E78.5 DYSLIPIDEMIA: Primary | ICD-10-CM

## 2023-05-08 DIAGNOSIS — I25.708 CORONARY ARTERY DISEASE OF BYPASS GRAFT OF NATIVE HEART WITH STABLE ANGINA PECTORIS: ICD-10-CM

## 2023-05-08 RX ORDER — ROSUVASTATIN CALCIUM 40 MG/1
40 TABLET, COATED ORAL NIGHTLY
Qty: 90 TABLET | Refills: 1 | Status: SHIPPED | OUTPATIENT
Start: 2023-05-08 | End: 2023-05-30 | Stop reason: SDUPTHER

## 2023-05-14 DIAGNOSIS — I10 ESSENTIAL HYPERTENSION: ICD-10-CM

## 2023-05-14 NOTE — TELEPHONE ENCOUNTER
No care due was identified.  [facility] Embedded Care Due Messages. Reference number: [id number].   5/14/2023 1:15:53 PM CDT

## 2023-05-15 RX ORDER — TELMISARTAN AND HYDROCHLORTHIAZIDE 80; 12.5 MG/1; MG/1
TABLET ORAL
Qty: 90 TABLET | Refills: 1 | Status: SHIPPED | OUTPATIENT
Start: 2023-05-15 | End: 2023-05-31 | Stop reason: SDUPTHER

## 2023-05-15 NOTE — TELEPHONE ENCOUNTER
Refill Authorization Note     Refill Decision Note   Alexis Medrano  is requesting a refill authorization.  Brief Assessment and Rationale for Refill:  Approve     Medication Therapy Plan:       Medication Reconciliation Completed: No   Comments:     No Care Gaps recommended.     Note composed:10:18 AM 05/15/2023

## 2023-05-15 NOTE — TELEPHONE ENCOUNTER
Refill Routing Note   Medication(s) are not appropriate for processing by [facility] for the following reason(s):      Drug-drug interaction    [facility] action(s):  Defer Care Due:  None identified        Pharmacist review requested: Yes     Appointments  past 12m or future 3m with PCP    Date Provider   Last Visit   4/10/2023 Kalani Velázquez MD   Next Visit   10/10/2023 Kalani Velázquez MD   ED visits in past 90 days: 0        Note composed:9:15 AM 05/15/2023

## 2023-05-22 ENCOUNTER — TELEPHONE (OUTPATIENT)
Dept: FAMILY MEDICINE | Facility: CLINIC | Age: 73
End: 2023-05-22
Payer: MEDICARE

## 2023-05-22 NOTE — TELEPHONE ENCOUNTER
----- Message from Tawana Bledsoe sent at 5/22/2023  9:00 AM CDT -----  Contact: Pt @ [phone]  Type:  Needs Medical Advice    Who Called: Pt  Would the patient rather a call back or a response via [facility]? call  Best Call Back Number: [phone]  Additional Information: Pt states that his sister passed away, and he needs someone in the office to call him. He's not happy. Please call pt back to advise.

## 2023-05-22 NOTE — TELEPHONE ENCOUNTER
----- Message from Miki Aguilar MA sent at 2023  1:13 PM CDT -----    ----- Message -----  From: Janice Ortiz  Sent: 2023  10:01 AM CDT  To: Melania Uriarte Staff    Type: Needs Medical Advice  Who Called:  [facility]     Best Call Back Number: [phone].. if no one answer calls  [phone] (pt name and )   Additional Information: currently enrolled in pharmacy plan , wants to check to see if yall got the current rx list please advise thank corby marcus

## 2023-05-23 ENCOUNTER — TELEPHONE (OUTPATIENT)
Dept: FAMILY MEDICINE | Facility: CLINIC | Age: 73
End: 2023-05-23
Payer: MEDICARE

## 2023-05-23 NOTE — TELEPHONE ENCOUNTER
Contact patient to get him scheduled. Patient was incoherent. Patient admitted to consuming alcohol due to the loss of his sister. He was very upset. Patient was told that he will need to be seen as soon as possible. Patient denied scheduling an appointment.

## 2023-05-23 NOTE — TELEPHONE ENCOUNTER
----- Message from Miki Aguilar MA sent at 5/22/2023  3:29 PM CDT -----  Contact: Pt @ [phone]    ----- Message -----  From: Tawana Bledsoe  Sent: 5/22/2023   9:03 AM CDT  To: Melania Uriarte Staff    Type:  Needs Medical Advice    Who Called: Pt  Would the patient rather a call back or a response via [facility]? call  Best Call Back Number: [phone]  Additional Information: Pt states that his sister passed away, and he needs someone in the office to call him. He's not happy. Please call pt back to advise.

## 2023-05-30 DIAGNOSIS — E78.5 DYSLIPIDEMIA: ICD-10-CM

## 2023-05-30 DIAGNOSIS — I25.708 CORONARY ARTERY DISEASE OF BYPASS GRAFT OF NATIVE HEART WITH STABLE ANGINA PECTORIS: ICD-10-CM

## 2023-05-30 RX ORDER — ROSUVASTATIN CALCIUM 40 MG/1
40 TABLET, COATED ORAL NIGHTLY
Qty: 90 TABLET | Refills: 1 | Status: SHIPPED | OUTPATIENT
Start: 2023-05-30 | End: 2023-05-31 | Stop reason: SDUPTHER

## 2023-05-30 NOTE — TELEPHONE ENCOUNTER
----- Message from Jordy Medina sent at 5/30/2023  8:05 AM CDT -----  Regarding: advice  Contact: KAY MANZO JR. [[id number]]  Type: Needs Medical Advice  Who Called:  [facility]    Symptoms (please be specific):  na    How long has patient had these symptoms:  na    Pharmacy name and phone #:  na    Best Call Back Number: [phone]    Additional Information: Checking on fax request for: Rousuvastatin 40 mg. Please call to advise.

## 2023-05-31 DIAGNOSIS — E78.5 DYSLIPIDEMIA: ICD-10-CM

## 2023-05-31 DIAGNOSIS — I10 ESSENTIAL HYPERTENSION: ICD-10-CM

## 2023-05-31 DIAGNOSIS — E61.1 IRON DEFICIENCY: ICD-10-CM

## 2023-05-31 DIAGNOSIS — J44.9 CHRONIC OBSTRUCTIVE PULMONARY DISEASE, UNSPECIFIED COPD TYPE: ICD-10-CM

## 2023-05-31 DIAGNOSIS — I25.708 CORONARY ARTERY DISEASE OF BYPASS GRAFT OF NATIVE HEART WITH STABLE ANGINA PECTORIS: ICD-10-CM

## 2023-05-31 DIAGNOSIS — F41.9 ANXIETY: ICD-10-CM

## 2023-05-31 NOTE — TELEPHONE ENCOUNTER
No care due was identified.  [facility] Embedded Care Due Messages. Reference number: [id number].   5/31/2023 2:00:38 PM CDT

## 2023-05-31 NOTE — TELEPHONE ENCOUNTER
----- Message from Minoo Crockett sent at 5/31/2023  1:03 PM CDT -----  Regarding: RX request  Contact: [facility]  at [phone]  Type: RX request  Who Called:  [facility]  at [phone] fax#[phone]    Additional Information: Pharmacy is calling in refills for the following:    #atenoloL (TENORMIN) 100 MG tablet  #telmisartan-hydrochlorothiazide (MICARDIS HCT) 80-12.5 mg per tablet  #albuterol (PROVENTIL/VENTOLIN HFA) 90 mcg/actuation inhaler  #EScitalopram oxalate (LEXAPRO) 20 MG tablet  #FEROSUL 325 mg (65 mg iron) Tab tablet  #finasteride (PROSCAR) 5 mg tablet  #rosuvastatin (CRESTOR) 40 MG Tab

## 2023-06-01 RX ORDER — ALBUTEROL SULFATE 90 UG/1
AEROSOL, METERED RESPIRATORY (INHALATION)
Qty: 54 G | Refills: 3 | Status: SHIPPED | OUTPATIENT
Start: 2023-06-01 | End: 2023-06-02 | Stop reason: SDUPTHER

## 2023-06-01 RX ORDER — FERROUS SULFATE 325(65) MG
TABLET ORAL
Qty: 90 TABLET | Refills: 3 | Status: SHIPPED | OUTPATIENT
Start: 2023-06-01 | End: 2023-06-02 | Stop reason: SDUPTHER

## 2023-06-01 RX ORDER — ROSUVASTATIN CALCIUM 40 MG/1
40 TABLET, COATED ORAL NIGHTLY
Qty: 90 TABLET | Refills: 3 | Status: SHIPPED | OUTPATIENT
Start: 2023-06-01 | End: 2023-06-02 | Stop reason: SDUPTHER

## 2023-06-01 RX ORDER — TELMISARTAN AND HYDROCHLORTHIAZIDE 80; 12.5 MG/1; MG/1
1 TABLET ORAL DAILY
Qty: 90 TABLET | Refills: 3 | Status: SHIPPED | OUTPATIENT
Start: 2023-06-01 | End: 2023-06-02 | Stop reason: SDUPTHER

## 2023-06-01 RX ORDER — ESCITALOPRAM OXALATE 20 MG/1
20 TABLET ORAL NIGHTLY
Qty: 90 TABLET | Refills: 3 | Status: SHIPPED | OUTPATIENT
Start: 2023-06-01 | End: 2023-06-02 | Stop reason: SDUPTHER

## 2023-06-01 RX ORDER — ATENOLOL 100 MG/1
100 TABLET ORAL DAILY
Qty: 90 TABLET | Refills: 3 | Status: SHIPPED | OUTPATIENT
Start: 2023-06-01 | End: 2023-06-02 | Stop reason: SDUPTHER

## 2023-06-01 RX ORDER — FINASTERIDE 5 MG/1
5 TABLET, FILM COATED ORAL DAILY
Qty: 90 TABLET | Refills: 3 | Status: SHIPPED | OUTPATIENT
Start: 2023-06-01 | End: 2023-06-02 | Stop reason: SDUPTHER

## 2023-06-02 DIAGNOSIS — I25.708 CORONARY ARTERY DISEASE OF BYPASS GRAFT OF NATIVE HEART WITH STABLE ANGINA PECTORIS: ICD-10-CM

## 2023-06-02 DIAGNOSIS — J44.9 CHRONIC OBSTRUCTIVE PULMONARY DISEASE, UNSPECIFIED COPD TYPE: ICD-10-CM

## 2023-06-02 DIAGNOSIS — I10 ESSENTIAL HYPERTENSION: ICD-10-CM

## 2023-06-02 DIAGNOSIS — E61.1 IRON DEFICIENCY: ICD-10-CM

## 2023-06-02 DIAGNOSIS — E78.5 DYSLIPIDEMIA: ICD-10-CM

## 2023-06-02 DIAGNOSIS — F41.9 ANXIETY: ICD-10-CM

## 2023-06-02 RX ORDER — ROSUVASTATIN CALCIUM 40 MG/1
40 TABLET, COATED ORAL NIGHTLY
Qty: 90 TABLET | Refills: 3 | Status: CANCELLED | OUTPATIENT
Start: 2023-06-02

## 2023-06-02 RX ORDER — ALBUTEROL SULFATE 90 UG/1
AEROSOL, METERED RESPIRATORY (INHALATION)
Qty: 54 G | Refills: 3 | Status: SHIPPED | OUTPATIENT
Start: 2023-06-02 | End: 2023-06-12 | Stop reason: SDUPTHER

## 2023-06-02 RX ORDER — ROSUVASTATIN CALCIUM 40 MG/1
40 TABLET, COATED ORAL NIGHTLY
Qty: 90 TABLET | Refills: 3 | Status: SHIPPED | OUTPATIENT
Start: 2023-06-02 | End: 2023-06-12 | Stop reason: SDUPTHER

## 2023-06-02 RX ORDER — FERROUS SULFATE 325(65) MG
TABLET ORAL
Qty: 90 TABLET | Refills: 3 | Status: SHIPPED | OUTPATIENT
Start: 2023-06-02 | End: 2023-06-12 | Stop reason: SDUPTHER

## 2023-06-02 RX ORDER — FINASTERIDE 5 MG/1
5 TABLET, FILM COATED ORAL DAILY
Qty: 90 TABLET | Refills: 3 | Status: SHIPPED | OUTPATIENT
Start: 2023-06-02 | End: 2023-06-12 | Stop reason: SDUPTHER

## 2023-06-02 RX ORDER — TELMISARTAN AND HYDROCHLORTHIAZIDE 80; 12.5 MG/1; MG/1
1 TABLET ORAL DAILY
Qty: 90 TABLET | Refills: 3 | Status: SHIPPED | OUTPATIENT
Start: 2023-06-02 | End: 2023-06-12 | Stop reason: SDUPTHER

## 2023-06-02 RX ORDER — ESCITALOPRAM OXALATE 20 MG/1
20 TABLET ORAL NIGHTLY
Qty: 90 TABLET | Refills: 3 | Status: CANCELLED | OUTPATIENT
Start: 2023-06-02

## 2023-06-02 RX ORDER — ALBUTEROL SULFATE 90 UG/1
AEROSOL, METERED RESPIRATORY (INHALATION)
Qty: 54 G | Refills: 3 | Status: CANCELLED | OUTPATIENT
Start: 2023-06-02

## 2023-06-02 RX ORDER — ATENOLOL 100 MG/1
100 TABLET ORAL DAILY
Qty: 90 TABLET | Refills: 3 | Status: SHIPPED | OUTPATIENT
Start: 2023-06-02 | End: 2023-06-12 | Stop reason: SDUPTHER

## 2023-06-02 RX ORDER — TELMISARTAN AND HYDROCHLORTHIAZIDE 80; 12.5 MG/1; MG/1
1 TABLET ORAL DAILY
Qty: 90 TABLET | Refills: 3 | Status: CANCELLED | OUTPATIENT
Start: 2023-06-02

## 2023-06-02 RX ORDER — FINASTERIDE 5 MG/1
5 TABLET, FILM COATED ORAL DAILY
Qty: 90 TABLET | Refills: 3 | Status: CANCELLED | OUTPATIENT
Start: 2023-06-02

## 2023-06-02 RX ORDER — ATENOLOL 100 MG/1
100 TABLET ORAL DAILY
Qty: 90 TABLET | Refills: 3 | Status: CANCELLED | OUTPATIENT
Start: 2023-06-02

## 2023-06-02 RX ORDER — ESCITALOPRAM OXALATE 20 MG/1
20 TABLET ORAL NIGHTLY
Qty: 90 TABLET | Refills: 3 | Status: SHIPPED | OUTPATIENT
Start: 2023-06-02 | End: 2023-06-12 | Stop reason: SDUPTHER

## 2023-06-02 RX ORDER — FERROUS SULFATE 325(65) MG
TABLET ORAL
Qty: 90 TABLET | Refills: 3 | Status: CANCELLED | OUTPATIENT
Start: 2023-06-02

## 2023-06-02 NOTE — TELEPHONE ENCOUNTER
No care due was identified.  [facility] Embedded Care Due Messages. Reference number: [id number].   6/02/2023 8:55:06 AM CDT

## 2023-06-12 DIAGNOSIS — I10 ESSENTIAL HYPERTENSION: ICD-10-CM

## 2023-06-12 DIAGNOSIS — F41.9 ANXIETY: ICD-10-CM

## 2023-06-12 DIAGNOSIS — I25.708 CORONARY ARTERY DISEASE OF BYPASS GRAFT OF NATIVE HEART WITH STABLE ANGINA PECTORIS: ICD-10-CM

## 2023-06-12 DIAGNOSIS — E61.1 IRON DEFICIENCY: ICD-10-CM

## 2023-06-12 DIAGNOSIS — J44.9 CHRONIC OBSTRUCTIVE PULMONARY DISEASE, UNSPECIFIED COPD TYPE: ICD-10-CM

## 2023-06-12 DIAGNOSIS — E78.5 DYSLIPIDEMIA: ICD-10-CM

## 2023-06-12 RX ORDER — FINASTERIDE 5 MG/1
5 TABLET, FILM COATED ORAL DAILY
Qty: 90 TABLET | Refills: 3 | Status: SHIPPED | OUTPATIENT
Start: 2023-06-12 | End: 2023-06-27 | Stop reason: SDUPTHER

## 2023-06-12 RX ORDER — ALBUTEROL SULFATE 90 UG/1
AEROSOL, METERED RESPIRATORY (INHALATION)
Qty: 54 G | Refills: 3 | Status: SHIPPED | OUTPATIENT
Start: 2023-06-12 | End: 2023-06-27 | Stop reason: SDUPTHER

## 2023-06-12 RX ORDER — ESCITALOPRAM OXALATE 20 MG/1
20 TABLET ORAL NIGHTLY
Qty: 90 TABLET | Refills: 3 | Status: SHIPPED | OUTPATIENT
Start: 2023-06-12 | End: 2023-06-27 | Stop reason: SDUPTHER

## 2023-06-12 RX ORDER — ATENOLOL 100 MG/1
100 TABLET ORAL DAILY
Qty: 90 TABLET | Refills: 3 | Status: SHIPPED | OUTPATIENT
Start: 2023-06-12 | End: 2023-08-08 | Stop reason: SDUPTHER

## 2023-06-12 RX ORDER — ROSUVASTATIN CALCIUM 40 MG/1
40 TABLET, COATED ORAL NIGHTLY
Qty: 90 TABLET | Refills: 3 | Status: SHIPPED | OUTPATIENT
Start: 2023-06-12 | End: 2023-09-05

## 2023-06-12 RX ORDER — TELMISARTAN AND HYDROCHLORTHIAZIDE 80; 12.5 MG/1; MG/1
1 TABLET ORAL DAILY
Qty: 90 TABLET | Refills: 3 | Status: SHIPPED | OUTPATIENT
Start: 2023-06-12 | End: 2023-08-08 | Stop reason: SDUPTHER

## 2023-06-12 RX ORDER — FERROUS SULFATE 325(65) MG
TABLET ORAL
Qty: 90 TABLET | Refills: 3 | Status: SHIPPED | OUTPATIENT
Start: 2023-06-12 | End: 2023-06-27 | Stop reason: SDUPTHER

## 2023-06-12 NOTE — TELEPHONE ENCOUNTER
No care due was identified.  [facility] Embedded Care Due Messages. Reference number: [id number].   6/12/2023 11:37:16 AM CDT

## 2023-06-27 DIAGNOSIS — E61.1 IRON DEFICIENCY: ICD-10-CM

## 2023-06-27 DIAGNOSIS — F41.9 ANXIETY: ICD-10-CM

## 2023-06-27 DIAGNOSIS — J44.9 CHRONIC OBSTRUCTIVE PULMONARY DISEASE, UNSPECIFIED COPD TYPE: ICD-10-CM

## 2023-06-27 RX ORDER — ALBUTEROL SULFATE 90 UG/1
AEROSOL, METERED RESPIRATORY (INHALATION)
Qty: 54 G | Refills: 3 | Status: ON HOLD | OUTPATIENT
Start: 2023-06-27 | End: 2024-03-20 | Stop reason: HOSPADM

## 2023-06-27 RX ORDER — FERROUS SULFATE 325(65) MG
TABLET ORAL
Qty: 90 TABLET | Refills: 3 | Status: SHIPPED | OUTPATIENT
Start: 2023-06-27

## 2023-06-27 RX ORDER — FINASTERIDE 5 MG/1
5 TABLET, FILM COATED ORAL DAILY
Qty: 90 TABLET | Refills: 3 | Status: SHIPPED | OUTPATIENT
Start: 2023-06-27 | End: 2023-08-08 | Stop reason: SDUPTHER

## 2023-06-27 RX ORDER — ESCITALOPRAM OXALATE 20 MG/1
20 TABLET ORAL NIGHTLY
Qty: 90 TABLET | Refills: 3 | Status: SHIPPED | OUTPATIENT
Start: 2023-06-27

## 2023-06-27 NOTE — TELEPHONE ENCOUNTER
No care due was identified.  [facility] Embedded Care Due Messages. Reference number: [id number].   6/27/2023 5:32:06 PM CDT

## 2023-06-27 NOTE — TELEPHONE ENCOUNTER
----- Message from Zulay Garza sent at 6/27/2023  4:39 PM CDT -----  Contact: [facility] [phone]  Type:  RX Refill Request    Who Called:  Given w/ [facility]   Refill or New Rx:  refill   Is this a 30 day or 90 day RX:  90  Preferred Pharmacy with phone number:    [facility] [place] - [place], [place] - [address] [place] [address] [place] [address]  Phone: [phone] Fax: [phone]  Local or Mail Order:  Mail   Ordering Provider:  Melania Marie Call Back Number:  [phone]  Additional Information:  Updated [facility] info to correct pharmacy. Requesting refill on:     finasteride (PROSCAR) 5 mg tablet  albuterol (PROVENTIL/VENTOLIN HFA) 90 mcg/actuation inhaler  ferrous sulfate (FEROSUL) 325 mg (65 mg iron) Tab tablet  EScitalopram oxalate (LEXAPRO) 20 MG tablet

## 2023-06-28 NOTE — TELEPHONE ENCOUNTER
Refill Routing Note   Medication(s) are not appropriate for processing by [facility] for the following reason(s):      Medication outside of protocol    [facility] action(s):  Route  Approve None identified          Alert overridden per protocol: Yes     Appointments  past 12m or future 3m with PCP    Date Provider   Last Visit   4/10/2023 Kalani Velázquez MD   Next Visit   10/10/2023 Kalani Velázquez MD   ED visits in past 90 days: 0        Note composed:8:06 PM 06/27/2023

## 2023-06-29 DIAGNOSIS — R60.0 LOCALIZED EDEMA: ICD-10-CM

## 2023-06-29 RX ORDER — POTASSIUM CHLORIDE 20 MEQ/1
20 TABLET, EXTENDED RELEASE ORAL DAILY
Qty: 90 TABLET | Refills: 3 | Status: SHIPPED | OUTPATIENT
Start: 2023-06-29 | End: 2023-08-08 | Stop reason: SDUPTHER

## 2023-06-29 NOTE — TELEPHONE ENCOUNTER
No care due was identified.  [facility] Embedded Care Due Messages. Reference number: [id number].   6/29/2023 1:07:03 PM CDT

## 2023-06-29 NOTE — TELEPHONE ENCOUNTER
----- Message from Melba Smith sent at 6/29/2023 12:22 PM CDT -----  Regarding: Refill  Contact: Pt  Type:  RX Refill Request    Who Called:  Pt  Refill or New Rx:  Refill  RX Name and Strength:  potassium chloride SA (K-DUR,KLOR-CON) 20 MEQ tablet  atenoloL (TENORMIN) 100 MG tablet  How is the patient currently taking it? (ex. 1XDay):  1x  Is this a 30 day or 90 day RX:  90  Preferred Pharmacy with phone number:    [facility] [address]  Phone: [phone] Fax: [phone]    Local or Mail Order:  Local  Ordering Provider:  Dr. Melania Marie Call Back Number:  [phone]  Additional Information:  Pt states he needs refills on these medications. The Tenormin was sent on 06/12/23, but the transmission failed and pharmacy still says they have not received it. He wants these prescriptions sent to the  pharmacy listed above. Please call patient to advise when sent. Thanks!

## 2023-07-10 DIAGNOSIS — Z98.890 S/P LUMBAR SPINE OPERATION: ICD-10-CM

## 2023-07-10 DIAGNOSIS — F41.9 ANXIETY: ICD-10-CM

## 2023-07-10 DIAGNOSIS — I10 ESSENTIAL HYPERTENSION: ICD-10-CM

## 2023-07-10 RX ORDER — DIAZEPAM 5 MG/1
5 TABLET ORAL NIGHTLY PRN
Qty: 30 TABLET | Refills: 5 | OUTPATIENT
Start: 2023-07-10 | End: 2024-01-06

## 2023-07-10 NOTE — TELEPHONE ENCOUNTER
----- Message from Crystal Randall sent at 7/10/2023 10:44 AM CDT -----  Regarding: RX  Type:  RX Refill Request    Who Called: Pt    Refill or New Rx:Refill    RX Name and Strength:diazePAM (VALIUM) 5 MG tablet    How is the patient currently taking it? (ex. 1XDay): 1 daily     Is this a 30 day or 90 day RX:30    Preferred Pharmacy with phone number:      [facility] [place] [facility] [address] [place] [address]  Phone: [phone] Fax: [phone]    Local or Mail Order:Local      Would the patient rather a call back or a response via [facility]? Call back    Best Call Back Number:[phone] or [phone]    Additional Information: Sts the pharmacy needs clarification on his med. Please advise -------------------- Thank you

## 2023-07-10 NOTE — TELEPHONE ENCOUNTER
No care due was identified.  [facility] Embedded Care Due Messages. Reference number: [id number].   7/10/2023 1:27:32 PM CDT

## 2023-07-11 NOTE — TELEPHONE ENCOUNTER
----- Message from Teagan Minor sent at 7/11/2023  4:22 PM CDT -----  Contact: [facility]  Type:  RX Refill Request    Who Called:  Pt  Refill or New Rx: Refill (need new Rx)  RX Name and Strength: atenoloL (TENORMIN) 100 MG tablet  How is the patient currently taking it? (ex. 1XDay):as directed   Is this a 30 day or 90 day RX: 90  Preferred Pharmacy with phone number:   [facility] [place] - [address] [address]  Phone: [phone] Fax: [phone]    Local or Mail Order:Mail  Ordering Provider: Dr. Velázquez  Would the patient rather a call back or a response via [facility]?  call  Best Call Back Number: [phone]    Thank you...

## 2023-07-12 ENCOUNTER — TELEPHONE (OUTPATIENT)
Dept: FAMILY MEDICINE | Facility: CLINIC | Age: 73
End: 2023-07-12
Payer: MEDICARE

## 2023-07-12 DIAGNOSIS — Z98.890 S/P LUMBAR SPINE OPERATION: ICD-10-CM

## 2023-07-12 DIAGNOSIS — F41.9 ANXIETY: ICD-10-CM

## 2023-07-12 RX ORDER — DIAZEPAM 5 MG/1
5 TABLET ORAL NIGHTLY PRN
Qty: 30 TABLET | Refills: 2 | Status: SHIPPED | OUTPATIENT
Start: 2023-07-12 | End: 2023-09-06

## 2023-07-12 NOTE — TELEPHONE ENCOUNTER
----- Message from Deana Higuera sent at 7/12/2023  2:53 PM CDT -----  Contact: Pt  Type: Needs Medical Advice    Who Called:Pt  Best Call Back Number:[phone]    Additional Information Requesting a call back regarding Pt was calling to speak with office in regards to there Diazepam pt stated they were told by pharmacy they would need medication to be approved by  pt stated to please call Thank you  Please Advise-Thank you

## 2023-07-12 NOTE — TELEPHONE ENCOUNTER
Spoke to patient's pharmacy, the said that his prescription for the valium was cancelled by you. Patient is requesting a refill on the valium. Please advise.

## 2023-08-02 ENCOUNTER — TELEPHONE (OUTPATIENT)
Dept: FAMILY MEDICINE | Facility: CLINIC | Age: 73
End: 2023-08-02
Payer: MEDICARE

## 2023-08-02 NOTE — TELEPHONE ENCOUNTER
----- Message from Dari Weber sent at 8/2/2023  4:21 PM CDT -----  Contact: self  Type:  RX Refill Request    Who Called:  pt  Refill or New Rx:  refill  RX Name and Strength:  potassium chloride SA (K-DUR,KLOR-CON) 20 MEQ tablet      How is the patient currently taking it? (ex. 1XDay):  as directed  Is this a 30 day or 90 day RX:  90  Preferred Pharmacy with phone number:     [facility] [address]  Phone: [phone] Fax: [phone]    I   Local or Mail Order:  local  Ordering Provider:  dr joann Marie Call Back Number:  [phone]   Additional Information:  please call pt to verify he is out of another medication but cant see the label

## 2023-08-08 DIAGNOSIS — J44.9 CHRONIC OBSTRUCTIVE PULMONARY DISEASE, UNSPECIFIED COPD TYPE: ICD-10-CM

## 2023-08-08 DIAGNOSIS — I10 ESSENTIAL HYPERTENSION: ICD-10-CM

## 2023-08-08 DIAGNOSIS — R60.0 LOCALIZED EDEMA: ICD-10-CM

## 2023-08-08 RX ORDER — FINASTERIDE 5 MG/1
5 TABLET, FILM COATED ORAL DAILY
Qty: 90 TABLET | Refills: 3 | Status: SHIPPED | OUTPATIENT
Start: 2023-08-08

## 2023-08-08 RX ORDER — TELMISARTAN AND HYDROCHLORTHIAZIDE 80; 12.5 MG/1; MG/1
1 TABLET ORAL DAILY
Qty: 90 TABLET | Refills: 3 | Status: SHIPPED | OUTPATIENT
Start: 2023-08-08 | End: 2023-10-10 | Stop reason: SDUPTHER

## 2023-08-08 RX ORDER — POTASSIUM CHLORIDE 20 MEQ/1
20 TABLET, EXTENDED RELEASE ORAL DAILY
Qty: 90 TABLET | Refills: 3 | Status: ON HOLD | OUTPATIENT
Start: 2023-08-08 | End: 2024-03-20 | Stop reason: HOSPADM

## 2023-08-08 RX ORDER — MONTELUKAST SODIUM 10 MG/1
10 TABLET ORAL NIGHTLY
Qty: 90 TABLET | Refills: 3 | Status: SHIPPED | OUTPATIENT
Start: 2023-08-08

## 2023-08-08 RX ORDER — ATENOLOL 100 MG/1
100 TABLET ORAL DAILY
Qty: 90 TABLET | Refills: 3 | Status: ON HOLD | OUTPATIENT
Start: 2023-08-08 | End: 2024-03-20 | Stop reason: HOSPADM

## 2023-08-08 NOTE — TELEPHONE ENCOUNTER
No care due was identified.  [facility] Embedded Care Due Messages. Reference number: [id number].   8/08/2023 11:35:48 AM CDT

## 2023-08-08 NOTE — TELEPHONE ENCOUNTER
----- Message from James Castaneda sent at 8/8/2023 11:19 AM CDT -----  Contact: pt  Type: Needs Medical Advice  Who Called:  pt  Best Call Back Number: [phone]    Additional Information: Pt is calling the office needs to speak with someone in the office.Please call back and advise.

## 2023-08-11 ENCOUNTER — TELEPHONE (OUTPATIENT)
Dept: FAMILY MEDICINE | Facility: CLINIC | Age: 73
End: 2023-08-11
Payer: MEDICARE

## 2023-08-11 NOTE — TELEPHONE ENCOUNTER
----- Message from Josiah Hanna sent at 8/11/2023 10:21 AM CDT -----  Contact: pt at [phone]  Type: Needs Medical Advice  Who Called:  pt  Best Call Back Number: [phone]  Additional Information: pt is calling the office to see why his potassium script has not been called in to the pharmacy.    PER THE PT PLEASE CALL BACK THIS MORNING ASAP

## 2023-09-01 DIAGNOSIS — Z98.890 S/P LUMBAR SPINE OPERATION: ICD-10-CM

## 2023-09-01 DIAGNOSIS — F41.9 ANXIETY: ICD-10-CM

## 2023-09-01 NOTE — TELEPHONE ENCOUNTER
No care due was identified.  [facility] Embedded Care Due Messages. Reference number: [id number].   9/01/2023 12:26:01 PM CDT

## 2023-09-05 DIAGNOSIS — I25.708 CORONARY ARTERY DISEASE OF BYPASS GRAFT OF NATIVE HEART WITH STABLE ANGINA PECTORIS: ICD-10-CM

## 2023-09-05 DIAGNOSIS — E78.5 DYSLIPIDEMIA: ICD-10-CM

## 2023-09-05 RX ORDER — ROSUVASTATIN CALCIUM 40 MG/1
40 TABLET, COATED ORAL NIGHTLY
Qty: 90 TABLET | Refills: 0 | Status: SHIPPED | OUTPATIENT
Start: 2023-09-05 | End: 2023-11-04

## 2023-09-05 NOTE — TELEPHONE ENCOUNTER
Refill Decision Note   Alexis Medrano  is requesting a refill authorization.  Brief Assessment and Rationale for Refill:  Approve     Medication Therapy Plan:       Medication Reconciliation Completed: No    Comments:     No Care Gaps recommended.     Note composed:1:52 PM 09/05/2023

## 2023-09-05 NOTE — TELEPHONE ENCOUNTER
No care due was identified.  [facility] Embedded Care Due Messages. Reference number: [id number].   9/05/2023 9:25:36 AM CDT

## 2023-09-06 DIAGNOSIS — N52.9 ERECTILE DYSFUNCTION, UNSPECIFIED ERECTILE DYSFUNCTION TYPE: ICD-10-CM

## 2023-09-06 RX ORDER — DIAZEPAM 5 MG/1
5 TABLET ORAL NIGHTLY
Qty: 30 TABLET | Refills: 0 | Status: SHIPPED | OUTPATIENT
Start: 2023-09-06 | End: 2023-10-10 | Stop reason: SDUPTHER

## 2023-09-06 NOTE — TELEPHONE ENCOUNTER
----- Message from Walter Jaime sent at 2023 10:49 AM CDT -----  Contact: self  Type:  RX Refill Request    Who Called:  pt  Refill or New Rx:  refill  RX Name and Strength:  sildenafiL (VIAGRA) 100 MG tablet ()  How is the patient currently taking it? (ex. 1XDay):  Take 1 tablet (100 mg total) by mouth daily as needed for Erectile Dysfunction. - Oral  Is this a 30 day or 90 day RX:    Preferred Pharmacy with phone number:    [facility] [address] [address]  Phone: [phone] Fax: [phone]      Local or Mail Order:  local  Ordering Provider:  n/a  Best Call Back Number:  [phone]   Additional Information:  Thank you

## 2023-09-06 NOTE — TELEPHONE ENCOUNTER
No care due was identified.  [facility] Embedded Care Due Messages. Reference number: [id number].   9/06/2023 5:23:08 PM CDT

## 2023-09-07 RX ORDER — SILDENAFIL 100 MG/1
100 TABLET, FILM COATED ORAL DAILY PRN
Qty: 20 TABLET | Refills: 2 | Status: SHIPPED | OUTPATIENT
Start: 2023-09-07 | End: 2023-09-11 | Stop reason: SDUPTHER

## 2023-09-11 DIAGNOSIS — N52.9 ERECTILE DYSFUNCTION, UNSPECIFIED ERECTILE DYSFUNCTION TYPE: ICD-10-CM

## 2023-09-11 RX ORDER — SILDENAFIL 100 MG/1
100 TABLET, FILM COATED ORAL DAILY PRN
Qty: 20 TABLET | Refills: 2 | Status: ON HOLD | OUTPATIENT
Start: 2023-09-11 | End: 2024-03-20 | Stop reason: HOSPADM

## 2023-09-11 NOTE — TELEPHONE ENCOUNTER
----- Message from Telma Landers, Patient Care Assistant sent at 9/11/2023  9:47 AM CDT -----  Contact: self  Pt is calling to have his med sildenafiL (VIAGRA) 100 MG tablet to   [facility] [address] [address]  Phone: [phone] Fax: [phone]

## 2023-09-11 NOTE — TELEPHONE ENCOUNTER
No care due was identified.  [facility] Embedded Care Due Messages. Reference number: [id number].   9/11/2023 1:08:29 PM CDT

## 2023-09-14 DIAGNOSIS — R60.0 LOCALIZED EDEMA: ICD-10-CM

## 2023-09-14 RX ORDER — FUROSEMIDE 20 MG/1
20 TABLET ORAL DAILY
Qty: 90 TABLET | Refills: 3 | Status: SHIPPED | OUTPATIENT
Start: 2023-09-14

## 2023-09-14 NOTE — TELEPHONE ENCOUNTER
----- Message from Daniela Hutchison sent at 9/14/2023  9:18 AM CDT -----  Contact: [facility]  Type:  RX Refill Request    Who Called:  [facility]  Refill or New Rx:  new rx  RX Name and Strength:  furosemide (LASIX) 20 MG tablet  How is the patient currently taking it? (ex. 1XDay):  as directed   Is this a 30 day or 90 day RX:  90  Preferred Pharmacy with phone number:    [facility] [place] - [address] [address]  Phone: [phone] Fax: [phone]  Local or Mail Order:  mail order   Ordering Provider:  Dr Melania Marie Call Back Number:  [phone]  Additional Information:  Thanks

## 2023-09-14 NOTE — TELEPHONE ENCOUNTER
No care due was identified.  [facility] Embedded Care Due Messages. Reference number: [id number].   9/14/2023 11:52:59 AM CDT

## 2023-10-10 ENCOUNTER — OFFICE VISIT (OUTPATIENT)
Dept: FAMILY MEDICINE | Facility: CLINIC | Age: 73
End: 2023-10-10
Payer: MEDICARE

## 2023-10-10 ENCOUNTER — LAB VISIT (OUTPATIENT)
Dept: LAB | Facility: HOSPITAL | Age: 73
End: 2023-10-10
Attending: FAMILY MEDICINE
Payer: MEDICARE

## 2023-10-10 VITALS
HEART RATE: 70 BPM | BODY MASS INDEX: 31.3 KG/M2 | SYSTOLIC BLOOD PRESSURE: 128 MMHG | WEIGHT: 183.31 LBS | OXYGEN SATURATION: 97 % | DIASTOLIC BLOOD PRESSURE: 78 MMHG | HEIGHT: 64 IN

## 2023-10-10 DIAGNOSIS — E78.49 OTHER HYPERLIPIDEMIA: ICD-10-CM

## 2023-10-10 DIAGNOSIS — I25.708 CORONARY ARTERY DISEASE OF BYPASS GRAFT OF NATIVE HEART WITH STABLE ANGINA PECTORIS: ICD-10-CM

## 2023-10-10 DIAGNOSIS — I10 ESSENTIAL HYPERTENSION: ICD-10-CM

## 2023-10-10 DIAGNOSIS — I25.708 CORONARY ARTERY DISEASE OF BYPASS GRAFT OF NATIVE HEART WITH STABLE ANGINA PECTORIS: Primary | ICD-10-CM

## 2023-10-10 DIAGNOSIS — R73.09 ABNORMAL GLUCOSE: ICD-10-CM

## 2023-10-10 DIAGNOSIS — R25.2 LEG CRAMPING: ICD-10-CM

## 2023-10-10 DIAGNOSIS — J20.8 ACUTE BACTERIAL BRONCHITIS: ICD-10-CM

## 2023-10-10 DIAGNOSIS — F17.200 TOBACCO DEPENDENCE: ICD-10-CM

## 2023-10-10 DIAGNOSIS — Z98.890 S/P LUMBAR SPINE OPERATION: ICD-10-CM

## 2023-10-10 DIAGNOSIS — F41.9 ANXIETY: ICD-10-CM

## 2023-10-10 DIAGNOSIS — B96.89 ACUTE BACTERIAL BRONCHITIS: ICD-10-CM

## 2023-10-10 DIAGNOSIS — F32.4 MAJOR DEPRESSIVE DISORDER, SINGLE EPISODE, IN PARTIAL REMISSION: ICD-10-CM

## 2023-10-10 PROCEDURE — 3288F FALL RISK ASSESSMENT DOCD: CPT | Mod: CPTII,S$GLB,, | Performed by: FAMILY MEDICINE

## 2023-10-10 PROCEDURE — 3078F PR MOST RECENT DIASTOLIC BLOOD PRESSURE < 80 MM HG: ICD-10-PCS | Mod: CPTII,S$GLB,, | Performed by: FAMILY MEDICINE

## 2023-10-10 PROCEDURE — 3008F BODY MASS INDEX DOCD: CPT | Mod: CPTII,S$GLB,, | Performed by: FAMILY MEDICINE

## 2023-10-10 PROCEDURE — G0008 ADMIN INFLUENZA VIRUS VAC: HCPCS | Mod: S$GLB,,, | Performed by: FAMILY MEDICINE

## 2023-10-10 PROCEDURE — 1159F PR MEDICATION LIST DOCUMENTED IN MEDICAL RECORD: ICD-10-PCS | Mod: CPTII,S$GLB,, | Performed by: FAMILY MEDICINE

## 2023-10-10 PROCEDURE — 90694 VACC AIIV4 NO PRSRV 0.5ML IM: CPT | Mod: S$GLB,,, | Performed by: FAMILY MEDICINE

## 2023-10-10 PROCEDURE — 1101F PT FALLS ASSESS-DOCD LE1/YR: CPT | Mod: CPTII,S$GLB,, | Performed by: FAMILY MEDICINE

## 2023-10-10 PROCEDURE — 83735 ASSAY OF MAGNESIUM: CPT | Performed by: FAMILY MEDICINE

## 2023-10-10 PROCEDURE — 99999 PR PBB SHADOW E&M-EST. PATIENT-LVL V: ICD-10-PCS | Mod: PBBFAC,,, | Performed by: FAMILY MEDICINE

## 2023-10-10 PROCEDURE — 1125F AMNT PAIN NOTED PAIN PRSNT: CPT | Mod: CPTII,S$GLB,, | Performed by: FAMILY MEDICINE

## 2023-10-10 PROCEDURE — 3078F DIAST BP <80 MM HG: CPT | Mod: CPTII,S$GLB,, | Performed by: FAMILY MEDICINE

## 2023-10-10 PROCEDURE — 99214 OFFICE O/P EST MOD 30 MIN: CPT | Mod: 25,S$GLB,, | Performed by: FAMILY MEDICINE

## 2023-10-10 PROCEDURE — 85025 COMPLETE CBC W/AUTO DIFF WBC: CPT | Performed by: FAMILY MEDICINE

## 2023-10-10 PROCEDURE — G0008 FLU VACCINE - QUADRIVALENT - ADJUVANTED: ICD-10-PCS | Mod: S$GLB,,, | Performed by: FAMILY MEDICINE

## 2023-10-10 PROCEDURE — 3008F PR BODY MASS INDEX (BMI) DOCUMENTED: ICD-10-PCS | Mod: CPTII,S$GLB,, | Performed by: FAMILY MEDICINE

## 2023-10-10 PROCEDURE — 3074F PR MOST RECENT SYSTOLIC BLOOD PRESSURE < 130 MM HG: ICD-10-PCS | Mod: CPTII,S$GLB,, | Performed by: FAMILY MEDICINE

## 2023-10-10 PROCEDURE — 1160F RVW MEDS BY RX/DR IN RCRD: CPT | Mod: CPTII,S$GLB,, | Performed by: FAMILY MEDICINE

## 2023-10-10 PROCEDURE — 99214 PR OFFICE/OUTPT VISIT, EST, LEVL IV, 30-39 MIN: ICD-10-PCS | Mod: 25,S$GLB,, | Performed by: FAMILY MEDICINE

## 2023-10-10 PROCEDURE — 3074F SYST BP LT 130 MM HG: CPT | Mod: CPTII,S$GLB,, | Performed by: FAMILY MEDICINE

## 2023-10-10 PROCEDURE — 1159F MED LIST DOCD IN RCRD: CPT | Mod: CPTII,S$GLB,, | Performed by: FAMILY MEDICINE

## 2023-10-10 PROCEDURE — 1101F PR PT FALLS ASSESS DOC 0-1 FALLS W/OUT INJ PAST YR: ICD-10-PCS | Mod: CPTII,S$GLB,, | Performed by: FAMILY MEDICINE

## 2023-10-10 PROCEDURE — 90694 FLU VACCINE - QUADRIVALENT - ADJUVANTED: ICD-10-PCS | Mod: S$GLB,,, | Performed by: FAMILY MEDICINE

## 2023-10-10 PROCEDURE — 36415 COLL VENOUS BLD VENIPUNCTURE: CPT | Mod: PO | Performed by: FAMILY MEDICINE

## 2023-10-10 PROCEDURE — 3288F PR FALLS RISK ASSESSMENT DOCUMENTED: ICD-10-PCS | Mod: CPTII,S$GLB,, | Performed by: FAMILY MEDICINE

## 2023-10-10 PROCEDURE — 80061 LIPID PANEL: CPT | Performed by: FAMILY MEDICINE

## 2023-10-10 PROCEDURE — 1125F PR PAIN SEVERITY QUANTIFIED, PAIN PRESENT: ICD-10-PCS | Mod: CPTII,S$GLB,, | Performed by: FAMILY MEDICINE

## 2023-10-10 PROCEDURE — 1160F PR REVIEW ALL MEDS BY PRESCRIBER/CLIN PHARMACIST DOCUMENTED: ICD-10-PCS | Mod: CPTII,S$GLB,, | Performed by: FAMILY MEDICINE

## 2023-10-10 PROCEDURE — 80053 COMPREHEN METABOLIC PANEL: CPT | Performed by: FAMILY MEDICINE

## 2023-10-10 PROCEDURE — 83036 HEMOGLOBIN GLYCOSYLATED A1C: CPT | Performed by: FAMILY MEDICINE

## 2023-10-10 PROCEDURE — 84443 ASSAY THYROID STIM HORMONE: CPT | Performed by: FAMILY MEDICINE

## 2023-10-10 PROCEDURE — 99999 PR PBB SHADOW E&M-EST. PATIENT-LVL V: CPT | Mod: PBBFAC,,, | Performed by: FAMILY MEDICINE

## 2023-10-10 RX ORDER — TELMISARTAN AND HYDROCHLORTHIAZIDE 80; 12.5 MG/1; MG/1
1 TABLET ORAL DAILY
Qty: 90 TABLET | Refills: 3 | Status: SHIPPED | OUTPATIENT
Start: 2023-10-10 | End: 2023-11-04

## 2023-10-10 RX ORDER — DIAZEPAM 5 MG/1
5 TABLET ORAL NIGHTLY
Qty: 30 TABLET | Refills: 5 | Status: ON HOLD | OUTPATIENT
Start: 2023-10-10 | End: 2024-02-28

## 2023-10-10 RX ORDER — PREDNISONE 20 MG/1
20 TABLET ORAL DAILY
Qty: 7 TABLET | Refills: 0 | Status: ON HOLD | OUTPATIENT
Start: 2023-10-10 | End: 2024-03-20 | Stop reason: HOSPADM

## 2023-10-10 RX ORDER — CEFDINIR 300 MG/1
300 CAPSULE ORAL 2 TIMES DAILY
Qty: 20 CAPSULE | Refills: 0 | Status: SHIPPED | OUTPATIENT
Start: 2023-10-10 | End: 2023-10-20

## 2023-10-10 NOTE — PROGRESS NOTES
Assessment:       1. Coronary artery disease of bypass graft of native heart with stable angina pectoris    2. Anxiety    3. S/P lumbar spine operation    4. Tobacco dependence    5. Other hyperlipidemia    6. Essential hypertension    7. Acute bacterial bronchitis            Plan:       Coronary artery disease of bypass graft of native heart with stable angina pectoris  -     Ambulatory referral/consult to Cardiology; Future; Expected date: 10/17/2023    Anxiety:  Stable  -     diazePAM (VALIUM) 5 MG tablet; Take 1 tablet (5 mg total) by mouth every evening.  Dispense: 30 tablet; Refill: 5    S/P lumbar spine operation:  Stable  -     diazePAM (VALIUM) 5 MG tablet; Take 1 tablet (5 mg total) by mouth every evening.  Dispense: 30 tablet; Refill: 5    Tobacco dependence:  Worsening    Other hyperlipidemia:  Uncontrolled    Essential hypertension:  Stable  -     telmisartan-hydrochlorothiazide (MICARDIS HCT) 80-12.5 mg per tablet; Take 1 tablet by mouth once daily.  Dispense: 90 tablet; Refill: 3    Acute bacterial bronchitis:  Worsening  -     cefdinir (OMNICEF) 300 MG capsule; Take 1 capsule (300 mg total) by mouth 2 (two) times daily. for 10 days  Dispense: 20 capsule; Refill: 0  -     predniSONE (DELTASONE) 20 MG tablet; Take 1 tablet (20 mg total) by mouth once daily.  Dispense: 7 tablet; Refill: 0      Other orders  -     Influenza - Quadrivalent (Adjuvanted)         Recommend to start taking antibiotics and prednisone, if the symptoms get worse, the patient notify us immediately, use the inhaler as needed.  [place] prescription monitoring program was checked and okay, diazepam was prescribed for the patient.  Will refer the patient to the cardiologist, the patient declined appointment today.  Strongly ER warning signs given to the patient.  The patient declined to have an EKG today.  The patient was strongly advised to quit tobacco, he is in the  contemplation phase.  The patient's BMI has been recorded in  the chart. The patient has been provided educational materials regarding the benefits of attaining and maintaining a normal weight. We will continue to address and follow this issue during follow up visits.   Patient agreed with assessment and plan. Patient verbalized understanding.     Subjective:       Patient ID: Alexis Medrano Jr. is a 73 y.o. male.    Chief Complaint: medication check    HPI    The patient here today complaining of symptoms of nasal congestion, postnasal drip, pressure in the sinuses, headaches, cough, sputum, the symptoms started more than a week ago and are getting worse.  The patient also complains of chest tightness and episode of chest pains, the patient stated that has not been seen the cardiologist for a while and he will need a new referral.  The patient has been taking his cholesterol medication as directed by the HDL levels were low.  He has been taking his blood pressure medicines as directed, he still smoking cigarettes, he does not want to quit at this time but stated when he is ready he would like to go to the smoking cessation Services.  He has anxiety and muscle spasms and he would like a prescription for diazepam that he takes as needed.    Past medical history, past social history was reviewed and discussed with the patient.    Review of Systems   Constitutional:  Positive for activity change. Negative for appetite change.   HENT:  Positive for congestion and sinus pressure. Negative for ear discharge.    Eyes:  Negative for discharge and itching.   Respiratory:  Positive for chest tightness. Negative for choking.    Cardiovascular:  Positive for chest pain. Negative for palpitations and leg swelling.   Gastrointestinal:  Negative for abdominal distention and abdominal pain.   Endocrine: Negative for cold intolerance and heat intolerance.   Genitourinary:  Negative for dysuria and flank pain.   Musculoskeletal:  Positive for arthralgias and back pain.   Skin:  Negative for pallor  and rash.   Allergic/Immunologic: Negative for environmental allergies and food allergies.   Neurological:  Positive for headaches. Negative for dizziness and facial asymmetry.   Hematological:  Negative for adenopathy.   Psychiatric/Behavioral:  Positive for sleep disturbance. Negative for agitation and confusion. The patient is nervous/anxious.        Objective:      Physical Exam  Vitals and nursing note reviewed.   Constitutional:       General: He is not in acute distress.     Appearance: Normal appearance. He is well-developed. He is obese. He is not diaphoretic.      Comments: The patient has limited ambulation   HENT:      Head: Normocephalic and atraumatic.      Right Ear: External ear normal.      Left Ear: External ear normal.      Nose: Nose normal.   Eyes:      General: No scleral icterus.        Left eye: No discharge.   Neck:      Trachea: No tracheal deviation.   Cardiovascular:      Rate and Rhythm: Normal rate and regular rhythm.      Heart sounds: Normal heart sounds.   Pulmonary:      Effort: Pulmonary effort is normal. No respiratory distress.      Breath sounds: Decreased breath sounds and wheezing (On bilateral lung fields) present.   Musculoskeletal:         General: No tenderness.      Cervical back: Normal range of motion and neck supple.   Skin:     General: Skin is warm and dry.      Coloration: Skin is not pale.      Findings: No erythema.   Neurological:      Mental Status: He is alert.      Motor: No abnormal muscle tone.   Psychiatric:         Behavior: Behavior normal.         Thought Content: Thought content normal.         Judgment: Judgment normal.

## 2023-10-11 ENCOUNTER — TELEPHONE (OUTPATIENT)
Dept: FAMILY MEDICINE | Facility: CLINIC | Age: 73
End: 2023-10-11
Payer: MEDICARE

## 2023-10-11 LAB
ALBUMIN SERPL BCP-MCNC: 4 G/DL (ref 3.5–5.2)
ALP SERPL-CCNC: 58 U/L (ref 55–135)
ALT SERPL W/O P-5'-P-CCNC: 15 U/L (ref 10–44)
ANION GAP SERPL CALC-SCNC: 14 MMOL/L (ref 8–16)
AST SERPL-CCNC: 20 U/L (ref 10–40)
BASOPHILS # BLD AUTO: 0.08 K/UL (ref 0–0.2)
BASOPHILS NFR BLD: 1 % (ref 0–1.9)
BILIRUB SERPL-MCNC: 0.3 MG/DL (ref 0.1–1)
BUN SERPL-MCNC: 20 MG/DL (ref 8–23)
CALCIUM SERPL-MCNC: 10.1 MG/DL (ref 8.7–10.5)
CHLORIDE SERPL-SCNC: 104 MMOL/L (ref 95–110)
CHOLEST SERPL-MCNC: 146 MG/DL (ref 120–199)
CHOLEST/HDLC SERPL: 3.6 {RATIO} (ref 2–5)
CO2 SERPL-SCNC: 23 MMOL/L (ref 23–29)
CREAT SERPL-MCNC: 1.3 MG/DL (ref 0.5–1.4)
DIFFERENTIAL METHOD: ABNORMAL
EOSINOPHIL # BLD AUTO: 0.1 K/UL (ref 0–0.5)
EOSINOPHIL NFR BLD: 1.8 % (ref 0–8)
ERYTHROCYTE [DISTWIDTH] IN BLOOD BY AUTOMATED COUNT: 15.8 % (ref 11.5–14.5)
EST. GFR  (NO RACE VARIABLE): 57.6 ML/MIN/1.73 M^2
ESTIMATED AVG GLUCOSE: 82 MG/DL (ref 68–131)
GLUCOSE SERPL-MCNC: 86 MG/DL (ref 70–110)
HBA1C MFR BLD: 4.5 % (ref 4–5.6)
HCT VFR BLD AUTO: 47.5 % (ref 40–54)
HDLC SERPL-MCNC: 41 MG/DL (ref 40–75)
HDLC SERPL: 28.1 % (ref 20–50)
HGB BLD-MCNC: 14.7 G/DL (ref 14–18)
IMM GRANULOCYTES # BLD AUTO: 0.02 K/UL (ref 0–0.04)
IMM GRANULOCYTES NFR BLD AUTO: 0.3 % (ref 0–0.5)
LDLC SERPL CALC-MCNC: 91 MG/DL (ref 63–159)
LYMPHOCYTES # BLD AUTO: 1.4 K/UL (ref 1–4.8)
LYMPHOCYTES NFR BLD: 18.3 % (ref 18–48)
MCH RBC QN AUTO: 27.5 PG (ref 27–31)
MCHC RBC AUTO-ENTMCNC: 30.9 G/DL (ref 32–36)
MCV RBC AUTO: 89 FL (ref 82–98)
MONOCYTES # BLD AUTO: 0.5 K/UL (ref 0.3–1)
MONOCYTES NFR BLD: 5.8 % (ref 4–15)
NEUTROPHILS # BLD AUTO: 5.6 K/UL (ref 1.8–7.7)
NEUTROPHILS NFR BLD: 72.8 % (ref 38–73)
NONHDLC SERPL-MCNC: 105 MG/DL
NRBC BLD-RTO: 0 /100 WBC
PLATELET # BLD AUTO: 165 K/UL (ref 150–450)
PMV BLD AUTO: 13 FL (ref 9.2–12.9)
POTASSIUM SERPL-SCNC: 3.6 MMOL/L (ref 3.5–5.1)
PROT SERPL-MCNC: 7.7 G/DL (ref 6–8.4)
RBC # BLD AUTO: 5.35 M/UL (ref 4.6–6.2)
SODIUM SERPL-SCNC: 141 MMOL/L (ref 136–145)
TRIGL SERPL-MCNC: 70 MG/DL (ref 30–150)
TSH SERPL DL<=0.005 MIU/L-ACNC: 0.86 UIU/ML (ref 0.4–4)
WBC # BLD AUTO: 7.71 K/UL (ref 3.9–12.7)

## 2023-10-11 NOTE — TELEPHONE ENCOUNTER
"Can you please let the patient know:  "The results of the kidney function is slightly up from previous, please advise the patient to maintain hydration to trying to drink more water.  Hemoglobin is in good range.  There is no diabetes cholesterol levels are good, make sure to follow-up with the cardiologist in 2 days, tell the patient to avoid any anti-inflammatories over-the-counter including Aleve, Motrin, ibuprofen that can cause problems with the kidneys.  Will recheck at his next office visit.  Thank you."  "

## 2023-10-13 ENCOUNTER — OFFICE VISIT (OUTPATIENT)
Dept: CARDIOLOGY | Facility: CLINIC | Age: 73
End: 2023-10-13
Payer: MEDICARE

## 2023-10-13 VITALS
HEART RATE: 57 BPM | SYSTOLIC BLOOD PRESSURE: 121 MMHG | BODY MASS INDEX: 31.81 KG/M2 | HEIGHT: 64 IN | WEIGHT: 186.31 LBS | DIASTOLIC BLOOD PRESSURE: 71 MMHG

## 2023-10-13 DIAGNOSIS — Z77.090 ASBESTOS EXPOSURE: ICD-10-CM

## 2023-10-13 DIAGNOSIS — F17.210 CIGARETTE SMOKER: ICD-10-CM

## 2023-10-13 DIAGNOSIS — E78.5 DYSLIPIDEMIA: ICD-10-CM

## 2023-10-13 DIAGNOSIS — I25.10 CORONARY ARTERY DISEASE INVOLVING NATIVE CORONARY ARTERY OF NATIVE HEART WITHOUT ANGINA PECTORIS: ICD-10-CM

## 2023-10-13 DIAGNOSIS — Z95.1 HISTORY OF CORONARY ARTERY BYPASS GRAFT: ICD-10-CM

## 2023-10-13 DIAGNOSIS — R93.89 ABNORMAL CT OF THE CHEST: ICD-10-CM

## 2023-10-13 DIAGNOSIS — I45.10 RBBB: ICD-10-CM

## 2023-10-13 DIAGNOSIS — I73.9 PAD (PERIPHERAL ARTERY DISEASE): ICD-10-CM

## 2023-10-13 DIAGNOSIS — R09.89 BRUIT OF RIGHT CAROTID ARTERY: ICD-10-CM

## 2023-10-13 DIAGNOSIS — R01.1 UNDIAGNOSED CARDIAC MURMURS: ICD-10-CM

## 2023-10-13 DIAGNOSIS — J43.1 PANLOBULAR EMPHYSEMA: Primary | ICD-10-CM

## 2023-10-13 DIAGNOSIS — I10 ESSENTIAL HYPERTENSION: ICD-10-CM

## 2023-10-13 DIAGNOSIS — I25.708 CORONARY ARTERY DISEASE OF BYPASS GRAFT OF NATIVE HEART WITH STABLE ANGINA PECTORIS: ICD-10-CM

## 2023-10-13 PROCEDURE — 99204 OFFICE O/P NEW MOD 45 MIN: CPT | Mod: S$GLB,,, | Performed by: INTERNAL MEDICINE

## 2023-10-13 PROCEDURE — 1160F PR REVIEW ALL MEDS BY PRESCRIBER/CLIN PHARMACIST DOCUMENTED: ICD-10-PCS | Mod: CPTII,S$GLB,, | Performed by: INTERNAL MEDICINE

## 2023-10-13 PROCEDURE — 3008F PR BODY MASS INDEX (BMI) DOCUMENTED: ICD-10-PCS | Mod: CPTII,S$GLB,, | Performed by: INTERNAL MEDICINE

## 2023-10-13 PROCEDURE — 1126F AMNT PAIN NOTED NONE PRSNT: CPT | Mod: CPTII,S$GLB,, | Performed by: INTERNAL MEDICINE

## 2023-10-13 PROCEDURE — 3078F DIAST BP <80 MM HG: CPT | Mod: CPTII,S$GLB,, | Performed by: INTERNAL MEDICINE

## 2023-10-13 PROCEDURE — 3008F BODY MASS INDEX DOCD: CPT | Mod: CPTII,S$GLB,, | Performed by: INTERNAL MEDICINE

## 2023-10-13 PROCEDURE — 3074F PR MOST RECENT SYSTOLIC BLOOD PRESSURE < 130 MM HG: ICD-10-PCS | Mod: CPTII,S$GLB,, | Performed by: INTERNAL MEDICINE

## 2023-10-13 PROCEDURE — 3044F HG A1C LEVEL LT 7.0%: CPT | Mod: CPTII,S$GLB,, | Performed by: INTERNAL MEDICINE

## 2023-10-13 PROCEDURE — 3074F SYST BP LT 130 MM HG: CPT | Mod: CPTII,S$GLB,, | Performed by: INTERNAL MEDICINE

## 2023-10-13 PROCEDURE — 1101F PR PT FALLS ASSESS DOC 0-1 FALLS W/OUT INJ PAST YR: ICD-10-PCS | Mod: CPTII,S$GLB,, | Performed by: INTERNAL MEDICINE

## 2023-10-13 PROCEDURE — 3288F FALL RISK ASSESSMENT DOCD: CPT | Mod: CPTII,S$GLB,, | Performed by: INTERNAL MEDICINE

## 2023-10-13 PROCEDURE — 99999 PR PBB SHADOW E&M-EST. PATIENT-LVL V: ICD-10-PCS | Mod: PBBFAC,,, | Performed by: INTERNAL MEDICINE

## 2023-10-13 PROCEDURE — 1159F PR MEDICATION LIST DOCUMENTED IN MEDICAL RECORD: ICD-10-PCS | Mod: CPTII,S$GLB,, | Performed by: INTERNAL MEDICINE

## 2023-10-13 PROCEDURE — 1126F PR PAIN SEVERITY QUANTIFIED, NO PAIN PRESENT: ICD-10-PCS | Mod: CPTII,S$GLB,, | Performed by: INTERNAL MEDICINE

## 2023-10-13 PROCEDURE — 93010 ELECTROCARDIOGRAM REPORT: CPT | Mod: S$GLB,,, | Performed by: INTERNAL MEDICINE

## 2023-10-13 PROCEDURE — 3044F PR MOST RECENT HEMOGLOBIN A1C LEVEL <7.0%: ICD-10-PCS | Mod: CPTII,S$GLB,, | Performed by: INTERNAL MEDICINE

## 2023-10-13 PROCEDURE — 93005 ELECTROCARDIOGRAM TRACING: CPT | Mod: PO

## 2023-10-13 PROCEDURE — 1101F PT FALLS ASSESS-DOCD LE1/YR: CPT | Mod: CPTII,S$GLB,, | Performed by: INTERNAL MEDICINE

## 2023-10-13 PROCEDURE — 93010 EKG 12-LEAD: ICD-10-PCS | Mod: S$GLB,,, | Performed by: INTERNAL MEDICINE

## 2023-10-13 PROCEDURE — 3288F PR FALLS RISK ASSESSMENT DOCUMENTED: ICD-10-PCS | Mod: CPTII,S$GLB,, | Performed by: INTERNAL MEDICINE

## 2023-10-13 PROCEDURE — 3078F PR MOST RECENT DIASTOLIC BLOOD PRESSURE < 80 MM HG: ICD-10-PCS | Mod: CPTII,S$GLB,, | Performed by: INTERNAL MEDICINE

## 2023-10-13 PROCEDURE — 1160F RVW MEDS BY RX/DR IN RCRD: CPT | Mod: CPTII,S$GLB,, | Performed by: INTERNAL MEDICINE

## 2023-10-13 PROCEDURE — 99999 PR PBB SHADOW E&M-EST. PATIENT-LVL V: CPT | Mod: PBBFAC,,, | Performed by: INTERNAL MEDICINE

## 2023-10-13 PROCEDURE — 99204 PR OFFICE/OUTPT VISIT, NEW, LEVL IV, 45-59 MIN: ICD-10-PCS | Mod: S$GLB,,, | Performed by: INTERNAL MEDICINE

## 2023-10-13 PROCEDURE — 1159F MED LIST DOCD IN RCRD: CPT | Mod: CPTII,S$GLB,, | Performed by: INTERNAL MEDICINE

## 2023-10-13 NOTE — PROGRESS NOTES
Subjective:    Patient ID:  Alexis Medrano Jr. is a 73 y.o. male patient here for evaluation Hypertension and Carotid Artery Disease      History of Present Illness:  New patient cardiac evaluation.  History of coronary artery disease.  Status post CABG 2007.  Risk factors include ongoing tobacco use, hypertension dyslipidemia.  No recent noninvasive cardiac assessment.  Pre CABG angina.  Currently patient has stable JOSÉ, denies angina.  No PND orthopnea.  No edema.    Imaging review reveals unremarkable carotid ultrasound 2022, unremarkable lower extremity peripheral arterial ultrasound 2019.    Denies diabetes mellitus.  No history of CVA Ca.  No DVT PE.             Review of patient's allergies indicates:  No Known Allergies    Past Medical History:   Diagnosis Date    Depression     Hyperlipidemia 5/8/2019    Hypertension     Seizures      Past Surgical History:   Procedure Laterality Date    COLONOSCOPY N/A 4/28/2022    Procedure: COLONOSCOPY;  Surgeon: Jordy Zamora MD;  Location: [facility];  Service: Endoscopy;  Laterality: N/A;    CORONARY ARTERY BYPASS GRAFT      LAMINECTOMY OF THORACIC SPINE BY POSTERIOR APPROACH USING COMPUTER-ASSISTED NAVIGATION  5/29/2020    Procedure: LAMINECTOMY, SPINE, THORACIC, POSTERIOR APPROACH, USING COMPUTER-ASSISTED NAVIGATION T7-9;  Surgeon: Elmer Connell MD;  Location: [facility] OR;  Service: Neurosurgery;;    SURGICAL REMOVAL OF VERTEBRAL BODY OF THORACIC SPINE N/A 5/29/2020    Procedure: CORPECTOMY, SPINE, THORACIC - Partial T8 - T9 TRANSPEDICULAR/ COSTOTRANSVESECTOMY  T9;  Surgeon: Elmer Connell MD;  Location: [facility] OR;  Service: Neurosurgery;  Laterality: N/A;    THORACIC LAMINECTOMY WITH FUSION N/A 5/29/2020    Procedure: LAMINECTOMY, SPINE, THORACIC WITH FUSION- T7-10;  Surgeon: Elmer Connell MD;  Location: [facility] OR;  Service: Neurosurgery;  Laterality: N/A;     Social History     Tobacco Use    Smoking status: Every Day     Current packs/day: 1.00     Average packs/day:  1 pack/day for 57.0 years (57.0 ttl pk-yrs)     Types: Cigarettes    Smokeless tobacco: Never    Tobacco comments:     Age Started: 14   Substance Use Topics    Alcohol use: Yes     Comment: ocassional         Review of Systems:    As noted in HPI in addition         REVIEW OF SYSTEMS  Review of Systems   Constitutional: Negative for decreased appetite, diaphoresis, night sweats, weight gain and weight loss.   HENT:  Negative for nosebleeds and odynophagia.    Eyes:  Negative for double vision and photophobia.   Cardiovascular:  Positive for dyspnea on exertion. Negative for chest pain, claudication, cyanosis, irregular heartbeat, leg swelling, near-syncope, orthopnea, palpitations, paroxysmal nocturnal dyspnea and syncope.   Respiratory:  Positive for shortness of breath. Negative for cough, hemoptysis and wheezing.    Hematologic/Lymphatic: Negative for adenopathy.   Skin:  Negative for flushing, skin cancer and suspicious lesions.   Musculoskeletal:  Negative for gout, myalgias and neck pain.   Gastrointestinal:  Negative for abdominal pain, heartburn, hematemesis and hematochezia.   Genitourinary:  Negative for bladder incontinence, hesitancy and nocturia.   Neurological:  Negative for focal weakness, headaches, light-headedness and paresthesias.   Psychiatric/Behavioral:  Negative for memory loss and substance abuse.        Objective:        Vitals:    10/13/23 0831   BP: 121/71   Pulse: (!) 57       Lab Results   Component Value Date    WBC 7.71 10/10/2023    HGB 14.7 10/10/2023    HCT 47.5 10/10/2023     10/10/2023    CHOL 146 10/10/2023    TRIG 70 10/10/2023    HDL 41 10/10/2023    ALT 15 10/10/2023    AST 20 10/10/2023     10/10/2023    K 3.6 10/10/2023     10/10/2023    CREATININE 1.3 10/10/2023    BUN 20 10/10/2023    CO2 23 10/10/2023    TSH 0.863 10/10/2023    PSA 0.37 11/13/2019    INR 1.1 05/28/2020    HGBA1C 4.5 10/10/2023      CARDIOGRAM RESULTS  No results found for this or any  previous visit.        CURRENT/PREVIOUS VISIT EKG  Results for orders placed or performed during the hospital encounter of 05/28/20   EKG 12-lead    Collection Time: 05/28/20  9:31 PM    Narrative    Test Reason : R53.1,    Vent. Rate : 094 BPM     Atrial Rate : 094 BPM     P-R Int : 168 ms          QRS Dur : 140 ms      QT Int : 402 ms       P-R-T Axes : 062 -21 034 degrees     QTc Int : 502 ms    Normal sinus rhythm  Right bundle branch block  Abnormal ECG  When compared with ECG of 06-AUG-2007 14:10,  Premature ectopic complexes are no longer Present  Right bundle branch block is now Present  Confirmed by Anabell WRIGHT, Milo ([id number]) on 5/29/2020 7:56:23 AM    Referred By: [email or web address]   SELF           Confirmed By:Milo Hung MD     No valid procedures specified.   No results found for this or any previous visit.    No valid procedures specified.    PHYSICAL EXAM  CONSTITUTIONAL: Well built, well nourished in no apparent distress  NECK: no carotid bruit, no JVD  LUNGS: CTA, decreased breath sounds bilaterally  CHEST WALL: no tenderness,  HEART: regular rate and rhythm, S1, S2 normal, no murmur, click, rub or gallop   ABDOMEN: soft, non-tender; bowel sounds normal; no masses,  no organomegaly  EXTREMITIES: Extremities normal, no edema, no calf tenderness noted  VASCULAR EXAM: 2 PLUS UPPER AND diminished LOWER EXT PULSES  NEURO: AAO X 3, NO ACUTE FOCAL OR LATERALIZING FINDINGS    I HAVE REVIEWED :    The vital signs, nurses notes, and all the pertinent radiology and labs.         Current Outpatient Medications   Medication Instructions    acetaminophen (TYLENOL) 650 mg, Oral, Every 4 hours PRN    albuterol (PROVENTIL/VENTOLIN HFA) 90 mcg/actuation inhaler INHALE 2 PUFFS BY MOUTH EVERY 6 HOURS AS NEEDED FOR WHEEZING OR RESCUE    albuterol-ipratropium (DUO-NEB) 2.5 mg-0.5 mg/3 mL nebulizer solution 3 mLs, Nebulization, Every 6 hours PRN, Rescue    aspirin (ECOTRIN) 81 mg, Oral, Daily    atenoloL (TENORMIN) 100 mg, Oral,  Daily    azelastine (ASTELIN) 137 mcg, Nasal, 2 times daily    cefdinir (OMNICEF) 300 mg, Oral, 2 times daily    diazePAM (VALIUM) 5 mg, Oral, Nightly    EScitalopram oxalate (LEXAPRO) 20 mg, Oral, Nightly    ferrous sulfate (FEROSUL) 325 mg (65 mg iron) Tab tablet TAKE 1 TABLET BY MOUTH ONCE DAILY    finasteride (PROSCAR) 5 mg, Oral, Daily    fluticasone propionate (FLONASE) 50 mcg/actuation nasal spray SHAKE LIQUID AND USE 1 SPRAY(50 MCG) IN EACH NOSTRIL EVERY DAY    furosemide (LASIX) 20 mg, Oral, Daily    latanoprost 0.005 % ophthalmic solution 1 drop, Both Eyes, Nightly    linaCLOtide (LINZESS) 145 mcg, Oral, Before breakfast    meloxicam (MOBIC) 15 MG tablet TAKE 1 TABLET(15 MG) BY MOUTH DAILY AS NEEDED FOR PAIN OR BACK PAIN    montelukast (SINGULAIR) 10 mg, Oral, Nightly    multivitamin (THERAGRAN) per tablet 1 tablet, Oral, Daily    nicotine (NICODERM CQ) 21 mg/24 hr 1 patch, Transdermal, Daily    nicotine polacrilex 4 MG Lozg Take up to 6 pieces daily as needed    potassium chloride SA (K-DUR,KLOR-CON) 20 MEQ tablet 20 mEq, Oral, Daily    predniSONE (DELTASONE) 20 mg, Oral, Daily    rosuvastatin (CRESTOR) 40 mg, Oral, Nightly    sildenafiL (VIAGRA) 100 mg, Oral, Daily PRN    telmisartan-hydrochlorothiazide (MICARDIS HCT) 80-12.5 mg per tablet 1 tablet, Oral, Daily          Assessment:   ASCVD.  CAD.  CABG 2007.  Baseline EKG with normal sinus rhythm.  Right bundle-branch block.  Risk factors include ongoing tobacco use, hypertension dyslipidemia family history    Ongoing tobacco use.  COPD    Plan:   Cardiac exam review systems stable.  Stable JOSÉ.  Denies angina.  No recent noninvasive cardiac assessment.  Suggest echo Lexiscan.    Recent lab, including lipids stable.      No follow-ups on file.

## 2023-10-18 DIAGNOSIS — I10 ESSENTIAL HYPERTENSION: Primary | ICD-10-CM

## 2023-10-20 LAB — MAGNESIUM RBC-MCNC: 3.6 MG/DL (ref 4–6.4)

## 2023-10-23 ENCOUNTER — TELEPHONE (OUTPATIENT)
Dept: FAMILY MEDICINE | Facility: CLINIC | Age: 73
End: 2023-10-23
Payer: MEDICARE

## 2023-10-23 NOTE — TELEPHONE ENCOUNTER
"Magnesium levels are low.  I will recommend to take magnesium citrate 400 mg at bedtime to improve the levels, the medication usually helps also for symptoms of constipation.  Also I sent a message to him regarding his other results: "The results of the kidney function is slightly up from previous, please advise the patient to maintain hydration to trying to drink more water.  Hemoglobin is in good range.  There is no diabetes cholesterol levels are good, make sure to follow-up with the cardiologist in 2 days, tell the patient to avoid any anti-inflammatories over-the-counter including Aleve, Motrin, ibuprofen that can cause problems with the kidneys.  Will recheck at his next office visit.  Thank you."  "

## 2023-10-24 ENCOUNTER — TELEPHONE (OUTPATIENT)
Dept: FAMILY MEDICINE | Facility: CLINIC | Age: 73
End: 2023-10-24
Payer: MEDICARE

## 2023-10-24 NOTE — TELEPHONE ENCOUNTER
Spoke to pt. He said he is taking Aleve for his back pain and since he had to stop taking it because of his lab work his back has been hurting more. He would like to know what else can he take?

## 2023-10-24 NOTE — TELEPHONE ENCOUNTER
----- Message from Jhoana Kwong sent at 10/24/2023  2:08 PM CDT -----  Regarding: Returning call  Type:  Patient Returning Call    Who Called:  Alexis  Who Left Message for Patient:  Vernell  Does the patient know what this is regarding?: test results  Best Call Back Number:  [phone]    Additional Information:

## 2023-10-24 NOTE — TELEPHONE ENCOUNTER
----- Message from Keenan Obrien sent at 10/24/2023  1:09 PM CDT -----  Type:  Patient Returning Call    Who Called:  Patient  Who Left Message for Patient:  Vernell  Does the patient know what this is regarding?:  Lab results  Best Call Back Number:  [phone]  or [phone]  Please call after 1:30  Additional Information:

## 2023-10-31 ENCOUNTER — TELEPHONE (OUTPATIENT)
Dept: FAMILY MEDICINE | Facility: CLINIC | Age: 73
End: 2023-10-31
Payer: MEDICARE

## 2023-10-31 NOTE — TELEPHONE ENCOUNTER
Spoke to pt. He will go to pharmacy to get the covid shot.     He is also having back pain and leg pain since the weather is changing and would like to know what can he take? LOV 10/10/23

## 2023-10-31 NOTE — TELEPHONE ENCOUNTER
----- Message from Jhoana Kwong sent at 10/31/2023 11:18 AM CDT -----  Regarding: Needs return call  Type: Needs Medical Advice  Who Called:  Alexis Marie Call Back Number: [phone]    Additional Information: Pt was tryign to get his covid vaccine please call to cheo,

## 2023-11-03 ENCOUNTER — TELEPHONE (OUTPATIENT)
Dept: CARDIOLOGY | Facility: CLINIC | Age: 73
End: 2023-11-03
Payer: MEDICARE

## 2023-11-03 NOTE — TELEPHONE ENCOUNTER
----- Message from Denisa Arce sent at 11/3/2023 11:24 AM CDT -----  Contact: self  Type:  Needs Medical Advice    Who Called: self  Symptoms (please be specific): pt has some questions about his stress test and would like the nurse to give him a call   Would the patient rather a call back or a response via [facility]? call  Best Call Back Number:   Additional Information: please advise and thank you.

## 2023-11-04 DIAGNOSIS — I25.708 CORONARY ARTERY DISEASE OF BYPASS GRAFT OF NATIVE HEART WITH STABLE ANGINA PECTORIS: ICD-10-CM

## 2023-11-04 DIAGNOSIS — E78.5 DYSLIPIDEMIA: ICD-10-CM

## 2023-11-04 DIAGNOSIS — I10 ESSENTIAL HYPERTENSION: ICD-10-CM

## 2023-11-04 RX ORDER — TELMISARTAN AND HYDROCHLORTHIAZIDE 80; 12.5 MG/1; MG/1
TABLET ORAL
Qty: 90 TABLET | Refills: 3 | Status: ON HOLD | OUTPATIENT
Start: 2023-11-04 | End: 2024-03-20 | Stop reason: HOSPADM

## 2023-11-04 RX ORDER — ROSUVASTATIN CALCIUM 40 MG/1
TABLET, COATED ORAL
Qty: 90 TABLET | Refills: 3 | Status: SHIPPED | OUTPATIENT
Start: 2023-11-04

## 2023-11-04 NOTE — TELEPHONE ENCOUNTER
No care due was identified.  [facility] Embedded Care Due Messages. Reference number: [id number].   11/04/2023 7:04:37 AM CDT

## 2023-11-04 NOTE — TELEPHONE ENCOUNTER
Refill Decision Note   Alexis Medrano  is requesting a refill authorization.  Brief Assessment and Rationale for Refill:  Approve     Medication Therapy Plan:       Medication Reconciliation Completed: No   Comments:     No Care Gaps recommended.     Note composed:5:37 PM 11/04/2023

## 2023-11-10 ENCOUNTER — HOSPITAL ENCOUNTER (OUTPATIENT)
Dept: RADIOLOGY | Facility: HOSPITAL | Age: 73
Discharge: HOME | End: 2023-11-10
Attending: INTERNAL MEDICINE
Payer: MEDICARE

## 2023-11-10 ENCOUNTER — CLINICAL SUPPORT (OUTPATIENT)
Dept: CARDIOLOGY | Facility: HOSPITAL | Age: 73
End: 2023-11-10
Attending: INTERNAL MEDICINE
Payer: MEDICARE

## 2023-11-10 DIAGNOSIS — R01.1 UNDIAGNOSED CARDIAC MURMURS: ICD-10-CM

## 2023-11-10 DIAGNOSIS — I25.708 CORONARY ARTERY DISEASE OF BYPASS GRAFT OF NATIVE HEART WITH STABLE ANGINA PECTORIS: ICD-10-CM

## 2023-11-10 DIAGNOSIS — I25.10 CORONARY ARTERY DISEASE INVOLVING NATIVE CORONARY ARTERY OF NATIVE HEART WITHOUT ANGINA PECTORIS: ICD-10-CM

## 2023-11-10 DIAGNOSIS — R93.89 ABNORMAL CT OF THE CHEST: ICD-10-CM

## 2023-11-10 DIAGNOSIS — E78.5 DYSLIPIDEMIA: ICD-10-CM

## 2023-11-16 ENCOUNTER — TELEPHONE (OUTPATIENT)
Dept: FAMILY MEDICINE | Facility: CLINIC | Age: 73
End: 2023-11-16
Payer: MEDICARE

## 2023-11-16 NOTE — TELEPHONE ENCOUNTER
----- Message from Jordy Medina sent at 11/16/2023 11:39 AM CST -----  Regarding: ret call  Contact: KAY MANZO JR. [[id number]]  Type:  Patient Returning Call    Who Called:  Kay    Who Left Message for Patient:  Clotilde    Does the patient know what this is regarding?:  NO    Best Call Back Number:  [phone]    Additional Information:  Please call to advise.

## 2023-11-16 NOTE — TELEPHONE ENCOUNTER
Called Patient to ask if he has received most recent refills that were requested. No answer, left detailed voicemail with clinic callback.

## 2023-11-16 NOTE — TELEPHONE ENCOUNTER
----- Message from Anabel Dos Santos sent at 11/15/2023  4:52 PM CST -----  Type: Needs Medical Advice  Who Called:  sondra     Best Call Back Number: [phone]  Additional Information: checking on status of pt refill request please advise

## 2023-11-29 ENCOUNTER — TELEPHONE (OUTPATIENT)
Dept: FAMILY MEDICINE | Facility: CLINIC | Age: 73
End: 2023-11-29
Payer: MEDICARE

## 2023-11-29 NOTE — TELEPHONE ENCOUNTER
----- Message from Skye Hardy sent at 11/29/2023  8:51 AM CST -----  Contact: patient  Type:  Needs Medical Advice    Who Called: patient     Would the patient rather a call back or a response via [facility]? Call     Best Call Back Number: [phone] (home) [phone] (work)     Additional Information: Patient would like to speak with the nurse in regards to getting a medication prescribed.     Please call to advise

## 2023-11-29 NOTE — TELEPHONE ENCOUNTER
Tried to call patient, states caller is unreachable on [phone] and states unavailable on [phone]

## 2023-11-30 DIAGNOSIS — F41.9 ANXIETY: ICD-10-CM

## 2023-11-30 DIAGNOSIS — Z98.890 S/P LUMBAR SPINE OPERATION: ICD-10-CM

## 2023-11-30 RX ORDER — DIAZEPAM 5 MG/1
TABLET ORAL
Qty: 30 TABLET | Refills: 4 | OUTPATIENT
Start: 2023-12-09

## 2023-11-30 NOTE — TELEPHONE ENCOUNTER
No care due was identified.  [facility] Embedded Care Due Messages. Reference number: [id number].   11/30/2023 10:44:59 AM CST

## 2023-12-02 NOTE — TELEPHONE ENCOUNTER
Pt should still have refills at pharmacy in [place].     I tried to call pt to see, pt phone states unavailable.

## 2023-12-04 ENCOUNTER — TELEPHONE (OUTPATIENT)
Dept: CARDIOLOGY | Facility: CLINIC | Age: 73
End: 2023-12-04
Payer: MEDICARE

## 2023-12-04 NOTE — TELEPHONE ENCOUNTER
----- Message from Minoo Crockett sent at 12/4/2023  8:52 AM CST -----  Regarding: Appointment Reschedule Requests  Contact: patient at [phone]  Type: Appointment Reschedule Requests    Name of Caller:  patient at [phone]    Additional Information:   Patient has had a death in the family and would like to reschedule appointments on 12/6. Please call and advise. Thank you

## 2023-12-26 ENCOUNTER — HOSPITAL ENCOUNTER (OUTPATIENT)
Dept: RADIOLOGY | Facility: HOSPITAL | Age: 73
Discharge: HOME | End: 2023-12-26
Attending: INTERNAL MEDICINE
Payer: MEDICARE

## 2023-12-26 ENCOUNTER — CLINICAL SUPPORT (OUTPATIENT)
Dept: CARDIOLOGY | Facility: HOSPITAL | Age: 73
End: 2023-12-26
Attending: INTERNAL MEDICINE
Payer: MEDICARE

## 2023-12-26 VITALS
HEIGHT: 64 IN | HEIGHT: 64 IN | WEIGHT: 186 LBS | HEART RATE: 50 BPM | BODY MASS INDEX: 31.76 KG/M2 | BODY MASS INDEX: 31.76 KG/M2 | WEIGHT: 186 LBS

## 2023-12-26 PROCEDURE — 63600175 PHARM REV CODE 636 W HCPCS: Mod: PO | Performed by: INTERNAL MEDICINE

## 2023-12-26 PROCEDURE — 93306 ECHO (CUPID ONLY): ICD-10-PCS | Mod: 26,,, | Performed by: INTERNAL MEDICINE

## 2023-12-26 PROCEDURE — 93016 CV STRESS TEST SUPVJ ONLY: CPT | Mod: ,,, | Performed by: INTERNAL MEDICINE

## 2023-12-26 PROCEDURE — 78452 HT MUSCLE IMAGE SPECT MULT: CPT | Mod: 26,,, | Performed by: INTERNAL MEDICINE

## 2023-12-26 PROCEDURE — 93017 CV STRESS TEST TRACING ONLY: CPT | Mod: PO

## 2023-12-26 PROCEDURE — 93306 TTE W/DOPPLER COMPLETE: CPT | Mod: 26,,, | Performed by: INTERNAL MEDICINE

## 2023-12-26 PROCEDURE — A9502 TC99M TETROFOSMIN: HCPCS | Mod: PO

## 2023-12-26 PROCEDURE — 93306 TTE W/DOPPLER COMPLETE: CPT | Mod: PO

## 2023-12-26 PROCEDURE — 93018 CV STRESS TEST I&R ONLY: CPT | Mod: ,,, | Performed by: INTERNAL MEDICINE

## 2023-12-26 RX ORDER — REGADENOSON 0.08 MG/ML
0.4 INJECTION, SOLUTION INTRAVENOUS
Status: COMPLETED | OUTPATIENT
Start: 2023-12-26 | End: 2023-12-26

## 2023-12-26 RX ADMIN — REGADENOSON 0.4 MG: 0.08 INJECTION, SOLUTION INTRAVENOUS at 09:12

## 2023-12-27 LAB
ASCENDING AORTA: 2.53 CM
AV INDEX (PROSTH): 0.82
AV MEAN GRADIENT: 4 MMHG
AV PEAK GRADIENT: 8 MMHG
AV VALVE AREA BY VELOCITY RATIO: 2.44 CM²
AV VALVE AREA: 2.68 CM²
AV VELOCITY RATIO: 0.75
BSA FOR ECHO PROCEDURE: 1.95 M2
CV ECHO LV RWT: 0.48 CM
CV PHARM DOSE: 0.4 MG
CV STRESS BASE HR: 52 BPM
DIASTOLIC BLOOD PRESSURE: 98 MMHG
DOP CALC AO PEAK VEL: 1.39 M/S
DOP CALC AO VTI: 30.2 CM
DOP CALC LVOT AREA: 3.3 CM2
DOP CALC LVOT DIAMETER: 2.04 CM
DOP CALC LVOT PEAK VEL: 1.04 M/S
DOP CALC LVOT STROKE VOLUME: 81.02 CM3
DOP CALCLVOT PEAK VEL VTI: 24.8 CM
E WAVE DECELERATION TIME: 147.65 MSEC
E/A RATIO: 1.27
E/E' RATIO: 17.17 M/S
ECHO LV POSTERIOR WALL: 1.06 CM (ref 0.6–1.1)
FRACTIONAL SHORTENING: 23 % (ref 28–44)
INTERVENTRICULAR SEPTUM: 1.03 CM (ref 0.6–1.1)
IVRT: 159.85 MSEC
LEFT ATRIUM SIZE: 3.41 CM
LEFT ATRIUM VOLUME INDEX MOD: 24.3 ML/M2
LEFT ATRIUM VOLUME MOD: 46.17 CM3
LEFT INTERNAL DIMENSION IN SYSTOLE: 3.37 CM (ref 2.1–4)
LEFT VENTRICLE DIASTOLIC VOLUME INDEX: 46.25 ML/M2
LEFT VENTRICLE DIASTOLIC VOLUME: 87.87 ML
LEFT VENTRICLE MASS INDEX: 83 G/M2
LEFT VENTRICLE SYSTOLIC VOLUME INDEX: 24.4 ML/M2
LEFT VENTRICLE SYSTOLIC VOLUME: 46.3 ML
LEFT VENTRICULAR INTERNAL DIMENSION IN DIASTOLE: 4.4 CM (ref 3.5–6)
LEFT VENTRICULAR MASS: 157.16 G
LV LATERAL E/E' RATIO: 17.17 M/S
LV SEPTAL E/E' RATIO: 17.17 M/S
LVOT MG: 2.33 MMHG
LVOT MV: 0.73 CM/S
MV PEAK A VEL: 0.81 M/S
MV PEAK E VEL: 1.03 M/S
MV STENOSIS PRESSURE HALF TIME: 42.82 MS
MV VALVE AREA P 1/2 METHOD: 5.14 CM2
NUC STRESS EJECTION FRACTION: 67 %
OHS CV CPX 1 MINUTE RECOVERY HEART RATE: 59 BPM
OHS CV CPX 85 PERCENT MAX PREDICTED HEART RATE MALE: 124
OHS CV CPX MAX PREDICTED HEART RATE: 146
OHS CV CPX PATIENT IS FEMALE: 0
OHS CV CPX PATIENT IS MALE: 1
OHS CV CPX PEAK DIASTOLIC BLOOD PRESSURE: 98 MMHG
OHS CV CPX PEAK HEAR RATE: 130 BPM
OHS CV CPX PEAK RATE PRESSURE PRODUCT: NORMAL
OHS CV CPX PEAK SYSTOLIC BLOOD PRESSURE: 131 MMHG
OHS CV CPX PERCENT MAX PREDICTED HEART RATE ACHIEVED: 89
OHS CV CPX RATE PRESSURE PRODUCT PRESENTING: 6812
OHS CV PHARM TIME: 953 MIN
PISA TR MAX VEL: 3.31 M/S
PULM VEIN S/D RATIO: 0.73
PV PEAK D VEL: 0.6 M/S
PV PEAK S VEL: 0.44 M/S
RA MAJOR: 4.18 CM
RA PRESSURE ESTIMATED: 3 MMHG
RA WIDTH: 3.9 CM
RIGHT VENTRICULAR END-DIASTOLIC DIMENSION: 3.5 CM
RIGHT VENTRICULAR LENGTH IN DIASTOLE (APICAL 4-CHAMBER VIEW): 4.02 CM
RV MID DIAMA: 2.67 CM
RV TB RVSP: 6 MMHG
RV TISSUE DOPPLER FREE WALL SYSTOLIC VELOCITY 1 (APICAL 4 CHAMBER VIEW): 10.48 CM/S
SINUS: 3.03 CM
STJ: 2.27 CM
SYSTOLIC BLOOD PRESSURE: 131 MMHG
TDI LATERAL: 0.06 M/S
TDI SEPTAL: 0.06 M/S
TDI: 0.06 M/S
TR MAX PG: 44 MMHG
TRICUSPID ANNULAR PLANE SYSTOLIC EXCURSION: 1.5 CM
TV REST PULMONARY ARTERY PRESSURE: 47 MMHG
Z-SCORE OF LEFT VENTRICULAR DIMENSION IN END DIASTOLE: -1.92
Z-SCORE OF LEFT VENTRICULAR DIMENSION IN END SYSTOLE: 0.2

## 2024-01-08 ENCOUNTER — TELEPHONE (OUTPATIENT)
Dept: CARDIOLOGY | Facility: CLINIC | Age: 74
End: 2024-01-08
Payer: MEDICARE

## 2024-01-08 ENCOUNTER — TELEPHONE (OUTPATIENT)
Dept: FAMILY MEDICINE | Facility: CLINIC | Age: 74
End: 2024-01-08
Payer: MEDICARE

## 2024-01-08 NOTE — TELEPHONE ENCOUNTER
----- Message from Teagan Gaxiola sent at 1/8/2024 12:03 PM CST -----  Contact: self  Type:  Test Results    Who Called: Pt  Name of Test (Lab/Mammo/Etc):  Echo, Stress, Nuclear  Date of Test:  12/26/23  Ordering Provider: Dr. Mayen  Where the test was performed: [facility]  Would the patient rather a call back or a response via [facility]?  call  Best Call Back Number:  [phone]  Please call to advise... Thank you...

## 2024-01-08 NOTE — TELEPHONE ENCOUNTER
----- Message from Yariel Bush sent at 1/8/2024  1:50 PM CST -----  Contact: self  Type: Sooner Appointment Request        Caller is requesting a sooner appointment. Caller declined first available appointment listed below. Caller will not accept being placed on the waitlist and is requesting a message be sent to doctor.        Name of Caller: Patient    Best Call Back Number: [phone]  Additional Information: Plz call pt back. About results from stress test  and back pain. Thanks

## 2024-01-08 NOTE — TELEPHONE ENCOUNTER
----- Message from Yariel Bush sent at 1/8/2024  1:50 PM CST -----  Contact: self  Type: Sooner Appointment Request        Caller is requesting a sooner appointment. Caller declined first available appointment listed below. Caller will not accept being placed on the waitlist and is requesting a message be sent to doctor.        Name of Caller: Patient    Best Call Back Number: [phone]  Additional Information: Plz call pt back. About results from stress test  and back pain. Thanks

## 2024-01-08 NOTE — TELEPHONE ENCOUNTER
----- Message from Teagan Gaxiola sent at 1/8/2024 12:07 PM CST -----  Contact: self  Type:  Needs Medical Advice    Who Called: Pt  Symptoms (please be specific):  Back pain   How long has patient had these symptoms: all weekend  Pharmacy name and phone #:    [facility] [address] [address]  Phone: [phone] Fax: [phone]  Would the patient rather a call back or a response via [facility]?  Call   Best Call Back Number:  [phone]    Additional Information: Pt states he would like to have something sent to the pharmacy for his back pain... Please call to advise... Thank you...

## 2024-01-09 ENCOUNTER — TELEPHONE (OUTPATIENT)
Dept: CARDIOLOGY | Facility: CLINIC | Age: 74
End: 2024-01-09
Payer: MEDICARE

## 2024-01-09 NOTE — TELEPHONE ENCOUNTER
----- Message from Keeann Obrien sent at 1/9/2024 11:20 AM CST -----  Type: Needs Medical Advice  Who Called:  Patient    Best Call Back Number:  [phone]  Additional Information: Patient states that he would like a callback regarding his stress test results.

## 2024-01-10 NOTE — TELEPHONE ENCOUNTER
Informed pt of test results (Echo and stress , normal, no immediate concerns, follow up as scheduled )

## 2024-01-11 ENCOUNTER — TELEPHONE (OUTPATIENT)
Dept: FAMILY MEDICINE | Facility: CLINIC | Age: 74
End: 2024-01-11
Payer: MEDICARE

## 2024-01-11 NOTE — TELEPHONE ENCOUNTER
----- Message from Diamone Speed sent at 1/11/2024 10:32 AM CST -----  Regarding: self  Type: Patient Call Back       Who called: self        What is the request in detail: pt stated that he have pain in both hip and mid back and wanting to know if he cane get something for the pain .       Can the clinic reply by [facility]? No       Would the patient rather a call back or a response via [facility]? Call back       Best call back number:[phone]            fall

## 2024-01-12 NOTE — TELEPHONE ENCOUNTER
----- Message from Khadra Claros sent at 1/25/2022 10:52 AM CST -----  Type:  RX Refill Request    Who Called:  pt  Refill or New Rx:  refill  RX Name and Strength:    furosemide (LASIX) 20 MG tablet  Take 1 tablet (20 mg total) by mouth once daily        Preferred Pharmacy with phone number:    [facility], [address] [address]  Phone: [phone] Fax: [phone]      Local or Mail Order:    Ordering Provider:  Melania Marie Call Back Number:  [phone] (home)     Additional Information:         56

## 2024-01-18 ENCOUNTER — TELEPHONE (OUTPATIENT)
Dept: FAMILY MEDICINE | Facility: CLINIC | Age: 74
End: 2024-01-18
Payer: MEDICARE

## 2024-01-18 NOTE — TELEPHONE ENCOUNTER
----- Message from Zulay Garza sent at 1/18/2024  3:00 PM CST -----  Contact: pt [phone]  Type: Needs Medical Advice  Who Called:  Pt   Symptoms (please be specific):  Back pain     Best Call Back Number: [phone]    Additional Information: Pt had to clx appt for tomorrow due to transportation issues. He is requesting a nurse call him back to discuss his back pain. Pls call back and advise

## 2024-01-29 ENCOUNTER — TELEPHONE (OUTPATIENT)
Dept: FAMILY MEDICINE | Facility: CLINIC | Age: 74
End: 2024-01-29
Payer: MEDICARE

## 2024-01-29 NOTE — TELEPHONE ENCOUNTER
----- Message from Tone Griffin sent at 1/29/2024  9:30 AM CST -----  Regarding: advise  Contact: patient  Type: Needs Medical Advice  Who Called:  Patient   Symptoms (please be specific):  back pain   How long has patient had these symptoms:  weeks   Pharmacy name and phone #:      [facility]. [address] [address]  Phone: [phone] Fax: [phone]    Best Call Back Number: [phone]  Additional Information: Pt stated that he his having pain in his lower back and legs. Please call to advise. Thanks

## 2024-02-01 ENCOUNTER — TELEPHONE (OUTPATIENT)
Dept: FAMILY MEDICINE | Facility: CLINIC | Age: 74
End: 2024-02-01
Payer: MEDICARE

## 2024-02-01 NOTE — TELEPHONE ENCOUNTER
----- Message from Yariel Bush sent at 2/1/2024  9:39 AM CST -----  Contact: self  Type: Sooner Appointment Request        Caller is requesting a sooner appointment. Caller declined first available appointment listed below. Caller will not accept being placed on the waitlist and is requesting a message be sent to doctor.        Name of Caller: Patient   Best Call Back Number: [phone]  Additional Information: Pt said his legs has gave out on him. Pt elbows is hurting as well pt said he is not feeling well at all. Plz call pt to advise. Pt said he wont be able to come to the office sick and not feeling well. Thanks

## 2024-02-21 PROBLEM — M62.82 NON-TRAUMATIC RHABDOMYOLYSIS: Status: ACTIVE | Noted: 2024-02-21

## 2024-02-21 PROBLEM — R56.9 SEIZURE: Status: ACTIVE | Noted: 2024-02-20

## 2024-02-21 PROBLEM — G40.901 STATUS EPILEPTICUS: Status: ACTIVE | Noted: 2024-02-21

## 2024-02-21 PROBLEM — R74.8 ELEVATED CPK: Status: ACTIVE | Noted: 2024-02-20

## 2024-02-21 PROBLEM — R74.01 TRANSAMINITIS: Status: ACTIVE | Noted: 2024-02-20

## 2024-02-21 PROBLEM — J98.8 AIRWAY COMPROMISE: Status: ACTIVE | Noted: 2024-02-20

## 2024-02-24 PROBLEM — E87.0 HYPERNATREMIA: Status: ACTIVE | Noted: 2024-02-24

## 2024-02-24 PROBLEM — K92.2 GIB (GASTROINTESTINAL BLEEDING): Status: ACTIVE | Noted: 2024-02-24

## 2024-02-26 PROBLEM — R79.89 TROPONIN LEVEL ELEVATED: Status: ACTIVE | Noted: 2024-02-26

## 2024-02-26 PROBLEM — J96.01 ACUTE HYPOXIC RESPIRATORY FAILURE: Status: ACTIVE | Noted: 2024-02-26

## 2024-02-27 PROBLEM — G93.41 ENCEPHALOPATHY, METABOLIC: Status: ACTIVE | Noted: 2024-02-27

## 2024-02-28 DIAGNOSIS — Z98.890 S/P LUMBAR SPINE OPERATION: ICD-10-CM

## 2024-02-28 DIAGNOSIS — F41.9 ANXIETY: ICD-10-CM

## 2024-02-28 RX ORDER — DIAZEPAM 5 MG/1
5 TABLET ORAL NIGHTLY PRN
Qty: 30 TABLET | Refills: 1 | OUTPATIENT
Start: 2024-03-06 | End: 2024-03-20

## 2024-02-28 NOTE — TELEPHONE ENCOUNTER
No care due was identified.  Health [facility] Embedded Care Due Messages. Reference number: [id number].   2/28/2024 7:39:13 AM CST

## 2024-03-13 PROBLEM — R74.01 TRANSAMINITIS: Status: RESOLVED | Noted: 2024-02-20 | Resolved: 2024-03-13

## 2024-03-13 PROBLEM — Z71.89 ACP (ADVANCE CARE PLANNING): Status: ACTIVE | Noted: 2024-03-13

## 2024-03-13 PROBLEM — R79.89 TROPONIN LEVEL ELEVATED: Status: RESOLVED | Noted: 2024-02-26 | Resolved: 2024-03-13

## 2024-03-13 PROBLEM — R41.0 DELIRIUM: Status: ACTIVE | Noted: 2024-03-13

## 2024-03-13 PROBLEM — R74.8 ELEVATED CPK: Status: RESOLVED | Noted: 2024-02-20 | Resolved: 2024-03-13

## 2024-03-13 PROBLEM — G93.41 ENCEPHALOPATHY, METABOLIC: Status: RESOLVED | Noted: 2024-02-27 | Resolved: 2024-03-13

## 2024-03-13 PROBLEM — E87.0 HYPERNATREMIA: Status: RESOLVED | Noted: 2024-02-24 | Resolved: 2024-03-13

## 2024-03-13 PROBLEM — E78.5 DYSLIPIDEMIA: Status: RESOLVED | Noted: 2019-05-08 | Resolved: 2024-03-13

## 2024-03-13 PROBLEM — F17.210 CIGARETTE SMOKER: Status: RESOLVED | Noted: 2019-05-08 | Resolved: 2024-03-13

## 2024-03-20 NOTE — TELEPHONE ENCOUNTER
2nd call placed to cancel and reschedule appt on 11-25-22 with Dr. Rivers. No answer. V/M left. Call placed to Pt's daughter and states she will have him call to reschedule.   
EOS calculated successfully. EOS Risk Factor: 0.5/1000 live births ([facility] national incidence); GA=38w5d; Temp=98.1; ROM=9.75; GBS='Negative'; Antibiotics='No antibiotics or any antibiotics < 2 hrs prior to birth'

## 2024-04-07 ENCOUNTER — NURSE TRIAGE (OUTPATIENT)
Dept: ADMINISTRATIVE | Facility: CLINIC | Age: 74
End: 2024-04-07
Payer: MEDICARE

## 2024-04-10 ENCOUNTER — LAB VISIT (OUTPATIENT)
Dept: LAB | Facility: HOSPITAL | Age: 74
End: 2024-04-10
Attending: FAMILY MEDICINE
Payer: MEDICARE

## 2024-04-10 ENCOUNTER — TELEPHONE (OUTPATIENT)
Dept: FAMILY MEDICINE | Facility: CLINIC | Age: 74
End: 2024-04-10
Payer: MEDICARE

## 2024-04-10 ENCOUNTER — OFFICE VISIT (OUTPATIENT)
Dept: FAMILY MEDICINE | Facility: CLINIC | Age: 74
End: 2024-04-10
Payer: MEDICARE

## 2024-04-10 VITALS
SYSTOLIC BLOOD PRESSURE: 156 MMHG | OXYGEN SATURATION: 95 % | WEIGHT: 171.94 LBS | RESPIRATION RATE: 18 BRPM | HEIGHT: 64 IN | BODY MASS INDEX: 29.35 KG/M2 | DIASTOLIC BLOOD PRESSURE: 82 MMHG | HEART RATE: 96 BPM

## 2024-04-10 DIAGNOSIS — M54.42 CHRONIC BILATERAL LOW BACK PAIN WITH BILATERAL SCIATICA: ICD-10-CM

## 2024-04-10 DIAGNOSIS — Z12.2 SCREENING FOR LUNG CANCER: ICD-10-CM

## 2024-04-10 DIAGNOSIS — M79.605 PAIN IN BOTH LOWER EXTREMITIES: ICD-10-CM

## 2024-04-10 DIAGNOSIS — I10 ESSENTIAL HYPERTENSION: ICD-10-CM

## 2024-04-10 DIAGNOSIS — I25.708 CORONARY ARTERY DISEASE OF BYPASS GRAFT OF NATIVE HEART WITH STABLE ANGINA PECTORIS: ICD-10-CM

## 2024-04-10 DIAGNOSIS — R53.1 WEAKNESS GENERALIZED: ICD-10-CM

## 2024-04-10 DIAGNOSIS — G40.909 SEIZURE DISORDER: ICD-10-CM

## 2024-04-10 DIAGNOSIS — E61.1 IRON DEFICIENCY: Primary | ICD-10-CM

## 2024-04-10 DIAGNOSIS — M79.604 PAIN IN BOTH LOWER EXTREMITIES: ICD-10-CM

## 2024-04-10 DIAGNOSIS — M54.41 CHRONIC BILATERAL LOW BACK PAIN WITH BILATERAL SCIATICA: ICD-10-CM

## 2024-04-10 DIAGNOSIS — E61.1 IRON DEFICIENCY: ICD-10-CM

## 2024-04-10 DIAGNOSIS — I73.9 PAD (PERIPHERAL ARTERY DISEASE): ICD-10-CM

## 2024-04-10 DIAGNOSIS — F17.200 TOBACCO DEPENDENCE: ICD-10-CM

## 2024-04-10 DIAGNOSIS — F32.4 MAJOR DEPRESSIVE DISORDER, SINGLE EPISODE, IN PARTIAL REMISSION: ICD-10-CM

## 2024-04-10 DIAGNOSIS — G89.29 CHRONIC BILATERAL LOW BACK PAIN WITH BILATERAL SCIATICA: ICD-10-CM

## 2024-04-10 LAB
ALBUMIN SERPL BCP-MCNC: 3.7 G/DL (ref 3.5–5.2)
ALP SERPL-CCNC: 70 U/L (ref 55–135)
ALT SERPL W/O P-5'-P-CCNC: 14 U/L (ref 10–44)
ANION GAP SERPL CALC-SCNC: 9 MMOL/L (ref 8–16)
AST SERPL-CCNC: 22 U/L (ref 10–40)
BASOPHILS # BLD AUTO: 0.07 K/UL (ref 0–0.2)
BASOPHILS NFR BLD: 0.8 % (ref 0–1.9)
BILIRUB SERPL-MCNC: 0.4 MG/DL (ref 0.1–1)
BUN SERPL-MCNC: 10 MG/DL (ref 8–23)
CALCIUM SERPL-MCNC: 9.6 MG/DL (ref 8.7–10.5)
CHLORIDE SERPL-SCNC: 104 MMOL/L (ref 95–110)
CHOLEST SERPL-MCNC: 163 MG/DL (ref 120–199)
CHOLEST/HDLC SERPL: 3.3 {RATIO} (ref 2–5)
CO2 SERPL-SCNC: 28 MMOL/L (ref 23–29)
CREAT SERPL-MCNC: 0.9 MG/DL (ref 0.5–1.4)
DIFFERENTIAL METHOD BLD: ABNORMAL
EOSINOPHIL # BLD AUTO: 0.1 K/UL (ref 0–0.5)
EOSINOPHIL NFR BLD: 1.3 % (ref 0–8)
ERYTHROCYTE [DISTWIDTH] IN BLOOD BY AUTOMATED COUNT: 13.5 % (ref 11.5–14.5)
EST. GFR  (NO RACE VARIABLE): >60 ML/MIN/1.73 M^2
FERRITIN SERPL-MCNC: 182 NG/ML (ref 20–300)
GLUCOSE SERPL-MCNC: 88 MG/DL (ref 70–110)
HCT VFR BLD AUTO: 43.2 % (ref 40–54)
HDLC SERPL-MCNC: 49 MG/DL (ref 40–75)
HDLC SERPL: 30.1 % (ref 20–50)
HGB BLD-MCNC: 13.1 G/DL (ref 14–18)
IMM GRANULOCYTES # BLD AUTO: 0.02 K/UL (ref 0–0.04)
IMM GRANULOCYTES NFR BLD AUTO: 0.2 % (ref 0–0.5)
IRON SERPL-MCNC: 37 UG/DL (ref 45–160)
LDLC SERPL CALC-MCNC: 97.8 MG/DL (ref 63–159)
LYMPHOCYTES # BLD AUTO: 1.4 K/UL (ref 1–4.8)
LYMPHOCYTES NFR BLD: 16.2 % (ref 18–48)
MCH RBC QN AUTO: 27.8 PG (ref 27–31)
MCHC RBC AUTO-ENTMCNC: 30.3 G/DL (ref 32–36)
MCV RBC AUTO: 92 FL (ref 82–98)
MONOCYTES # BLD AUTO: 0.5 K/UL (ref 0.3–1)
MONOCYTES NFR BLD: 6.3 % (ref 4–15)
NEUTROPHILS # BLD AUTO: 6.4 K/UL (ref 1.8–7.7)
NEUTROPHILS NFR BLD: 75.2 % (ref 38–73)
NONHDLC SERPL-MCNC: 114 MG/DL
NRBC BLD-RTO: 0 /100 WBC
PLATELET # BLD AUTO: 263 K/UL (ref 150–450)
PMV BLD AUTO: 12.7 FL (ref 9.2–12.9)
POTASSIUM SERPL-SCNC: 3.9 MMOL/L (ref 3.5–5.1)
PROT SERPL-MCNC: 7.4 G/DL (ref 6–8.4)
RBC # BLD AUTO: 4.71 M/UL (ref 4.6–6.2)
SATURATED IRON: 13 % (ref 20–50)
SODIUM SERPL-SCNC: 141 MMOL/L (ref 136–145)
TOTAL IRON BINDING CAPACITY: 284 UG/DL (ref 250–450)
TRANSFERRIN SERPL-MCNC: 192 MG/DL (ref 200–375)
TRIGL SERPL-MCNC: 81 MG/DL (ref 30–150)
WBC # BLD AUTO: 8.52 K/UL (ref 3.9–12.7)

## 2024-04-10 PROCEDURE — 1101F PT FALLS ASSESS-DOCD LE1/YR: CPT | Mod: CPTII,S$GLB,, | Performed by: FAMILY MEDICINE

## 2024-04-10 PROCEDURE — 3077F SYST BP >= 140 MM HG: CPT | Mod: CPTII,S$GLB,, | Performed by: FAMILY MEDICINE

## 2024-04-10 PROCEDURE — 80053 COMPREHEN METABOLIC PANEL: CPT | Performed by: FAMILY MEDICINE

## 2024-04-10 PROCEDURE — 3288F FALL RISK ASSESSMENT DOCD: CPT | Mod: CPTII,S$GLB,, | Performed by: FAMILY MEDICINE

## 2024-04-10 PROCEDURE — 36415 COLL VENOUS BLD VENIPUNCTURE: CPT | Mod: PO | Performed by: FAMILY MEDICINE

## 2024-04-10 PROCEDURE — 99214 OFFICE O/P EST MOD 30 MIN: CPT | Mod: S$GLB,,, | Performed by: FAMILY MEDICINE

## 2024-04-10 PROCEDURE — 3079F DIAST BP 80-89 MM HG: CPT | Mod: CPTII,S$GLB,, | Performed by: FAMILY MEDICINE

## 2024-04-10 PROCEDURE — 1125F AMNT PAIN NOTED PAIN PRSNT: CPT | Mod: CPTII,S$GLB,, | Performed by: FAMILY MEDICINE

## 2024-04-10 PROCEDURE — 1111F DSCHRG MED/CURRENT MED MERGE: CPT | Mod: CPTII,S$GLB,, | Performed by: FAMILY MEDICINE

## 2024-04-10 PROCEDURE — 4010F ACE/ARB THERAPY RXD/TAKEN: CPT | Mod: CPTII,S$GLB,, | Performed by: FAMILY MEDICINE

## 2024-04-10 PROCEDURE — 99999 PR PBB SHADOW E&M-EST. PATIENT-LVL V: CPT | Mod: PBBFAC,,, | Performed by: FAMILY MEDICINE

## 2024-04-10 PROCEDURE — 1159F MED LIST DOCD IN RCRD: CPT | Mod: CPTII,S$GLB,, | Performed by: FAMILY MEDICINE

## 2024-04-10 PROCEDURE — 85025 COMPLETE CBC W/AUTO DIFF WBC: CPT | Performed by: FAMILY MEDICINE

## 2024-04-10 PROCEDURE — 83540 ASSAY OF IRON: CPT | Performed by: FAMILY MEDICINE

## 2024-04-10 PROCEDURE — 80061 LIPID PANEL: CPT | Performed by: FAMILY MEDICINE

## 2024-04-10 PROCEDURE — 3008F BODY MASS INDEX DOCD: CPT | Mod: CPTII,S$GLB,, | Performed by: FAMILY MEDICINE

## 2024-04-10 PROCEDURE — 82728 ASSAY OF FERRITIN: CPT | Performed by: FAMILY MEDICINE

## 2024-04-10 RX ORDER — ATENOLOL 100 MG/1
100 TABLET ORAL DAILY
Start: 2024-04-10 | End: 2024-05-29

## 2024-04-10 RX ORDER — HYDROCODONE BITARTRATE AND ACETAMINOPHEN 10; 325 MG/1; MG/1
1 TABLET ORAL EVERY 12 HOURS PRN
Qty: 14 TABLET | Refills: 0 | Status: SHIPPED | OUTPATIENT
Start: 2024-04-10 | End: 2024-04-17

## 2024-04-10 RX ORDER — LISINOPRIL 20 MG/1
20 TABLET ORAL DAILY
Qty: 90 TABLET | Refills: 3 | Status: SHIPPED | OUTPATIENT
Start: 2024-04-10 | End: 2025-04-10

## 2024-04-10 NOTE — PROGRESS NOTES
Assessment:       1. Iron deficiency    2. Major depressive disorder, single episode, in partial remission    3. Coronary artery disease of bypass graft of native heart with stable angina pectoris    4. PAD (peripheral artery disease)    5. Seizure disorder    6. Tobacco dependence    7. Weakness generalized    8. Screening for lung cancer    9. Essential hypertension    10. Chronic bilateral low back pain with bilateral sciatica    11. Pain in both lower extremities        Assessment & Plan  Iron deficiency:  Stable  -     Iron and TIBC; Future; Expected date: 04/10/2024  -     Ferritin; Future; Expected date: 04/10/2024  -     CBC Auto Differential; Future; Expected date: 04/10/2024    Major depressive disorder, single episode, in partial remission: Worsening    Coronary artery disease of bypass graft of native heart with stable angina pectoris: Stable  -     Comprehensive Metabolic Panel; Future; Expected date: 04/10/2024  -     Lipid Panel; Future; Expected date: 04/10/2024  Recommend the patient to follow-up with the cardiologist and to make the appointment.    PAD (peripheral artery disease):  Stable  -     US Lower Extrem Arteries Bilat with OLIVA (xpd); Future; Expected date: 04/10/2024    Seizure disorder:  New problem workup needed  -     Ambulatory referral/consult to Neurology; Future; Expected date: 04/17/2024  -     WALKER FOR HOME USE  Patient will refill his seizure medications until he sees the neurologist.    Tobacco dependence:  Worsening.  The patient was advised to quit tobacco, he is in the contemplation phase.    Weakness generalized: Worsening  -     WALKER FOR HOME USE    Addendum:   Will order a walker  because he mobility limitation cannot be sufficiently resolved by the use of a cane. Patient's mobility limitation significantly impairs their ability to participate in one of more activities of daily living. The use of a  rolling walker will significantly improve the patient's ability to  participate in MRADLS and the patient will use it on regular basis in the home.     Screening for lung cancer  -     CT Chest Lung Screening Low Dose; Future; Expected date: 04/10/2024    Essential hypertension: Uncontrolled  -     atenoloL (TENORMIN) 100 MG tablet; Take 1 tablet (100 mg total) by mouth once daily.  -     lisinopriL (PRINIVIL,ZESTRIL) 20 MG tablet; Take 1 tablet (20 mg total) by mouth once daily.  Dispense: 90 tablet; Refill: 3  Will increase the dosage of the lisinopril to 20 mg in the morning and atenolol at bedtime.    Chronic bilateral low back pain with bilateral sciatica: Worsening.-     HYDROcodone-acetaminophen (NORCO)  mg per tablet; Take 1 tablet by mouth every 12 (twelve) hours as needed for Pain.  Dispense: 14 tablet; Refill: 0  [place] prescription monitoring program was checked and okay, hydrocodone was prescribed for the patient.  The patient was advised not take this medication at the same time with Valium due to side effects.  Given 14 tablets and no refills.    Pain in both lower extremities: Worsening  -     HYDROcodone-acetaminophen (NORCO)  mg per tablet; Take 1 tablet by mouth every 12 (twelve) hours as needed for Pain.  Dispense: 14 tablet; Refill: 0         Follow-up  The patient is scheduled for a follow-up visit in 2 weeks for BP check.          The patient's BMI has been recorded in the chart. The patient has been provided educational materials regarding the benefits of attaining and maintaining a normal weight. We will continue to address and follow this issue during follow up visits.   Patient agreed with assessment and plan. Patient verbalized understanding.     Subjective:       Patient ID: Alexis Medrano Jr. is a 73 y.o. male.    Chief Complaint: Follow-up hospital admission    HPI  History of Present Illness  The patient presents today for follow-up visit from hospital admission.    The patient acknowledges a slight elevation in his blood pressure, despite  having taken his medication today. His current medication regimen includes lisinopril 10 mg, and atenolol 100 mg.  The patient is unsure about any other medications, as his medications were change during the hospital admission, he did not bring his medicines with him today.    The patient has a history of seizures and was recommended to take haloperidol. He has exhausted his supply of Briviact and Vimpat and is contemplating whether to continue its use. The etiology of his seizures remains undetermined.    The patient is experiencing significant back and leg pain, which he attributes to poor weight. He is seeking a prescription for a walker with wheels. He has been taking Valium. He has undergone 3 weeks of therapy, which has improved his mobility. He is requesting hydrocodone as medication over-the-counter can not help and is in severe pain.    Supplemental Information  He is still taking iron tablets. He was given pantoprazole in the hospital.   He admits to smoking cigarettes, albeit less frequently than before. He started smoking at the age of 15. He denies alcohol intake since he was hospitalized.       Past medical history, past social history was reviewed and discussed with the patient.    Review of Systems   Constitutional:  Positive for fatigue and unexpected weight change.   HENT:  Negative for congestion and postnasal drip.    Cardiovascular:  Negative for chest pain and palpitations.   Musculoskeletal:  Positive for arthralgias, back pain and gait problem.   Neurological:  Positive for weakness.   Psychiatric/Behavioral:  Positive for decreased concentration, dysphoric mood and sleep disturbance. The patient is nervous/anxious.        Objective:      Physical Exam  Vitals and nursing note reviewed.   Constitutional:       General: He is not in acute distress.     Appearance: Normal appearance. He is well-developed. He is not diaphoretic.      Comments: The patient is in no acute distress, he has using a  cane for mobilization   HENT:      Head: Normocephalic and atraumatic.      Right Ear: External ear normal.      Left Ear: External ear normal.      Nose: Nose normal.   Eyes:      General: No scleral icterus.        Left eye: No discharge.   Cardiovascular:      Rate and Rhythm: Normal rate and regular rhythm.      Heart sounds: Normal heart sounds.   Pulmonary:      Effort: Pulmonary effort is normal. No respiratory distress.      Breath sounds: Decreased air movement and transmitted upper airway sounds present. Decreased breath sounds present.   Musculoskeletal:         General: Tenderness (Back, legs) present.      Cervical back: Normal range of motion and neck supple.   Skin:     General: Skin is warm and dry.      Coloration: Skin is not pale.   Neurological:      Mental Status: He is alert.      Motor: Weakness present. No abnormal muscle tone.      Gait: Gait abnormal.   Psychiatric:         Behavior: Behavior normal.         Thought Content: Thought content normal.         Judgment: Judgment normal.         Physical Exam  There is some swelling in the patient's legs.    Vital Signs  The patient's blood pressure is 158/82.     Results       This note was generated with the assistance of ambient listening technology. Verbal consent was obtained by the patient and accompanying visitor(s) for the recording of patient appointment to facilitate this note. I attest to having reviewed and edited the generated note for accuracy, though some syntax or spelling errors may persist. Please contact the author of this note for any clarification.

## 2024-04-11 ENCOUNTER — TELEPHONE (OUTPATIENT)
Dept: FAMILY MEDICINE | Facility: CLINIC | Age: 74
End: 2024-04-11
Payer: MEDICARE

## 2024-04-11 NOTE — TELEPHONE ENCOUNTER
stated: Please notify patient that the iron studies are low but the hemoglobin is improving.  This went up which is good.      Cholesterol levels are in good range.  Kidney function, liver enzymes and electrolytes are normal.  Thank you.      Called pt and notified of above results

## 2024-04-11 NOTE — TELEPHONE ENCOUNTER
Please notify patient that the iron studies are low but the hemoglobin is improving.  This went up which is good.     Cholesterol levels are in good range.  Kidney function, liver enzymes and electrolytes are normal.  Thank you.

## 2024-04-11 NOTE — TELEPHONE ENCOUNTER
----- Message from Zulay Garza sent at 4/11/2024  9:10 AM CDT -----  Contact: pt [phone]  Type:  Test Results    Who Called:  Pt   Name of Test (Lab/Mammo/Etc):  lab   Date of Test:  04/10  Ordering Provider:  Melania   Where the test was performed:  [facility]   Best Call Back Number:  [phone]  Additional Information:  Pls call back and advise

## 2024-04-16 NOTE — TELEPHONE ENCOUNTER
----- Message from Denisa Arce sent at 4/16/2024 10:56 AM CDT -----  Contact: Rosa from [facility]  Type:  Needs Medical Advice    Who Called: Rosa LYLES  Symptoms (please be specific): needs a refill on rx, brivaracetam (BRIVIACT) 10 mg/mL Soln, 2 times daily      Pharmacy name and phone #:        [facility] [address]  Phone: [phone] Fax: [phone]        Would the patient rather a call back or a response via [facility]? call  Best Call Back Number: [phone] (home) [phone] (work)    Additional Information: please advise and thank you.

## 2024-04-19 ENCOUNTER — TELEPHONE (OUTPATIENT)
Dept: FAMILY MEDICINE | Facility: CLINIC | Age: 74
End: 2024-04-19
Payer: MEDICARE

## 2024-04-19 DIAGNOSIS — Z98.890 S/P LUMBAR SPINE OPERATION: Primary | ICD-10-CM

## 2024-04-19 DIAGNOSIS — M51.36 DEGENERATIVE DISC DISEASE, LUMBAR: ICD-10-CM

## 2024-04-19 DIAGNOSIS — M50.30 DEGENERATIVE DISC DISEASE, CERVICAL: ICD-10-CM

## 2024-04-19 NOTE — TELEPHONE ENCOUNTER
----- Message from Ernestina Wright sent at 4/19/2024 12:35 PM CDT -----  Contact: Self  Type:  RX Refill Request    Who Called:  Self  Refill or New Rx:  refill  RX Name and Strength:  hyrdocodone  Preferred Pharmacy with phone number:    [id number] - [address] [address]  Phone: [phone] Fax: [phone]  Local or Mail Order:  Local  Ordering Provider:  Melania Marie Call Back Number:  [phone]  Please call the patient back at the phone number listed above to advise. The also never got his walker. Thank you!

## 2024-04-19 NOTE — TELEPHONE ENCOUNTER
Attempted to contact pt, no answer no voicemail available. Will try to call back at a later time.

## 2024-04-22 ENCOUNTER — TELEPHONE (OUTPATIENT)
Dept: FAMILY MEDICINE | Facility: CLINIC | Age: 74
End: 2024-04-22
Payer: MEDICARE

## 2024-04-22 NOTE — TELEPHONE ENCOUNTER
----- Message from Denisa Arce sent at 4/22/2024  7:03 AM CDT -----  Contact: self  Type:  Needs Medical Advice    Who Called: seelf  Symptoms (please be specific): pt needs to speak to dr about getting more medication, hydrocodone  mg and and a neurology appt. Please cll pt for more info.   Would the patient rather a call back or a response via [facility]? call  Best Call Back Number: [phone] (home) [phone] (work)        [facility], [address] [address]  Phone: [phone] Fax: [phone]        Additional Information: please advise and thank you.

## 2024-04-25 NOTE — TELEPHONE ENCOUNTER
----- Message from Mervin Ruby sent at 4/25/2024  2:43 PM CDT -----  Regarding: new pharmacy  Contact: patient  Type:  Patient Returning Call    Who Called: [facility]/ ph# [phone]  Who Left Message for Patient:office nurse  Does the patient know what this is regarding?:brivaracetam (BRIVIACT) 10 mg/mL Soln  Would the patient rather a call back or a response via [facility]? Patient wants to switch pharmacy due to delivery  Best Call Back Number:  [facility] [address] [address]  Phone: [phone] Fax: [phone]  Additional Information: please call patient/ also medication: Hydrocodone patient is requesting a new order for pain

## 2024-04-28 DIAGNOSIS — E61.1 IRON DEFICIENCY: ICD-10-CM

## 2024-04-28 DIAGNOSIS — F41.9 ANXIETY: ICD-10-CM

## 2024-04-28 DIAGNOSIS — J44.9 CHRONIC OBSTRUCTIVE PULMONARY DISEASE, UNSPECIFIED COPD TYPE: ICD-10-CM

## 2024-04-28 NOTE — TELEPHONE ENCOUNTER
No care due was identified.  [facility] Embedded Care Due Messages. Reference number: [id number].   4/28/2024 7:01:21 AM CDT

## 2024-04-29 RX ORDER — FINASTERIDE 5 MG/1
5 TABLET, FILM COATED ORAL
Qty: 90 TABLET | Refills: 3 | Status: SHIPPED | OUTPATIENT
Start: 2024-04-29

## 2024-04-29 RX ORDER — FERROUS SULFATE 325(65) MG
TABLET ORAL
Qty: 90 TABLET | Refills: 1 | Status: SHIPPED | OUTPATIENT
Start: 2024-04-29

## 2024-04-29 RX ORDER — ALBUTEROL SULFATE 90 UG/1
AEROSOL, METERED RESPIRATORY (INHALATION)
Qty: 20.1 G | Refills: 11 | OUTPATIENT
Start: 2024-04-29

## 2024-04-29 RX ORDER — ESCITALOPRAM OXALATE 20 MG/1
TABLET ORAL
Qty: 90 TABLET | Refills: 3 | Status: SHIPPED | OUTPATIENT
Start: 2024-04-29 | End: 2024-05-14 | Stop reason: SDUPTHER

## 2024-04-29 NOTE — TELEPHONE ENCOUNTER
Refill Routing Note   Medication(s) are not appropriate for processing by [facility] for the following reason(s):        Outside of protocol    [facility] action(s):  Route  Approve  Quick Discontinue               Appointments  past 12m or future 3m with PCP    Date Provider   Last Visit   4/10/2024 Kalani Velázquez MD   Next Visit   Visit date not found Kaalni Velázquez MD   ED visits in past 90 days: 0        Note composed:12:41 PM 04/29/2024              s

## 2024-05-02 ENCOUNTER — TELEPHONE (OUTPATIENT)
Dept: FAMILY MEDICINE | Facility: CLINIC | Age: 74
End: 2024-05-02
Payer: MEDICARE

## 2024-05-02 DIAGNOSIS — Z98.890 S/P LUMBAR SPINE OPERATION: ICD-10-CM

## 2024-05-02 DIAGNOSIS — M51.36 DEGENERATIVE DISC DISEASE, LUMBAR: ICD-10-CM

## 2024-05-02 DIAGNOSIS — M54.12 CERVICAL RADICULITIS: Primary | ICD-10-CM

## 2024-05-02 DIAGNOSIS — M50.30 DEGENERATIVE DISC DISEASE, CERVICAL: ICD-10-CM

## 2024-05-02 NOTE — TELEPHONE ENCOUNTER
Called pt back and spoke to him. He said in PT they will want him to walk and he can't because of pain. I do not see pain management referral. Do you want to place this?

## 2024-05-02 NOTE — TELEPHONE ENCOUNTER
Called pt back. In formed she has placed a referral for him for pain management. It is not letting me schedule so I informed him to call our main number and either ask to speak to pain management or get scheduled to come in.

## 2024-05-02 NOTE — TELEPHONE ENCOUNTER
----- Message from Maddie Penn sent at 5/2/2024  2:15 PM CDT -----  Contact: pt  Type: Needs Medical Advice         Who Called: pt  Best Call Back Number:[phone]    Additional Information: Requesting a call back regarding  pt returned call from office and asking for a call back. Pt said he is having issues with urination. Legs still having pain and asking for a refill    HYDROcodone-acetaminophen (NORCO)  mg per tablet      [id number] [place], [place] - [address] [place] [address]  Phone: [phone] Fax: [phone]      Please Advise- Thank you

## 2024-05-09 ENCOUNTER — HOSPITAL ENCOUNTER (OUTPATIENT)
Dept: RADIOLOGY | Facility: HOSPITAL | Age: 74
Discharge: HOME | End: 2024-05-09
Attending: FAMILY MEDICINE
Payer: MEDICARE

## 2024-05-09 ENCOUNTER — TELEPHONE (OUTPATIENT)
Dept: FAMILY MEDICINE | Facility: CLINIC | Age: 74
End: 2024-05-09
Payer: MEDICARE

## 2024-05-09 DIAGNOSIS — Z12.2 SCREENING FOR LUNG CANCER: ICD-10-CM

## 2024-05-09 DIAGNOSIS — I73.9 PAD (PERIPHERAL ARTERY DISEASE): ICD-10-CM

## 2024-05-09 PROCEDURE — 93925 LOWER EXTREMITY STUDY: CPT | Mod: 26,,, | Performed by: RADIOLOGY

## 2024-05-09 PROCEDURE — 71271 CT THORAX LUNG CANCER SCR C-: CPT | Mod: 26,,, | Performed by: STUDENT IN AN ORGANIZED HEALTH CARE EDUCATION/TRAINING PROGRAM

## 2024-05-09 PROCEDURE — 93922 UPR/L XTREMITY ART 2 LEVELS: CPT | Mod: 26,,, | Performed by: RADIOLOGY

## 2024-05-09 PROCEDURE — 93925 LOWER EXTREMITY STUDY: CPT | Mod: TC,PO

## 2024-05-09 PROCEDURE — 71271 CT THORAX LUNG CANCER SCR C-: CPT | Mod: TC,PO

## 2024-05-09 NOTE — TELEPHONE ENCOUNTER
----- Message from Denisa Arce sent at 5/9/2024  1:26 PM CDT -----  Type:  Needs Medical Advice    Who Called: self  Symptoms (please be specific): pt sts he is checking the status for a walker that dr vázquez, pt sts he never received it.   Would the patient rather a call back or a response via [facility]? call  Best Call Back Number: [phone] (home)    Additional Information: please advise and thank you.

## 2024-05-13 ENCOUNTER — TELEPHONE (OUTPATIENT)
Dept: FAMILY MEDICINE | Facility: CLINIC | Age: 74
End: 2024-05-13
Payer: MEDICARE

## 2024-05-13 NOTE — TELEPHONE ENCOUNTER
----- Message from Kalani Velázquez MD sent at 5/10/2024 10:12 AM CDT -----  Regarding: RE: Needs results   The 1st study on the lower extremities did not show presence of any significant narrowing but presence of plaque formation in the arteries on the lower extremities, this is mild.      On the chest CT, there is presence of changes compatible with a emphysema (COPD), benign findings, he will need to have another CT scan done in 1 year, to screen for lung cancer.      There is also presence of fusiform dilatation of the ascending aorta measuring 4.1 cm.  Calcific atherosclerosis of the aorta and coronary arteries. I will recommend to continue follow-up see the cardiologist. And to stop smoking.  Let me know if he wants to go to the smoking cessation Services for free.    Please give him an appointment for a follow-up with me in 3 months.  Can use 1 of the 15 minutes slot.  Thank you.  ----- Message -----  From: Clotilde Vega MA  Sent: 5/10/2024   8:51 AM CDT  To: Kalani Velázquez MD  Subject: FW: Needs results                                Please review and advise on Cxray and US  ----- Message -----  From: Jhoana Kwong  Sent: 5/10/2024   8:17 AM CDT  To: Melania Uriarte Staff  Subject: Needs results                                    Type: Needs Medical Advice  Who Called:  Pt    Best Call Back Number: [phone]    Additional Information: Pt was calling to see if his results from his test he did yesterday were in yet please call to isabelle

## 2024-05-14 DIAGNOSIS — F41.9 ANXIETY: ICD-10-CM

## 2024-05-14 RX ORDER — ESCITALOPRAM OXALATE 20 MG/1
20 TABLET ORAL NIGHTLY
Qty: 90 TABLET | Refills: 3 | Status: SHIPPED | OUTPATIENT
Start: 2024-05-14

## 2024-05-14 NOTE — TELEPHONE ENCOUNTER
No care due was identified.  [facility] Embedded Care Due Messages. Reference number: [id number].   5/14/2024 10:49:09 AM CDT

## 2024-05-14 NOTE — TELEPHONE ENCOUNTER
----- Message from Myriam Martini sent at 5/14/2024 10:36 AM CDT -----  Contact: pt  Type:  RX Refill Request    Who Called: pt     Refill or New Rx: refill    RX Name and Strength: 2 medications  EScitalopram oxalate (LEXAPRO) 20 MG tablet  Diazepam    How is the patient currently taking it? (ex. 1XDay):as directed    Is this a 30 day or 90 day RX:30    Preferred Pharmacy with phone number:    [facility] [address]  Phone: [phone] Fax: [phone]    Local or Mail Order: local mail order    Ordering Provider: Melania    Would the patient rather a call back or a response via [facility]?  Call back    Best Call Back Number: [phone]    Additional Information: please refill above scripts  Thanks

## 2024-05-15 ENCOUNTER — TELEPHONE (OUTPATIENT)
Dept: FAMILY MEDICINE | Facility: CLINIC | Age: 74
End: 2024-05-15
Payer: MEDICARE

## 2024-05-15 NOTE — TELEPHONE ENCOUNTER
----- Message from Jaqui Luu sent at 5/15/2024 10:26 AM CDT -----  Type:  Patient Returning Call    Who Called:  Pt    Who Left Message for Patient:  Clotilde    Does the patient know what this is regarding?:  yes    Would the patient rather a call back or a response via [facility]?  Call back    Best Call Back Number:  [phone]    Additional Information:  Pt is reaching out for results.  Please call back to advise. Thanks!

## 2024-05-15 NOTE — TELEPHONE ENCOUNTER
Called pt and spoke to him. Informed of past results in previous encounter. He stated he is having so  much pain and no one knows his level of pain. He would like to have something for this.    Got patient scheduled to come in on 8/15 at 9:00 am per PCP note and gave to super user.

## 2024-05-16 ENCOUNTER — TELEPHONE (OUTPATIENT)
Dept: FAMILY MEDICINE | Facility: CLINIC | Age: 74
End: 2024-05-16
Payer: MEDICARE

## 2024-05-16 NOTE — TELEPHONE ENCOUNTER
Tried to call pt and was unsuccessful in reaching. Stated unavailable and couldn't leave voicemail

## 2024-05-16 NOTE — TELEPHONE ENCOUNTER
----- Message from Daniel Taylor sent at 5/16/2024  1:46 PM CDT -----  Contact: Self  Type: Needs Medical Advice  Who Called:  PT    Best Call Back Number: [phone]   Additional Information: PT would like a call back to discuss the test again that were done on 05/09.

## 2024-05-21 DIAGNOSIS — Z98.890 S/P LUMBAR SPINE OPERATION: ICD-10-CM

## 2024-05-21 DIAGNOSIS — F41.9 ANXIETY: ICD-10-CM

## 2024-05-21 NOTE — TELEPHONE ENCOUNTER
No care due was identified.  [facility] Embedded Care Due Messages. Reference number: [id number].   5/21/2024 4:18:41 PM CDT

## 2024-05-22 RX ORDER — DIAZEPAM 5 MG/1
5 TABLET ORAL NIGHTLY
Qty: 30 TABLET | Refills: 1 | Status: SHIPPED | OUTPATIENT
Start: 2024-05-22

## 2024-05-27 PROBLEM — K92.2 GIB (GASTROINTESTINAL BLEEDING): Status: RESOLVED | Noted: 2024-02-24 | Resolved: 2024-05-27

## 2024-05-27 PROBLEM — J96.01 ACUTE HYPOXIC RESPIRATORY FAILURE: Status: RESOLVED | Noted: 2024-02-26 | Resolved: 2024-05-27

## 2024-05-28 DIAGNOSIS — I10 ESSENTIAL HYPERTENSION: ICD-10-CM

## 2024-05-28 DIAGNOSIS — J44.9 CHRONIC OBSTRUCTIVE PULMONARY DISEASE, UNSPECIFIED COPD TYPE: ICD-10-CM

## 2024-05-28 RX ORDER — ALBUTEROL SULFATE 90 UG/1
AEROSOL, METERED RESPIRATORY (INHALATION)
Qty: 20.1 G | Refills: 3 | OUTPATIENT
Start: 2024-05-28

## 2024-05-28 NOTE — TELEPHONE ENCOUNTER
Refill Routing Note   Medication(s) are not appropriate for processing by [facility] for the following reason(s):        New or recently adjusted medication  Required vitals abnormal    [facility] action(s):  Defer             Appointments  past 12m or future 3m with PCP    Date Provider   Last Visit   4/10/2024 Kalani Velázquez MD   Next Visit   8/15/2024 Kalani Velázquez MD   ED visits in past 90 days: 0        Note composed:8:51 AM 05/28/2024

## 2024-05-28 NOTE — TELEPHONE ENCOUNTER
I have reviewed and agree with the assessment below.         Medication(s) are not appropriate for processing by [facility] for the following reason(s):         New or recently adjusted medication  Required vitals abnormal: BP  (!) 156/82       Unfortunately, this refill request requires further review from a provider     Thank you.

## 2024-05-28 NOTE — TELEPHONE ENCOUNTER
No care due was identified.  [facility] Embedded Care Due Messages. Reference number: [id number].   5/28/2024 8:41:31 AM CDT

## 2024-05-28 NOTE — TELEPHONE ENCOUNTER
Refill Decision Note   Alexis Medrano  is requesting a refill authorization.  Brief Assessment and Rationale for Refill:  Quick Discontinue     Medication Therapy Plan:  Reason: Stop Taking at Discharge      Comments:     Note composed:7:32 AM 05/28/2024

## 2024-05-28 NOTE — TELEPHONE ENCOUNTER
No care due was identified.  [facility] Embedded Care Due Messages. Reference number: [id number].   5/28/2024 7:18:54 AM CDT

## 2024-05-29 RX ORDER — ATENOLOL 100 MG/1
TABLET ORAL
Qty: 90 TABLET | Refills: 3 | Status: SHIPPED | OUTPATIENT
Start: 2024-05-29

## 2024-05-31 ENCOUNTER — TELEPHONE (OUTPATIENT)
Dept: FAMILY MEDICINE | Facility: CLINIC | Age: 74
End: 2024-05-31
Payer: MEDICARE

## 2024-05-31 DIAGNOSIS — R60.0 LOCALIZED EDEMA: ICD-10-CM

## 2024-05-31 RX ORDER — FUROSEMIDE 20 MG/1
20 TABLET ORAL DAILY
Qty: 90 TABLET | Refills: 3 | Status: SHIPPED | OUTPATIENT
Start: 2024-05-31

## 2024-05-31 NOTE — TELEPHONE ENCOUNTER
----- Message from Yadi Gibbs, Patient Care Assistant sent at 5/31/2024 11:41 AM CDT -----  Contact: Pt  Type:  RX Refill Request    Who Called:  Pt  Refill or New Rx:  Refill  RX Name and Strength:  Hydrocodone  mg, Furosemide  How is the patient currently taking it? (ex. 1XDay):  As Directed  Is this a 30 day or 90 day RX:  30  Preferred Pharmacy with phone number:    [id number] - [place], [place] - [facility] [address] [facility]  [address]  [facility] [address]  Phone: [phone] Fax: [phone]  Local or Mail Order:  local  Ordering Provider:  Melania Marie Call Back Number:  [phone]  Additional Information:  Please contact pt upon completion-Thank you~

## 2024-05-31 NOTE — TELEPHONE ENCOUNTER
states: No.  Can not prescribe the medication chronically, he is taking chronic benzodiazepines and I only give the last time for a short term.  Can not refill the medication.  Any severe symptoms to go to the emergency room, and make sure to follow up with the pain specialist.  If you can make him an appointment for earlier appointment, we can prescribe something different but no pain medication chronically.  Thank you.       Called pt and spoke to him, informed of above. He has appt with pain management on Monday. Reminded him of this

## 2024-06-14 ENCOUNTER — TELEPHONE (OUTPATIENT)
Dept: FAMILY MEDICINE | Facility: CLINIC | Age: 74
End: 2024-06-14
Payer: MEDICARE

## 2024-06-14 NOTE — TELEPHONE ENCOUNTER
----- Message from Ashley Wren sent at 6/14/2024  9:49 AM CDT -----  Contact: Self  Type: Needs Medical Advice  Who Called:  Patient  Symptoms (please be specific):  Pt having pain in both legs from his hip running down his thighs, also having swelling in his feet  How long has patient had these symptoms:  since before he saw Dr Velázquez   Pharmacy name and phone #:    [facility]. [address] [address]  Phone: [phone] Fax: [phone]  Best Call Back Number: [phone]  Additional Information: Pt is asking if he could be referred to someone and wanted to discuss with the nurse. Also asking if she could call in something for the pain in is legs to hold him over the weekend until he can be seen. Thank You.

## 2024-06-14 NOTE — TELEPHONE ENCOUNTER
Called pt and spoke to him, he is still having issues with legs and arms and swelling and is in a lot pain. He hasn't had to anything to drink.     Please advise, he would like something for pain for the weekend and he would like to see if there is a specialist for what he is going through.

## 2024-06-19 ENCOUNTER — TELEPHONE (OUTPATIENT)
Dept: FAMILY MEDICINE | Facility: CLINIC | Age: 74
End: 2024-06-19
Payer: MEDICARE

## 2024-06-19 NOTE — TELEPHONE ENCOUNTER
Returned patient call, per Dr. Velázquez, informed patient he must go through pain management for any pain medications. Patient VU

## 2024-06-19 NOTE — TELEPHONE ENCOUNTER
----- Message from Denisa Arce sent at 6/19/2024  8:20 AM CDT -----  Contact: self  Type:  Needs Medical Advice    Who Called: self  Symptoms (please be specific): pt is requesting something be sent in to phar for his neck,back and hip pain.    Pharmacy name and phone #:        [facility] [address] [address]  Phone: [phone] Fax: [phone]      Would the patient rather a call back or a response via [facility]? call  Best Call Back Number: [phone] (home) [phone] (work)    Additional Information: please advise and thank you.

## 2024-06-28 ENCOUNTER — TELEPHONE (OUTPATIENT)
Dept: FAMILY MEDICINE | Facility: CLINIC | Age: 74
End: 2024-06-28
Payer: MEDICARE

## 2024-06-28 NOTE — TELEPHONE ENCOUNTER
----- Message from Jhoana Kwong sent at 6/28/2024  2:01 PM CDT -----  Regarding: Needs return call  Type: Needs Medical Advice  Who Called:  Pt    Best Call Back Number: [phone] or the other number on his chart    Additional Information: Pt is needing to speak to someone regarding his medication and his cold and frequent urinating.. please call to advise

## 2024-06-28 NOTE — TELEPHONE ENCOUNTER
Spoke to pt. He thinks he has a UTI. I advised him to be seen at an Urgent Care today. Pt verbalized understanding. He will go to UC today.

## 2024-07-11 ENCOUNTER — TELEPHONE (OUTPATIENT)
Dept: PAIN MEDICINE | Facility: CLINIC | Age: 74
End: 2024-07-11
Payer: MEDICARE

## 2024-07-12 ENCOUNTER — PATIENT OUTREACH (OUTPATIENT)
Dept: ADMINISTRATIVE | Facility: CLINIC | Age: 74
End: 2024-07-12
Payer: MEDICARE

## 2024-07-12 RX ORDER — TELMISARTAN AND HYDROCHLORTHIAZIDE 80; 12.5 MG/1; MG/1
1 TABLET ORAL DAILY
COMMUNITY
Start: 2024-07-10

## 2024-07-12 RX ORDER — HYDROCODONE BITARTRATE AND ACETAMINOPHEN 10; 325 MG/1; MG/1
1 TABLET ORAL EVERY 12 HOURS PRN
COMMUNITY
Start: 2024-04-10

## 2024-07-12 RX ORDER — MIRTAZAPINE 7.5 MG/1
7.5 TABLET, FILM COATED ORAL NIGHTLY
COMMUNITY
Start: 2024-03-21

## 2024-07-12 NOTE — PROGRESS NOTES
C3 nurse spoke with Alexis Medrano Jr. for a TCC post hospital discharge follow up call. The patient has a scheduled HOSFU appointment with Beatrice Chambers, NP  Care at home on 7/17/24 @ 8:00am.

## 2024-07-17 ENCOUNTER — TELEPHONE (OUTPATIENT)
Dept: HOME HEALTH SERVICES | Facility: CLINIC | Age: 74
End: 2024-07-17

## 2024-07-17 NOTE — TELEPHONE ENCOUNTER
7/16/24 patient confirmed [facility] appt with this NP via telephone for 7/17/24 1000 am -1030 am visit.    Upon arrival to his apartment there was no answer at his door or telephone as I called him from the front door. No return call received as of 310pm. This provider stayed in the area until 11am waiting to hear back from him.

## 2024-07-27 DIAGNOSIS — R60.0 LOCALIZED EDEMA: ICD-10-CM

## 2024-07-27 NOTE — TELEPHONE ENCOUNTER
No care due was identified.  [facility] Embedded Care Due Messages. Reference number: [id number].   7/27/2024 7:01:54 AM CDT

## 2024-07-29 RX ORDER — FUROSEMIDE 20 MG/1
20 TABLET ORAL DAILY
Qty: 90 TABLET | Refills: 2 | Status: SHIPPED | OUTPATIENT
Start: 2024-07-29

## 2024-07-29 NOTE — TELEPHONE ENCOUNTER
Refill Routing Note   Medication(s) are not appropriate for processing by [facility] for the following reason(s):        Required vitals abnormal    [facility] action(s):  Defer               Appointments  past 12m or future 3m with PCP    Date Provider   Last Visit   4/10/2024 Kalani Velázquez MD   Next Visit   8/15/2024 Kalani Velázquez MD   ED visits in past 90 days: 0        Note composed:6:41 AM 07/29/2024

## 2024-08-05 DIAGNOSIS — I10 ESSENTIAL (PRIMARY) HYPERTENSION: ICD-10-CM

## 2024-08-06 RX ORDER — TELMISARTAN AND HYDROCHLORTHIAZIDE 80; 12.5 MG/1; MG/1
1 TABLET ORAL
Qty: 90 TABLET | Refills: 2 | OUTPATIENT
Start: 2024-08-06

## 2024-08-06 RX ORDER — TELMISARTAN AND HYDROCHLORTHIAZIDE 80; 12.5 MG/1; MG/1
1 TABLET ORAL DAILY
Qty: 90 TABLET | Refills: 0 | Status: SHIPPED | OUTPATIENT
Start: 2024-08-06

## 2024-08-12 ENCOUNTER — TELEPHONE (OUTPATIENT)
Dept: FAMILY MEDICINE | Facility: CLINIC | Age: 74
End: 2024-08-12
Payer: MEDICARE

## 2024-08-12 NOTE — TELEPHONE ENCOUNTER
----- Message from James Castaneda sent at 8/12/2024  4:43 PM CDT -----  Type: Needs Medical Advice  Who Called:  pt  Best Call Back Number: [phone]    Additional Information: Pt is calling the office to see if he is able to be seen tomorrow so he wont have to make two trips.Please call back and advise.

## 2024-08-12 NOTE — TELEPHONE ENCOUNTER
----- Message from Aaron Rich sent at 8/12/2024  3:05 PM CDT -----  Type:  Sooner Appointment Request  Caller is requesting a sooner appointment.  Caller declined first available appointment listed below.  Caller will not accept being placed on the waitlist and is requesting a message be sent to doctor.    Name of Caller:  pt     When is the first available appointment?  8/15    Would the patient rather a call back or a response via [facility]? Call    Best Call Back Number:  [phone]    Additional Information:  Pt just got an tj with neuro for tomorrow at 2 pm and he wanted to know if his tj for 8/15 can be scheduled sooner for the same day. He wants to get everything don deena the same day as he is not too comfortable driving. Please call back to advise, thanks!

## 2024-08-12 NOTE — TELEPHONE ENCOUNTER
Called and spoke with patient concerning neurology appointment.  He will contact them and see about getting an appointment that works with his schedule.

## 2024-08-13 ENCOUNTER — TELEPHONE (OUTPATIENT)
Dept: NEUROLOGY | Facility: CLINIC | Age: 74
End: 2024-08-13
Payer: MEDICARE

## 2024-08-13 NOTE — TELEPHONE ENCOUNTER
Returned call to patient to reschedule appointment. No answer, no voicemail. Unable to leave message

## 2024-08-13 NOTE — TELEPHONE ENCOUNTER
----- Message from Lorna Boyd sent at 8/12/2024  5:45 PM CDT -----  Type:  Sooner Apoointment Request    Caller is requesting a sooner appointment.  Caller declined first available appointment listed below.  Caller will not accept being placed on the waitlist and is requesting a message be sent to doctor.  Name of Caller: Pt  When is the first available appointment?  Symptoms: reschedule appt  Would the patient rather a call back or a response via [facility]? Call  Best Call Back Number:  [phone]  Additional Information:  Pt would like a sooner appt possibly in the morning

## 2024-08-19 DIAGNOSIS — Z98.890 S/P LUMBAR SPINE OPERATION: ICD-10-CM

## 2024-08-19 DIAGNOSIS — F41.9 ANXIETY: ICD-10-CM

## 2024-08-19 RX ORDER — DIAZEPAM 5 MG/1
5 TABLET ORAL NIGHTLY
Qty: 30 TABLET | Refills: 0 | OUTPATIENT
Start: 2024-08-19

## 2024-08-19 NOTE — TELEPHONE ENCOUNTER
No care due was identified.  [facility] Embedded Care Due Messages. Reference number: [id number].   8/19/2024 11:34:22 AM CDT

## 2024-08-26 DIAGNOSIS — J44.9 CHRONIC OBSTRUCTIVE PULMONARY DISEASE, UNSPECIFIED COPD TYPE: ICD-10-CM

## 2024-08-26 DIAGNOSIS — F41.9 ANXIETY: ICD-10-CM

## 2024-08-26 DIAGNOSIS — Z98.890 S/P LUMBAR SPINE OPERATION: ICD-10-CM

## 2024-08-26 NOTE — TELEPHONE ENCOUNTER
Refill Routing Note   Medication(s) are not appropriate for processing by [facility] for the following reason(s):      Medication outside of protocol    [facility] action(s):  Route Care Due:  None identified            Appointments  past 12m or future 3m with PCP    Date Provider   Last Visit   4/10/2024 Kalani Velázquez MD   Next Visit   9/4/2024 Kalani Velázquez MD   ED visits in past 90 days: 0        Note composed:6:59 PM 08/26/2024

## 2024-08-26 NOTE — TELEPHONE ENCOUNTER
No care due was identified.  [facility] Embedded Care Due Messages. Reference number: [id number].   8/26/2024 10:28:03 AM CDT

## 2024-08-27 RX ORDER — ALBUTEROL SULFATE 90 UG/1
INHALANT RESPIRATORY (INHALATION)
Qty: 20.1 G | Refills: 1 | Status: SHIPPED | OUTPATIENT
Start: 2024-08-27

## 2024-08-27 RX ORDER — DIAZEPAM 5 MG/1
TABLET ORAL
Qty: 30 TABLET | Refills: 1 | OUTPATIENT
Start: 2024-08-27

## 2024-09-04 DIAGNOSIS — I10 ESSENTIAL HYPERTENSION: ICD-10-CM

## 2024-09-04 DIAGNOSIS — E61.1 IRON DEFICIENCY: ICD-10-CM

## 2024-09-04 RX ORDER — ATENOLOL 100 MG/1
100 TABLET ORAL DAILY
Qty: 90 TABLET | Refills: 3 | Status: SHIPPED | OUTPATIENT
Start: 2024-09-04

## 2024-09-04 RX ORDER — FERROUS SULFATE 325(65) MG
325 TABLET ORAL EVERY OTHER DAY
Qty: 15 TABLET | Refills: 0 | Status: SHIPPED | OUTPATIENT
Start: 2024-09-04

## 2024-09-04 RX ORDER — HYDROCODONE BITARTRATE AND ACETAMINOPHEN 10; 325 MG/1; MG/1
1 TABLET ORAL EVERY 12 HOURS PRN
OUTPATIENT
Start: 2024-09-04

## 2024-09-04 NOTE — TELEPHONE ENCOUNTER
----- Message from Sonia Valdivia sent at 9/4/2024  8:25 AM CDT -----  Contact: PT  Type:  RX Refill Request    Who Called: PT   Refill or New Rx: 3 REFILL  RX Name and Strength:   1. HYDROcodone-acetaminophen (NORCO)  mg per tablet  2. atenoloL (TENORMIN) 100 MG tablet  3. ferrous sulfate (FEROSUL) 325 mg (65 mg iron) Tab tablet  Preferred Pharmacy with phone number:  [facility] [address] [address]  Phone: [phone] Fax: [phone]  Local or Mail Order:LOCAL  Ordering Provider:NOAH  Would the patient rather a call back or a response via [facility]? CALL  Best Call Back Number:[phone] (home) [phone] (work)  Additional Information: THANK YOU

## 2024-09-04 NOTE — TELEPHONE ENCOUNTER
LOV 04/10/24  NOV 09/11/24  Pt did not have transportation today that's why he had to cancel his appt.

## 2024-09-04 NOTE — TELEPHONE ENCOUNTER
No care due was identified.  [facility] Embedded Care Due Messages. Reference number: [id number].   9/04/2024 9:07:23 AM CDT

## 2024-09-05 ENCOUNTER — OFFICE VISIT (OUTPATIENT)
Dept: PRIMARY CARE CLINIC | Facility: CLINIC | Age: 74
End: 2024-09-05
Payer: MEDICARE

## 2024-09-05 VITALS
DIASTOLIC BLOOD PRESSURE: 60 MMHG | RESPIRATION RATE: 16 BRPM | HEART RATE: 69 BPM | SYSTOLIC BLOOD PRESSURE: 120 MMHG | TEMPERATURE: 98 F | BODY MASS INDEX: 25.65 KG/M2 | HEIGHT: 64 IN | WEIGHT: 150.25 LBS | OXYGEN SATURATION: 96 %

## 2024-09-05 DIAGNOSIS — R74.8 ELEVATED LIVER ENZYMES: ICD-10-CM

## 2024-09-05 DIAGNOSIS — R63.4 RECENT WEIGHT LOSS: ICD-10-CM

## 2024-09-05 DIAGNOSIS — S61.309A AVULSION OF FINGERNAIL, INITIAL ENCOUNTER: Primary | ICD-10-CM

## 2024-09-05 PROCEDURE — 3074F SYST BP LT 130 MM HG: CPT | Mod: CPTII,S$GLB,, | Performed by: PHYSICIAN ASSISTANT

## 2024-09-05 PROCEDURE — 1101F PT FALLS ASSESS-DOCD LE1/YR: CPT | Mod: CPTII,S$GLB,, | Performed by: PHYSICIAN ASSISTANT

## 2024-09-05 PROCEDURE — 1160F RVW MEDS BY RX/DR IN RCRD: CPT | Mod: CPTII,S$GLB,, | Performed by: PHYSICIAN ASSISTANT

## 2024-09-05 PROCEDURE — 1125F AMNT PAIN NOTED PAIN PRSNT: CPT | Mod: CPTII,S$GLB,, | Performed by: PHYSICIAN ASSISTANT

## 2024-09-05 PROCEDURE — 1159F MED LIST DOCD IN RCRD: CPT | Mod: CPTII,S$GLB,, | Performed by: PHYSICIAN ASSISTANT

## 2024-09-05 PROCEDURE — 3288F FALL RISK ASSESSMENT DOCD: CPT | Mod: CPTII,S$GLB,, | Performed by: PHYSICIAN ASSISTANT

## 2024-09-05 PROCEDURE — 99214 OFFICE O/P EST MOD 30 MIN: CPT | Mod: S$GLB,,, | Performed by: PHYSICIAN ASSISTANT

## 2024-09-05 PROCEDURE — 4010F ACE/ARB THERAPY RXD/TAKEN: CPT | Mod: CPTII,S$GLB,, | Performed by: PHYSICIAN ASSISTANT

## 2024-09-05 PROCEDURE — 3078F DIAST BP <80 MM HG: CPT | Mod: CPTII,S$GLB,, | Performed by: PHYSICIAN ASSISTANT

## 2024-09-05 NOTE — PROGRESS NOTES
Subjective     Patient ID: Alexis Medrano Jr. is a 74 y.o. male.    Chief Complaint: Foot Pain (Pt co sore nail on left hand.)    75 y/o M presents to clinic today for evaluation of a left broken thumb nail. Patient states he broke his nail 1 week ago after  a heavy object. Denies pain, bleeding, or fever. Patient notes concerns for recently weight loss. He reports losing 20lbs since April following the passing of both his sisters.     No other concern reported at this time.      Review of Systems   Musculoskeletal:  Negative for joint deformity.   All other systems reviewed and are negative.    Past Medical History:   Diagnosis Date    Depression     Hyperlipidemia 5/8/2019    Hypertension     Seizures        Past Surgical History:   Procedure Laterality Date    COLONOSCOPY N/A 4/28/2022    Procedure: COLONOSCOPY;  Surgeon: Jordy Zamora MD;  Location: [facility];  Service: Endoscopy;  Laterality: N/A;    CORONARY ARTERY BYPASS GRAFT      LAMINECTOMY OF THORACIC SPINE BY POSTERIOR APPROACH USING COMPUTER-ASSISTED NAVIGATION  5/29/2020    Procedure: LAMINECTOMY, SPINE, THORACIC, POSTERIOR APPROACH, USING COMPUTER-ASSISTED NAVIGATION T7-9;  Surgeon: Elmer Connell MD;  Location: [facility];  Service: Neurosurgery;;    SURGICAL REMOVAL OF VERTEBRAL BODY OF THORACIC SPINE N/A 5/29/2020    Procedure: CORPECTOMY, SPINE, THORACIC - Partial T8 - T9 TRANSPEDICULAR/ COSTOTRANSVESECTOMY  T9;  Surgeon: Elmer Connell MD;  Location: [facility];  Service: Neurosurgery;  Laterality: N/A;    THORACIC LAMINECTOMY WITH FUSION N/A 5/29/2020    Procedure: LAMINECTOMY, SPINE, THORACIC WITH FUSION- T7-10;  Surgeon: Elmer Connell MD;  Location: [facility];  Service: Neurosurgery;  Laterality: N/A;       Family History   Problem Relation Name Age of Onset    Diabetes Mother      No Known Problems Father      Cancer Sister          pt does not know what kind    Diabetes Sister      No Known Problems Sister      No Known Problems Sister          Social History     Socioeconomic History    Marital status:    Tobacco Use    Smoking status: Every Day     Current packs/day: 1.00     Average packs/day: 1 pack/day for 57.0 years (57.0 ttl pk-yrs)     Types: Cigarettes    Smokeless tobacco: Never    Tobacco comments:     Age Started: 14   Substance and Sexual Activity    Alcohol use: Yes     Comment: [facility]      Social Determinants of Health     Financial Resource Strain: Low Risk  (7/7/2024)    Received from [facility]    Overall Financial Resource Strain (CARDIA)     Difficulty of Paying Living Expenses: Not hard at all   Food Insecurity: No Food Insecurity (7/7/2024)    Received from [facility]    Hunger Vital Sign     Worried About Running Out of Food in the Last Year: Never true     Ran Out of Food in the Last Year: Never true   Transportation Needs: No Transportation Needs (7/7/2024)    Received from [facility]    PRAPARE - Transportation     Lack of Transportation (Medical): No     Lack of Transportation (Non-Medical): No   Housing Stability: Low Risk  (2/28/2024)    Housing Stability Vital Sign     Unable to Pay for Housing in the Last Year: No     Number of Places Lived in the Last Year: 1     Unstable Housing in the Last Year: No       Review of patient's allergies indicates:  No Known Allergies      Current Outpatient Medications:     albuterol (PROVENTIL/VENTOLIN HFA) 90 mcg/actuation inhaler, INHALE TWO PUFFS BY MOUTH EVERY 6 HOURS AS NEEDED FOR WHEEZING OR RESCUE (BULK), Disp: 20.1 g, Rfl: 1    albuterol-ipratropium (DUO-NEB) 2.5 mg-0.5 mg/3 mL nebulizer solution, Take 3 mLs by nebulization every 4 (four) hours as needed for Wheezing. Rescue, Disp: 75 mL, Rfl: 0    aspirin (ECOTRIN) 81 MG EC tablet, Take 81 mg by  mouth once daily., Disp: , Rfl:     atenoloL (TENORMIN) 100 MG tablet, Take 1 tablet (100 mg total) by mouth once daily., Disp: 90 tablet, Rfl: 3    brivaracetam (BRIVIACT) 10 mg/mL Soln, Take 10 mLs (100 mg total) by mouth 2 (two) times daily., Disp: 600 mL, Rfl: 2    diazePAM (VALIUM) 5 MG tablet, TAKE ONE TABLET BY MOUTH EVERY EVENING, Disp: 30 tablet, Rfl: 1    EScitalopram oxalate (LEXAPRO) 20 MG tablet, Take 1 tablet (20 mg total) by mouth every evening., Disp: 90 tablet, Rfl: 3    ferrous sulfate (FEROSUL) 325 mg (65 mg iron) Tab tablet, Take 1 tablet (325 mg total) by mouth every other day. TAKE ONE TABLET BY MOUTH ONCE DAILY, Disp: 15 tablet, Rfl: 0    finasteride (PROSCAR) 5 mg tablet, TAKE ONE TABLET BY MOUTH ONCE DAILY, Disp: 90 tablet, Rfl: 3    fluticasone propionate (FLONASE) 50 mcg/actuation nasal spray, SHAKE LIQUID AND USE 1 SPRAY(50 MCG) IN EACH NOSTRIL EVERY DAY, Disp: 48 g, Rfl: 0    furosemide (LASIX) 20 MG tablet, Take 1 tablet (20 mg total) by mouth once daily., Disp: 90 tablet, Rfl: 2    HYDROcodone-acetaminophen (NORCO)  mg per tablet, Take 1 tablet by mouth every 12 (twelve) hours as needed., Disp: , Rfl:     lacosamide (VIMPAT) 10 mg/mL Soln oral solution, Take 20 mLs (200 mg total) by mouth every 12 (twelve) hours., Disp: 1200 mL, Rfl: 11    latanoprost 0.005 % ophthalmic solution, Place 1 drop into both eyes every evening., Disp: 2.5 mL, Rfl: 3    mirtazapine (REMERON) 7.5 MG Tab, Take 7.5 mg by mouth every evening., Disp: , Rfl:     montelukast (SINGULAIR) 10 mg tablet, Take 1 tablet (10 mg total) by mouth every evening., Disp: 90 tablet, Rfl: 3    multivitamin (THERAGRAN) per tablet, Take 1 tablet by mouth once daily., Disp: , Rfl:     pantoprazole (PROTONIX) 40 MG tablet, Take 1 tablet (40 mg total) by mouth once daily., Disp: 30 tablet, Rfl: 11    potassium chloride SA (K-DUR,KLOR-CON M) 10 MEQ tablet, Take 1 tablet (10 mEq total) by mouth once daily., Disp: 30 tablet, Rfl:  "3    rosuvastatin (CRESTOR) 40 MG Tab, TAKE ONE TABLET BY MOUTH EVERY EVENING (VIAL), Disp: 90 tablet, Rfl: 3    telmisartan-hydrochlorothiazide (MICARDIS HCT) 80-12.5 mg per tablet, Take 1 tablet by mouth once daily., Disp: 90 tablet, Rfl: 0    thiamine 100 MG tablet, Take 1 tablet (100 mg total) by mouth once daily., Disp: 30 tablet, Rfl: 3    /60   Pulse 69   Temp 98.3 °F (36.8 °C)   Resp 16   Ht 5' 4" (1.626 m)   Wt 68.2 kg (150 lb 3.9 oz)   SpO2 96%   BMI 25.79 kg/m²        Objective     Physical Exam  Vitals and nursing note reviewed.   Constitutional:       Appearance: Normal appearance.   HENT:      Head: Normocephalic and atraumatic.   Cardiovascular:      Rate and Rhythm: Normal rate and regular rhythm.   Pulmonary:      Effort: Pulmonary effort is normal.      Breath sounds: Normal breath sounds.   Abdominal:      General: Abdomen is flat.      Palpations: Abdomen is soft.   Musculoskeletal:         General: Normal range of motion.      Comments: Deformity noted to the medial aspect of the left nail bed. Appears to be elevated. Partial avulsion. New nail formation.    Skin:     General: Skin is warm.      Capillary Refill: Capillary refill takes less than 2 seconds.   Neurological:      General: No focal deficit present.      Mental Status: He is alert.   Psychiatric:         Mood and Affect: Mood normal.         Behavior: Behavior normal.         Thought Content: Thought content normal.         Judgment: Judgment normal.            Assessment and Plan     1. Avulsion of fingernail, initial encounter  -Advised to keep the nailbed clean and to trim the nail as the new nail bed grows out  2. Recent weight loss  -advised to f/u with Dr. Velázquez   3. Elevated liver enzymes  -Patient decline referral with hepatologist at this time.        I spent 30 minutes on this encounter, time includes face-to-face, chart review, documentation, test review and orders.      Ras Hunt " LG

## 2024-09-10 ENCOUNTER — TELEPHONE (OUTPATIENT)
Dept: FAMILY MEDICINE | Facility: CLINIC | Age: 74
End: 2024-09-10
Payer: MEDICARE

## 2024-09-10 NOTE — TELEPHONE ENCOUNTER
----- Message from Jennifer Cotton sent at 9/10/2024  1:32 PM CDT -----  Pt is requesting refill on his [facility]      [id number] - [place] - [address] AT [facility] & [address]  [address]  [place] [address]  Phone: [phone] Fax: [phone]    Please call to advise if/when is sent in

## 2024-09-20 ENCOUNTER — TELEPHONE (OUTPATIENT)
Dept: FAMILY MEDICINE | Facility: CLINIC | Age: 74
End: 2024-09-20
Payer: MEDICARE

## 2024-09-20 NOTE — TELEPHONE ENCOUNTER
----- Message from Ernestina Wright sent at 9/20/2024  7:08 AM CDT -----  Contact: Self  Type:  RX Refill Request    Who Called:  Self  Refill or New Rx:  Refill  RX Name and Strength:  diazePAM (VALIUM) 5 MG tablet 30 tablet 1 5/22/2024 -  Sig - Route: TAKE ONE TABLET BY MOUTH EVERY EVENING - Oral   Preferred Pharmacy with phone number:    [facility], [address] [address]  Phone: [phone] Fax: [phone]  Local or Mail Order:  Local  Ordering Provider:  Melania Marie Call Back Number:  [phone]  Additional Information:  Please call the patient back at the phone number listed above to advise. Thank you!

## 2024-09-23 ENCOUNTER — OFFICE VISIT (OUTPATIENT)
Dept: PRIMARY CARE CLINIC | Facility: CLINIC | Age: 74
End: 2024-09-23
Payer: MEDICARE

## 2024-09-23 VITALS
OXYGEN SATURATION: 96 % | SYSTOLIC BLOOD PRESSURE: 102 MMHG | BODY MASS INDEX: 26.75 KG/M2 | RESPIRATION RATE: 18 BRPM | WEIGHT: 155.88 LBS | DIASTOLIC BLOOD PRESSURE: 60 MMHG | HEART RATE: 60 BPM

## 2024-09-23 DIAGNOSIS — Z98.890 S/P LUMBAR SPINE OPERATION: ICD-10-CM

## 2024-09-23 DIAGNOSIS — N39.45 CONTINUOUS LEAKAGE OF URINE: ICD-10-CM

## 2024-09-23 DIAGNOSIS — Z23 NEED FOR SHINGLES VACCINE: ICD-10-CM

## 2024-09-23 DIAGNOSIS — F41.9 ANXIETY: ICD-10-CM

## 2024-09-23 DIAGNOSIS — Z12.5 SCREENING FOR MALIGNANT NEOPLASM OF PROSTATE: ICD-10-CM

## 2024-09-23 DIAGNOSIS — Z23 NEED FOR VACCINATION: ICD-10-CM

## 2024-09-23 DIAGNOSIS — M54.30 SCIATICA, UNSPECIFIED LATERALITY: Primary | ICD-10-CM

## 2024-09-23 LAB
BILIRUBIN, UA POC OHS: NEGATIVE
BLOOD, UA POC OHS: ABNORMAL
CLARITY, UA POC OHS: CLEAR
COLOR, UA POC OHS: YELLOW
COMPLEXED PSA SERPL-MCNC: 0.05 NG/ML (ref 0–4)
GLUCOSE, UA POC OHS: NEGATIVE
KETONES, UA POC OHS: NEGATIVE
LEUKOCYTES, UA POC OHS: NEGATIVE
NITRITE, UA POC OHS: NEGATIVE
PH, UA POC OHS: 5.5
PROTEIN, UA POC OHS: ABNORMAL
SPECIFIC GRAVITY, UA POC OHS: 1.01
UROBILINOGEN, UA POC OHS: 0.2

## 2024-09-23 PROCEDURE — 3074F SYST BP LT 130 MM HG: CPT | Mod: CPTII,S$GLB,, | Performed by: PHYSICIAN ASSISTANT

## 2024-09-23 PROCEDURE — 90750 HZV VACC RECOMBINANT IM: CPT | Mod: S$GLB,,, | Performed by: PHYSICIAN ASSISTANT

## 2024-09-23 PROCEDURE — 1159F MED LIST DOCD IN RCRD: CPT | Mod: CPTII,S$GLB,, | Performed by: PHYSICIAN ASSISTANT

## 2024-09-23 PROCEDURE — G0008 ADMIN INFLUENZA VIRUS VAC: HCPCS | Mod: S$GLB,,, | Performed by: PHYSICIAN ASSISTANT

## 2024-09-23 PROCEDURE — 99214 OFFICE O/P EST MOD 30 MIN: CPT | Mod: 25,S$GLB,, | Performed by: PHYSICIAN ASSISTANT

## 2024-09-23 PROCEDURE — 1125F AMNT PAIN NOTED PAIN PRSNT: CPT | Mod: CPTII,S$GLB,, | Performed by: PHYSICIAN ASSISTANT

## 2024-09-23 PROCEDURE — 3288F FALL RISK ASSESSMENT DOCD: CPT | Mod: CPTII,S$GLB,, | Performed by: PHYSICIAN ASSISTANT

## 2024-09-23 PROCEDURE — 84153 ASSAY OF PSA TOTAL: CPT | Performed by: PHYSICIAN ASSISTANT

## 2024-09-23 PROCEDURE — 1160F RVW MEDS BY RX/DR IN RCRD: CPT | Mod: CPTII,S$GLB,, | Performed by: PHYSICIAN ASSISTANT

## 2024-09-23 PROCEDURE — 81003 URINALYSIS AUTO W/O SCOPE: CPT | Mod: QW,S$GLB,, | Performed by: PHYSICIAN ASSISTANT

## 2024-09-23 PROCEDURE — 1101F PT FALLS ASSESS-DOCD LE1/YR: CPT | Mod: CPTII,S$GLB,, | Performed by: PHYSICIAN ASSISTANT

## 2024-09-23 PROCEDURE — 90653 IIV ADJUVANT VACCINE IM: CPT | Mod: S$GLB,,, | Performed by: PHYSICIAN ASSISTANT

## 2024-09-23 PROCEDURE — 87086 URINE CULTURE/COLONY COUNT: CPT | Performed by: PHYSICIAN ASSISTANT

## 2024-09-23 PROCEDURE — 36415 COLL VENOUS BLD VENIPUNCTURE: CPT | Mod: S$GLB,,, | Performed by: PHYSICIAN ASSISTANT

## 2024-09-23 PROCEDURE — 90472 IMMUNIZATION ADMIN EACH ADD: CPT | Mod: S$GLB,,, | Performed by: PHYSICIAN ASSISTANT

## 2024-09-23 PROCEDURE — 4010F ACE/ARB THERAPY RXD/TAKEN: CPT | Mod: CPTII,S$GLB,, | Performed by: PHYSICIAN ASSISTANT

## 2024-09-23 PROCEDURE — 3078F DIAST BP <80 MM HG: CPT | Mod: CPTII,S$GLB,, | Performed by: PHYSICIAN ASSISTANT

## 2024-09-23 RX ORDER — ZOSTER VACCINE RECOMBINANT, ADJUVANTED 50 MCG/0.5
0.5 KIT INTRAMUSCULAR ONCE
Qty: 1 EACH | Refills: 0 | Status: SHIPPED | OUTPATIENT
Start: 2024-09-23 | End: 2024-09-23

## 2024-09-23 RX ORDER — SILDENAFIL 100 MG/1
100 TABLET, FILM COATED ORAL DAILY PRN
COMMUNITY

## 2024-09-23 RX ORDER — HYDROCODONE BITARTRATE AND ACETAMINOPHEN 10; 325 MG/1; MG/1
1 TABLET ORAL EVERY 6 HOURS PRN
Qty: 28 TABLET | Refills: 0 | Status: SHIPPED | OUTPATIENT
Start: 2024-09-23 | End: 2024-09-30

## 2024-09-23 RX ORDER — DIAZEPAM 5 MG/1
5 TABLET ORAL NIGHTLY
Qty: 30 TABLET | Refills: 2 | Status: SHIPPED | OUTPATIENT
Start: 2024-09-23

## 2024-09-23 NOTE — PROGRESS NOTES
Subjective     Patient ID: Alexis Medrano Jr. is a 74 y.o. male.    Chief Complaint: Medication Refill    Alexis is a 75 y/o M with a hx of thoracic spine fusion, HTN, HLD, Depression, and Seizures who present to clinic for medication refill and back pain. Patient states his back pain has been more bothersome in the past couple weeks. He reports associated pain radiating down to the BL extremities with numbness and tingling. He denies any recent injuries or falls. Denies relief with previous trial of gabapentin and physical therapy.    Patient reports he has had trouble urinating recently. Denies dysuria, hematuria, abd pain, and incontinence.     Patient is followed by hepatology.    No other concerns reported at this time.      Medication Refill  Associated symptoms include numbness. Pertinent negatives include no chills, coughing or fever.     Review of Systems   Constitutional:  Negative for chills and fever.   Respiratory:  Negative for cough and shortness of breath.    Genitourinary:  Positive for difficulty urinating. Negative for bladder incontinence.   Musculoskeletal:  Positive for back pain and leg pain.   Neurological:  Positive for numbness.   All other systems reviewed and are negative.    Past Medical History:   Diagnosis Date    Depression     Hyperlipidemia 5/8/2019    Hypertension     Seizures        Past Surgical History:   Procedure Laterality Date    COLONOSCOPY N/A 4/28/2022    Procedure: COLONOSCOPY;  Surgeon: Jordy Zamora MD;  Location: [facility];  Service: Endoscopy;  Laterality: N/A;    CORONARY ARTERY BYPASS GRAFT      LAMINECTOMY OF THORACIC SPINE BY POSTERIOR APPROACH USING COMPUTER-ASSISTED NAVIGATION  5/29/2020    Procedure: LAMINECTOMY, SPINE, THORACIC, POSTERIOR APPROACH, USING COMPUTER-ASSISTED NAVIGATION T7-9;  Surgeon: Elmer Connell MD;  Location: [facility] OR;  Service: Neurosurgery;;    SURGICAL REMOVAL OF VERTEBRAL BODY OF THORACIC SPINE N/A 5/29/2020    Procedure: CORPECTOMY,  SPINE, THORACIC - Partial T8 - T9 TRANSPEDICULAR/ COSTOTRANSVESECTOMY  T9;  Surgeon: Elmer Connell MD;  Location: [facility] OR;  Service: Neurosurgery;  Laterality: N/A;    THORACIC LAMINECTOMY WITH FUSION N/A 5/29/2020    Procedure: LAMINECTOMY, SPINE, THORACIC WITH FUSION- T7-10;  Surgeon: Elmer Connell MD;  Location: [facility] OR;  Service: Neurosurgery;  Laterality: N/A;       Family History   Problem Relation Name Age of Onset    Diabetes Mother      No Known Problems Father      Cancer Sister          pt does not know what kind    Diabetes Sister      No Known Problems Sister      No Known Problems Sister         Social History     Socioeconomic History    Marital status:    Tobacco Use    Smoking status: Every Day     Current packs/day: 1.00     Average packs/day: 1 pack/day for 57.0 years (57.0 ttl pk-yrs)     Types: Cigarettes    Smokeless tobacco: Never    Tobacco comments:     Age Started: 14   Substance and Sexual Activity    Alcohol use: Yes     Comment: ocassional      Social Determinants of Health     Financial Resource Strain: Low Risk  (7/7/2024)    [facility] Its Subsidiaries and Affiliates    Overall Financial Resource Strain (CARDIA)     Difficulty of Paying Living Expenses: Not hard at all   Food Insecurity: No Food Insecurity (7/7/2024)    [facility] from [facility] and Its Subsidiaries and Affiliates    Hunger Vital Sign     Worried About Running Out of Food in the Last Year: Never true     Ran Out of Food in the Last Year: Never true   Transportation Needs: No Transportation Needs (7/7/2024)    Received from [facility] Subsidiaries and Affiliates    PRAPARE - Transportation     Lack of Transportation (Medical): No     Lack of Transportation (Non-Medical): No   Housing Stability: Low Risk  (2/28/2024)    Housing Stability Vital Sign     Unable to Pay for  Housing in the Last Year: No     Number of Places Lived in the Last Year: 1     Unstable Housing in the Last Year: No       Review of patient's allergies indicates:  No Known Allergies      Current Outpatient Medications:     albuterol (PROVENTIL/VENTOLIN HFA) 90 mcg/actuation inhaler, INHALE TWO PUFFS BY MOUTH EVERY 6 HOURS AS NEEDED FOR WHEEZING OR RESCUE (BULK), Disp: 20.1 g, Rfl: 1    albuterol-ipratropium (DUO-NEB) 2.5 mg-0.5 mg/3 mL nebulizer solution, Take 3 mLs by nebulization every 4 (four) hours as needed for Wheezing. Rescue, Disp: 75 mL, Rfl: 0    aspirin (ECOTRIN) 81 MG EC tablet, Take 81 mg by mouth once daily., Disp: , Rfl:     atenoloL (TENORMIN) 100 MG tablet, Take 1 tablet (100 mg total) by mouth once daily., Disp: 90 tablet, Rfl: 3    EScitalopram oxalate (LEXAPRO) 20 MG tablet, Take 1 tablet (20 mg total) by mouth every evening., Disp: 90 tablet, Rfl: 3    ferrous sulfate (FEROSUL) 325 mg (65 mg iron) Tab tablet, Take 1 tablet (325 mg total) by mouth every other day. TAKE ONE TABLET BY MOUTH ONCE DAILY, Disp: 15 tablet, Rfl: 0    finasteride (PROSCAR) 5 mg tablet, TAKE ONE TABLET BY MOUTH ONCE DAILY, Disp: 90 tablet, Rfl: 3    fluticasone propionate (FLONASE) 50 mcg/actuation nasal spray, SHAKE LIQUID AND USE 1 SPRAY(50 MCG) IN EACH NOSTRIL EVERY DAY, Disp: 48 g, Rfl: 0    furosemide (LASIX) 20 MG tablet, Take 1 tablet (20 mg total) by mouth once daily., Disp: 90 tablet, Rfl: 2    lacosamide (VIMPAT) 10 mg/mL Soln oral solution, Take 20 mLs (200 mg total) by mouth every 12 (twelve) hours., Disp: 1200 mL, Rfl: 11    latanoprost 0.005 % ophthalmic solution, Place 1 drop into both eyes every evening., Disp: 2.5 mL, Rfl: 3    linaCLOtide (LINZESS) 145 mcg Cap capsule, Take 145 mcg by mouth before breakfast., Disp: , Rfl:     mirtazapine (REMERON) 7.5 MG Tab, Take 7.5 mg by mouth every evening., Disp: , Rfl:     montelukast (SINGULAIR) 10 mg tablet, Take 1 tablet (10 mg total) by mouth every evening.,  Disp: 90 tablet, Rfl: 3    multivitamin (THERAGRAN) per tablet, Take 1 tablet by mouth once daily., Disp: , Rfl:     potassium chloride SA (K-DUR,KLOR-CON M) 10 MEQ tablet, Take 1 tablet (10 mEq total) by mouth once daily., Disp: 30 tablet, Rfl: 3    rosuvastatin (CRESTOR) 40 MG Tab, TAKE ONE TABLET BY MOUTH EVERY EVENING (VIAL), Disp: 90 tablet, Rfl: 3    sildenafiL (VIAGRA) 100 MG tablet, Take 100 mg by mouth daily as needed for Erectile Dysfunction., Disp: , Rfl:     telmisartan-hydrochlorothiazide (MICARDIS HCT) 80-12.5 mg per tablet, Take 1 tablet by mouth once daily., Disp: 90 tablet, Rfl: 0    thiamine 100 MG tablet, Take 1 tablet (100 mg total) by mouth once daily., Disp: 30 tablet, Rfl: 3    brivaracetam (BRIVIACT) 10 mg/mL Soln, Take 10 mLs (100 mg total) by mouth 2 (two) times daily. (Patient not taking: Reported on 9/23/2024), Disp: 600 mL, Rfl: 2    diazePAM (VALIUM) 5 MG tablet, Take 1 tablet (5 mg total) by mouth every evening., Disp: 30 tablet, Rfl: 2    HYDROcodone-acetaminophen (NORCO)  mg per tablet, Take 1 tablet by mouth every 6 (six) hours as needed for Pain (sciatica)., Disp: 28 tablet, Rfl: 0    pantoprazole (PROTONIX) 40 MG tablet, Take 1 tablet (40 mg total) by mouth once daily. (Patient not taking: Reported on 9/23/2024), Disp: 30 tablet, Rfl: 11    /60 (BP Location: Left arm, Patient Position: Sitting, BP Method: Large (Manual))   Pulse 60   Resp 18   Wt 70.7 kg (155 lb 13.8 oz)   SpO2 96%   BMI 26.75 kg/m²        Objective     Physical Exam  Vitals and nursing note reviewed.   Constitutional:       Appearance: Normal appearance.   HENT:      Head: Normocephalic and atraumatic.   Eyes:      Conjunctiva/sclera: Conjunctivae normal.   Cardiovascular:      Rate and Rhythm: Normal rate and regular rhythm.      Pulses: Normal pulses.      Heart sounds: Normal heart sounds.   Pulmonary:      Effort: Pulmonary effort is normal.      Breath sounds: Normal breath sounds.    Abdominal:      Palpations: Abdomen is soft.   Musculoskeletal:         General: Normal range of motion.      Cervical back: Normal range of motion and neck supple.   Skin:     General: Skin is warm.      Capillary Refill: Capillary refill takes less than 2 seconds.   Neurological:      General: No focal deficit present.      Mental Status: He is alert and oriented to person, place, and time. Mental status is at baseline.   Psychiatric:         Mood and Affect: Mood normal.         Behavior: Behavior normal.         Thought Content: Thought content normal.         Judgment: Judgment normal.            Assessment and Plan     1. Sciatica, unspecified laterality  -     HYDROcodone-acetaminophen (NORCO)  mg per tablet; Take 1 tablet by mouth every 6 (six) hours as needed for Pain (sciatica).  Dispense: 28 tablet; Refill: 0  -     Ambulatory referral/consult to [facility]; Future; Expected date: 09/30/2024    2. Continuous leakage of urine  -     POCT Urinalysis(Instrument)  -     CULTURE, URINE  -     Ambulatory referral/consult to Urology; Future; Expected date: 09/30/2024    3. Screening for malignant neoplasm of prostate  -     PSA, Screening; Future; Expected date: 09/23/2024    4. Anxiety  -     diazePAM (VALIUM) 5 MG tablet; Take 1 tablet (5 mg total) by mouth every evening.  Dispense: 30 tablet; Refill: 2    5. S/P lumbar spine operation  -     diazePAM (VALIUM) 5 MG tablet; Take 1 tablet (5 mg total) by mouth every evening.  Dispense: 30 tablet; Refill: 2    6. Need for shingles vaccine        I spent 30 minutes on this encounter, time includes face-to-face, chart review, documentation, test review and orders.        Ras Hunt PA-C

## 2024-09-24 LAB — BACTERIA UR CULT: NO GROWTH

## 2024-09-25 ENCOUNTER — TELEPHONE (OUTPATIENT)
Dept: PRIMARY CARE CLINIC | Facility: CLINIC | Age: 74
End: 2024-09-25
Payer: MEDICARE

## 2024-09-25 NOTE — TELEPHONE ENCOUNTER
----- Message from Zay Hunt III, PA-C sent at 9/24/2024  7:00 AM CDT -----  Alexis Medrano Jr.    The screening test for prostate cancer is negative.

## 2024-09-30 NOTE — TELEPHONE ENCOUNTER
----- Message from Julian sent at 9/30/2024 11:03 AM CDT -----  Contact: Self  Type:  RX Refill Request    Who Called:  Patient  Refill or New Rx:  Refill  RX Name and Strength:  brivaracetam (BRIVIACT) 10 mg/mL Soln    How is the patient currently taking it? (ex. 1XDay):  As Prescribed  Is this a 30 day or 90 day RX:  90    Preferred Pharmacy with phone number:    [facility] [address]  Phone: [phone] Fax: [phone]    Local or Mail Order:  Local  Ordering Provider:  Melania Marie Call Back Number:  [phone]  Additional Information:  Patient is requesting a refill

## 2024-09-30 NOTE — TELEPHONE ENCOUNTER
----- Message from Jaqui sent at 9/30/2024 10:33 AM CDT -----  Type:  RX Refill Request    Who Called:  Pt    Refill or New Rx:  Refill    RX Name and Strength:  brivaracetam (BRIVIACT) 10 mg/mL Soln    How is the patient currently taking it? (ex. 1XDay):  as directed    Is this a 30 day or 90 day RX:  90    Preferred Pharmacy with phone number:    [facility] [place] [address]  Phone: [phone] Fax: [phone]    Local or Mail Order:  Local    Ordering Provider:  Dr Velázquez    Would the patient rather a call back or a response via [facility]?  Call back    Best Call Back Number:  [phone]    Additional Information:  Pt is needing a refill. He is almost out.  Please call back to advise. Thanks!

## 2024-10-14 RX ORDER — FINASTERIDE 5 MG/1
5 TABLET, FILM COATED ORAL DAILY
Qty: 90 TABLET | Refills: 1 | Status: SHIPPED | OUTPATIENT
Start: 2024-10-14

## 2024-10-14 NOTE — TELEPHONE ENCOUNTER
No care due was identified.  Health [facility] Embedded Care Due Messages. Reference number: [id number].   10/14/2024 7:24:43 AM CDT

## 2024-10-14 NOTE — TELEPHONE ENCOUNTER
Refill Decision Note   Alexis Mitchell  is requesting a refill authorization.  Brief Assessment and Rationale for Refill:  Approve     Medication Therapy Plan:         Comments:     Note composed:1:48 PM 10/14/2024

## 2024-10-25 DIAGNOSIS — I25.708 CORONARY ARTERY DISEASE OF BYPASS GRAFT OF NATIVE HEART WITH STABLE ANGINA PECTORIS: ICD-10-CM

## 2024-10-25 DIAGNOSIS — E78.5 DYSLIPIDEMIA: ICD-10-CM

## 2024-10-26 DIAGNOSIS — E61.1 IRON DEFICIENCY: ICD-10-CM

## 2024-10-27 RX ORDER — ROSUVASTATIN CALCIUM 40 MG/1
40 TABLET, COATED ORAL NIGHTLY
Qty: 90 TABLET | Refills: 1 | Status: SHIPPED | OUTPATIENT
Start: 2024-10-27

## 2024-10-29 RX ORDER — FERROUS SULFATE 325(65) MG
325 TABLET ORAL DAILY
Qty: 90 TABLET | Refills: 1 | Status: SHIPPED | OUTPATIENT
Start: 2024-10-29

## 2024-11-02 NOTE — TELEPHONE ENCOUNTER
----- Message from Gerri Bowen sent at 4/13/2020  4:53 PM CDT -----    Type:  Patient Returning Call    Who Called: pt  Who Left Message for Patient:  nurse  Does the patient know what this is regarding?:    About  Back issues  Best Call Back Number: [phone]  Additional Information:  Please  Call  For all details     
Patient called c/o back pain, he states he was seen in urgent care on yesterday and was sent home w/o treatment or relief, patient was offered/ and agreed to an audio visit on today with provider.   
show

## 2024-11-11 RX ORDER — SILDENAFIL 100 MG/1
100 TABLET, FILM COATED ORAL DAILY PRN
Qty: 9 TABLET | Refills: 1 | Status: SHIPPED | OUTPATIENT
Start: 2024-11-11

## 2024-11-11 NOTE — TELEPHONE ENCOUNTER
No care due was identified.  Health [facility] Embedded Care Due Messages. Reference number: [id number].   11/11/2024 10:07:11 AM CST

## 2024-11-12 NOTE — TELEPHONE ENCOUNTER
No care due was identified.  [facility] Embedded Care Due Messages. Reference number: [id number].   11/12/2024 4:36:44 PM CST

## 2024-11-12 NOTE — TELEPHONE ENCOUNTER
----- Message from Jenny sent at 11/12/2024  4:24 PM CST -----  Type:  RX Refill Request    Who Called:  Parveen with [facility]  Refill or New Rx:  refill  RX Name and Strength:  telmisartan-hydrochlorothiazide (MICARDIS HCT) 80-12.5 mg per tablet  How is the patient currently taking it? (ex. 1XDay):  as directed  Is this a 30 day or 90 day RX:  90  Preferred Pharmacy with phone number:    [facility] [place], [place] - [address] [place] [address]  Phone: [phone] Fax: [phone]  Best Call Back Number:  [phone] or [phone], direct line to Parveen  Additional Information:  Parveen is calling in regards to getting the pt's Rx filled Please call back and advise. Thanks!

## 2024-11-13 RX ORDER — TELMISARTAN AND HYDROCHLORTHIAZIDE 80; 12.5 MG/1; MG/1
1 TABLET ORAL DAILY
Qty: 90 TABLET | Refills: 0 | Status: SHIPPED | OUTPATIENT
Start: 2024-11-13

## 2024-12-19 ENCOUNTER — TELEPHONE (OUTPATIENT)
Dept: FAMILY MEDICINE | Facility: CLINIC | Age: 74
End: 2024-12-19
Payer: MEDICARE

## 2024-12-19 NOTE — TELEPHONE ENCOUNTER
Tried to call pt. No answer. Left message to return call   -pt needs to be lynn. DIOGENES Ambrocio is not in clinic today   -pt does not have a portal

## 2025-01-23 NOTE — TELEPHONE ENCOUNTER
----- Message from Mila Boyd sent at 3/17/2020  7:15 AM CDT -----  Type:  Patient Returning Call    Who Called:  Patient  Who Left Message for Patient:  NA  Does the patient know what this is regarding?:  Test Results  Best Call Back Number:  [phone] or [phone] (cellphone)  Additional Information:    
spoke to pt, reviewed labs, pt verbalized understanding and no further questions  
continue ARB and BB  ue this admission as opportunity to discuss with her pcp and cards to transition to entresto and add SGLT2i

## 2025-01-24 RX ORDER — TELMISARTAN AND HYDROCHLORTHIAZIDE 80; 12.5 MG/1; MG/1
1 TABLET ORAL
Qty: 90 TABLET | Refills: 0 | Status: SHIPPED | OUTPATIENT
Start: 2025-01-24

## 2025-01-24 NOTE — TELEPHONE ENCOUNTER
No care due was identified.  Health [facility] Embedded Care Due Messages. Reference number: [id number].   1/24/2025 7:04:10 AM CST

## 2025-01-24 NOTE — TELEPHONE ENCOUNTER
Refill Decision Note   Alexis Medrano  is requesting a refill authorization.  Brief Assessment and Rationale for Refill:  Approve     Medication Therapy Plan:        Comments:     Note composed:11:01 AM 01/24/2025

## 2025-03-06 ENCOUNTER — TELEPHONE (OUTPATIENT)
Dept: NEUROLOGY | Facility: CLINIC | Age: 75
End: 2025-03-06
Payer: MEDICARE

## 2025-03-06 NOTE — TELEPHONE ENCOUNTER
Spoke with patient and informed we are out of network with his [facility] dual and will need to call [facility] to find out who is in network. Patient voiced understanding.

## 2025-03-06 NOTE — TELEPHONE ENCOUNTER
----- Message from Mervin sent at 3/6/2025 11:51 AM CST -----  Regarding: referral  Contact: patient  Type:  Patient Returning CallWho Called:patientWho Left Message for Patient:nurseDoes the patient know what this is regarding?:patient is stating he has a referral/ appointment was made in January but No Show/ patient is now requesting to be seen for active seizuresWould the patient rather a call back or a response via [facility]? Please call to advise/scheduleBest Call Back Number:[phone] Information:

## 2025-03-14 NOTE — TELEPHONE ENCOUNTER
"Reason for Disposition   [1] MODERATE pain (e.g., interferes with normal activities, limping) AND [2] present > 3 days    Additional Information   Negative: Looks like a broken bone or dislocated joint (e.g., crooked or deformed)   Negative: Sounds like a life-threatening emergency to the triager   Negative: [1] SEVERE pain (e.g., excruciating, unable to do any normal activities) AND [2] fever   Negative: Can't stand (bear weight) or walk   Negative: [1] Red area or streak AND [2] fever   Negative: Patient sounds very sick or weak to the triager   Negative: [1] SEVERE pain (e.g., excruciating, unable to do any normal activities) AND [2] not improved after 2 hours of pain medicine   Negative: [1] Looks infected (spreading redness, pus) AND [2] large red area (> 2 in. or 5 cm)   Negative: [1] Painful rash AND [2] multiple small blisters grouped together (i.e., dermatomal distribution or "band" or "stripe")   Negative: Looks like a boil, infected sore, or deep ulcer   Negative: [1] Localized rash is very painful AND [2] no fever   Negative: [1] Redness of the skin AND [2] no fever   Negative: Numbness in a leg or foot (i.e., loss of sensation)    Protocols used: Hip Pain-A-  Pt is complaining of non traumatic bilateral hip pain. 9/10. Able to ambulate with walker but has pain. Pt has some swelling in the ankles but pain is higher in the hips. Pain has been about 2 months but worsening. Pt asking for hydrocodone. Care advice to be seen within 3 days, pt has appointment scheduled. Pt verbalized understanding.   " Patient is stable  and will continue present plan of care and reassess at next routine visit. All questions about this problem from patient were answered today.

## 2025-03-27 ENCOUNTER — TELEPHONE (OUTPATIENT)
Dept: FAMILY MEDICINE | Facility: CLINIC | Age: 75
End: 2025-03-27
Payer: MEDICARE

## 2025-03-27 NOTE — TELEPHONE ENCOUNTER
----- Message from Anabel sent at 3/27/2025  2:56 PM CDT -----  Type: Needs Medical AdviceWho Called:  Raúl Call Back Number:pharmacy  [phone] Additional Information: requesting call back in regards to checking status of pt refill for telmisartan-hydrochlorothiazide (MICARDIS HCT) 80-12.5 mg per tablet please advise

## 2025-03-28 RX ORDER — TELMISARTAN AND HYDROCHLOROTHIAZIDE 12.5; 8 MG/1; MG/1
1 TABLET ORAL DAILY
Qty: 90 TABLET | Refills: 0 | Status: SHIPPED | OUTPATIENT
Start: 2025-03-28 | End: 2025-04-25 | Stop reason: SDUPTHER

## 2025-03-28 NOTE — TELEPHONE ENCOUNTER
----- Message from Ariane Church RN sent at 4/1/2022 10:22 AM CDT -----  Regarding: FW: schedule in lung clinic and move PET up to 4/11  Please contact him or his wife and make sure he is aware of his appt for PET and Lung clinic.     Thanks      ----- Message -----  From: Ariane Church RN  Sent: 3/30/2022   8:00 AM CDT  To: Ariane Church RN  Subject: schedule in lung clinic and move PET up to 4#           4 = No assist / stand by assistance

## 2025-04-09 ENCOUNTER — TELEPHONE (OUTPATIENT)
Dept: FAMILY MEDICINE | Facility: CLINIC | Age: 75
End: 2025-04-09
Payer: MEDICARE

## 2025-04-09 NOTE — TELEPHONE ENCOUNTER
----- Message from Vandana sent at 4/8/2025  6:38 PM CDT -----  Type:  Appointment Request Name of Caller:KAY MANZO JR. [[id number]]When is the first available appointment?No accessWould the patient rather a call back or a response via [facility]? Call back Best Call Back Number:[phone] Additional Information: Patient states he would like to reschedule appointment on 4/9. Patient would like a call back with further assistance.

## 2025-04-10 ENCOUNTER — TELEPHONE (OUTPATIENT)
Dept: FAMILY MEDICINE | Facility: CLINIC | Age: 75
End: 2025-04-10
Payer: MEDICARE

## 2025-04-10 NOTE — TELEPHONE ENCOUNTER
Called patient regarding refill request, no answer, left voicemail.     Patient needs an appointment scheduled, has not been seen by pcp in a year.

## 2025-04-10 NOTE — TELEPHONE ENCOUNTER
----- Message from Debra sent at 4/10/2025 12:12 PM CDT -----  Contact: [facility]  Type:  RX Refill RequestWho Called: Pharmacy Refill or New Rx:refillRX Name and Strength:EScitalopram oxalate (LEXAPRO) 20 MG tabletfurosemide (LASIX) 20 MG tabletrosuvastatin (CRESTOR) 40 MG Tabferrous sulfate (FEROSUL) 325 mg (65 mg iron) Tab tabletdiazePAM (VALIUM) 5 MG tabletHow is the patient currently taking it? (ex. 1XDay):As prescribed Is this a 30 day or 90 day RX:90Preferred Pharmacy with phone number:[facility] [place]  [address] [address]Tdzce: [phone] Fax: [phone] or Mail Order:MailOrdering Provider:Lindsey the patient rather a call back or a response via [facility]? [facility] Call Back Number:[phone] Additional Information: Please refill

## 2025-04-15 ENCOUNTER — TELEPHONE (OUTPATIENT)
Dept: FAMILY MEDICINE | Facility: CLINIC | Age: 75
End: 2025-04-15
Payer: MEDICARE

## 2025-04-15 NOTE — TELEPHONE ENCOUNTER
----- Message from Catie sent at 4/15/2025  9:26 AM CDT -----  Regarding: Appointment Reschedule  Patient is needing to get his appointment rescheduled.  He is also needing to have his medication refilled, diazePAM.  Could someone please call this patient so that he can be rescheduled and also have his medication refilled.

## 2025-04-25 RX ORDER — TELMISARTAN AND HYDROCHLOROTHIAZIDE 12.5; 8 MG/1; MG/1
1 TABLET ORAL DAILY
Qty: 90 TABLET | Refills: 0 | Status: SHIPPED | OUTPATIENT
Start: 2025-04-25

## 2025-04-25 NOTE — TELEPHONE ENCOUNTER
Care Due:                  Date            Visit Type   Department     Provider  --------------------------------------------------------------------------------                                EP -                              PRIMARY      [facility] FAMILY  Last Visit: 04-      CARE ([facility])   MEDICINE       Kalani Velázquez  Next Visit: None Scheduled  None         None Found                                                            Last  Test          Frequency    Reason                     Performed    Due Date  --------------------------------------------------------------------------------    Office Visit  15 months..  EScitalopram, atenoloL,    04-   07-                             finasteride, furosemide,                             montelukast,                             rosuvastatin, sildenafiL,                             telmisartan-hydrochloroth                             iazide...................    CMP.........  12 months..  furosemide, rosuvastatin,   04-   04-                             telmisartan-hydrochloroth                             iazide...................    Lipid Panel.  12 months..  rosuvastatin.............  04-   04-    [facility] Embedded Care Due Messages. Reference number: [id number].   4/25/2025 3:21:26 PM CDT

## 2025-04-25 NOTE — TELEPHONE ENCOUNTER
----- Message from Roni sent at 4/25/2025  3:09 PM CDT -----  Regarding: Refill  Type:  RX Refill RequestWho Called: [facility], Zay CRefill or New Rx:refill RX Name and Strength:telmisartan-hydrochlorothiazide (MICARDIS HCT) 80-12.5 mg per tabletHow is the patient currently taking it? (ex. 1XDay):as directed Is this a 30 day or 90 day RX:90Preferred Pharmacy with phone number:[facility], [place] - [address] [address]Cadvu: [phone] Fax: [phone] or Mail Order:mail Ordering Provider:Melania Would the patient rather a call back or a response via [facility]? [facility] Call Back Number:[phone] Additional Information:  please call to advise, Thank You.

## 2025-04-26 DIAGNOSIS — E61.1 IRON DEFICIENCY: ICD-10-CM

## 2025-04-28 ENCOUNTER — TELEPHONE (OUTPATIENT)
Dept: FAMILY MEDICINE | Facility: CLINIC | Age: 75
End: 2025-04-28
Payer: MEDICARE

## 2025-04-28 RX ORDER — FERROUS SULFATE 325(65) MG
325 TABLET ORAL DAILY
Qty: 90 TABLET | Refills: 0 | Status: SHIPPED | OUTPATIENT
Start: 2025-04-28

## 2025-04-28 NOTE — TELEPHONE ENCOUNTER
----- Message from Anabel sent at 4/28/2025 10:31 AM CDT -----  Type:  Sooner Appointment RequestCaller is requesting a sooner appointment.  Caller declined first available appointment listed below.  Caller will not accept being placed on the waitlist and is requesting a message be sent to doctor.Name of Caller:  our lady When is the first available appointment?  N/a Symptoms:  hosp f/u , 1 week Would the patient rather a call back or a response via [facility]? Call Best Call Back Number:  [phone] (home) [phone] (work)Additional Information:  please advise

## 2025-04-30 DIAGNOSIS — F41.9 ANXIETY: ICD-10-CM

## 2025-04-30 RX ORDER — ESCITALOPRAM OXALATE 20 MG/1
20 TABLET ORAL NIGHTLY
Qty: 90 TABLET | Refills: 0 | Status: SHIPPED | OUTPATIENT
Start: 2025-04-30

## 2025-04-30 NOTE — TELEPHONE ENCOUNTER
No care due was identified.  [facility] Embedded Care Due Messages. Reference number: [id number].   4/30/2025 11:09:58 AM CDT

## 2025-04-30 NOTE — TELEPHONE ENCOUNTER
Refill Decision Note   Alexis Mitchell  is requesting a refill authorization.  Brief Assessment and Rationale for Refill:  Approve     Medication Therapy Plan:        Comments:     Note composed:11:17 AM 04/30/2025

## 2025-05-06 NOTE — TELEPHONE ENCOUNTER
Called patient and left message on 7/10 & 7/11 to call back so we can reschedule his f/u with  on 7/19 since he will be out of the office.  
with patient

## 2025-05-12 ENCOUNTER — TELEPHONE (OUTPATIENT)
Dept: FAMILY MEDICINE | Facility: CLINIC | Age: 75
End: 2025-05-12
Payer: MEDICARE

## 2025-05-12 NOTE — TELEPHONE ENCOUNTER
----- Message from FatouKatlinnedov sent at 5/12/2025 10:45 AM CDT -----  Type: RX Refill RequestWho Called: [facility]- [facility] Refill or New RX: Refill RX Name and Strength:furosemide (LASIX) 20 MG tablet, rosuvastatin (CRESTOR) 40 MG Tab, ferrous sulfate (FEROSUL) 325 mg (65 mg iron) Tab tabletPreferred Pharmacy with Phone Number:[facility] ([place]) - [address] [address]Udden: [phone] Fax: [phone] Call Back Number: [phone] Information:

## 2025-05-22 DIAGNOSIS — E78.5 DYSLIPIDEMIA: ICD-10-CM

## 2025-05-22 DIAGNOSIS — I25.708 CORONARY ARTERY DISEASE OF BYPASS GRAFT OF NATIVE HEART WITH STABLE ANGINA PECTORIS: ICD-10-CM

## 2025-05-22 DIAGNOSIS — R60.0 LOCALIZED EDEMA: ICD-10-CM

## 2025-05-22 RX ORDER — ROSUVASTATIN CALCIUM 40 MG/1
40 TABLET, COATED ORAL NIGHTLY
Qty: 90 TABLET | Refills: 0 | Status: SHIPPED | OUTPATIENT
Start: 2025-05-22

## 2025-05-22 RX ORDER — FUROSEMIDE 20 MG/1
20 TABLET ORAL DAILY
Qty: 90 TABLET | Refills: 0 | Status: SHIPPED | OUTPATIENT
Start: 2025-05-22

## 2025-05-22 NOTE — TELEPHONE ENCOUNTER
No care due was identified.  [facility] Embedded Care Due Messages. Reference number: [id number].   5/22/2025 10:49:03 AM CDT

## 2025-05-22 NOTE — TELEPHONE ENCOUNTER
Refill Routing Note   Medication(s) are not appropriate for processing by [facility] for the following reason(s):        Required labs outdated    [facility] action(s):  Defer  Approve             Appointments  past 12m or future 3m with PCP    Date Provider   Last Visit   4/10/2024 Kalani Velázquez MD   Next Visit   Visit date not found Kalani Velázquez MD   ED visits in past 90 days: 0        Note composed:2:24 PM 05/22/2025

## 2025-06-02 ENCOUNTER — TELEPHONE (OUTPATIENT)
Dept: FAMILY MEDICINE | Facility: CLINIC | Age: 75
End: 2025-06-02
Payer: MEDICARE

## 2025-06-06 ENCOUNTER — TELEPHONE (OUTPATIENT)
Dept: FAMILY MEDICINE | Facility: CLINIC | Age: 75
End: 2025-06-06
Payer: MEDICARE

## 2025-06-17 ENCOUNTER — TELEPHONE (OUTPATIENT)
Dept: FAMILY MEDICINE | Facility: CLINIC | Age: 75
End: 2025-06-17
Payer: MEDICARE

## 2025-06-17 NOTE — TELEPHONE ENCOUNTER
Copied from CRM #[id number]. Topic: Medications - Medication Refill  >> Jun 17, 2025  3:29 PM Krystina wrote:  Type:  RX Refill Request    Who Called: Rosa delacruz/[facility]  Refill or New Rx:refill  RX Name and Strength:diazePAM (VALIUM) 5 MG tablet when he was discharged from [place] he was prescribed 1/2 tablet but he was taking a whole tablet so now he is out of this medication  HYDROcodone-acetaminophen  Lisinopril 40 mg daily  Preferred Pharmacy with phone number:    [facility] [address] [address]  Phone: [phone] Fax: [phone]    Local or Mail Order:local  Ordering Provider:Melania  Would the patient rather a call back or a response via [facility]? call  Best Call Back Number:Rosa   Additional Information: please advise thank you

## 2025-07-11 ENCOUNTER — TELEPHONE (OUTPATIENT)
Dept: FAMILY MEDICINE | Facility: CLINIC | Age: 75
End: 2025-07-11
Payer: MEDICARE

## 2025-07-11 NOTE — TELEPHONE ENCOUNTER
Copied from CRM #[id number]. Topic: Appointments - Appointment Access  >> Jul 11, 2025 10:58 AM Denisa wrote:  Type:  Needs Medical Advice    Who Called: self  Symptoms (please be specific): pt would like to make an annual appt to see  Needs medication refills as well.    Would the patient rather a call back or a response via [facility]? call  Best Call Back Number: [phone] (home) [phone] (work)    Additional Information: please advise and thank gia

## 2025-07-16 ENCOUNTER — OFFICE VISIT (OUTPATIENT)
Dept: FAMILY MEDICINE | Facility: CLINIC | Age: 75
End: 2025-07-16
Payer: MEDICARE

## 2025-07-16 VITALS
WEIGHT: 141.13 LBS | OXYGEN SATURATION: 95 % | HEART RATE: 57 BPM | TEMPERATURE: 98 F | SYSTOLIC BLOOD PRESSURE: 100 MMHG | HEIGHT: 64 IN | DIASTOLIC BLOOD PRESSURE: 68 MMHG | BODY MASS INDEX: 24.1 KG/M2

## 2025-07-16 DIAGNOSIS — R60.0 LOCALIZED EDEMA: ICD-10-CM

## 2025-07-16 DIAGNOSIS — I10 ESSENTIAL HYPERTENSION: Primary | ICD-10-CM

## 2025-07-16 DIAGNOSIS — I25.708 CORONARY ARTERY DISEASE OF BYPASS GRAFT OF NATIVE HEART WITH STABLE ANGINA PECTORIS: ICD-10-CM

## 2025-07-16 DIAGNOSIS — Z76.0 ENCOUNTER FOR MEDICATION REFILL: ICD-10-CM

## 2025-07-16 DIAGNOSIS — E78.5 DYSLIPIDEMIA: ICD-10-CM

## 2025-07-16 PROCEDURE — 99999 PR PBB SHADOW E&M-EST. PATIENT-LVL IV: CPT | Mod: PBBFAC,,, | Performed by: EMERGENCY MEDICINE

## 2025-07-16 PROCEDURE — 3288F FALL RISK ASSESSMENT DOCD: CPT | Mod: CPTII,S$GLB,, | Performed by: EMERGENCY MEDICINE

## 2025-07-16 PROCEDURE — 99213 OFFICE O/P EST LOW 20 MIN: CPT | Mod: S$GLB,,, | Performed by: EMERGENCY MEDICINE

## 2025-07-16 PROCEDURE — 1125F AMNT PAIN NOTED PAIN PRSNT: CPT | Mod: CPTII,S$GLB,, | Performed by: EMERGENCY MEDICINE

## 2025-07-16 PROCEDURE — 3078F DIAST BP <80 MM HG: CPT | Mod: CPTII,S$GLB,, | Performed by: EMERGENCY MEDICINE

## 2025-07-16 PROCEDURE — 1159F MED LIST DOCD IN RCRD: CPT | Mod: CPTII,S$GLB,, | Performed by: EMERGENCY MEDICINE

## 2025-07-16 PROCEDURE — 1101F PT FALLS ASSESS-DOCD LE1/YR: CPT | Mod: CPTII,S$GLB,, | Performed by: EMERGENCY MEDICINE

## 2025-07-16 PROCEDURE — 3074F SYST BP LT 130 MM HG: CPT | Mod: CPTII,S$GLB,, | Performed by: EMERGENCY MEDICINE

## 2025-07-16 RX ORDER — ROSUVASTATIN CALCIUM 40 MG/1
40 TABLET, COATED ORAL NIGHTLY
Qty: 90 TABLET | Refills: 0 | Status: SHIPPED | OUTPATIENT
Start: 2025-07-16

## 2025-07-16 RX ORDER — FUROSEMIDE 20 MG/1
20 TABLET ORAL DAILY
Qty: 90 TABLET | Refills: 0 | Status: SHIPPED | OUTPATIENT
Start: 2025-07-16

## 2025-07-16 RX ORDER — ATENOLOL 100 MG/1
100 TABLET ORAL DAILY
Qty: 90 TABLET | Refills: 3 | Status: SHIPPED | OUTPATIENT
Start: 2025-07-16

## 2025-07-16 RX ORDER — FINASTERIDE 5 MG/1
5 TABLET, FILM COATED ORAL DAILY
Qty: 90 TABLET | Refills: 1 | Status: SHIPPED | OUTPATIENT
Start: 2025-07-16

## 2025-07-16 NOTE — ASSESSMENT & PLAN NOTE
Orders:    rosuvastatin (CRESTOR) 40 MG Tab; Take 1 tablet (40 mg total) by mouth every evening.
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
## 2025-07-16 NOTE — ASSESSMENT & PLAN NOTE
Orders:    atenoloL (TENORMIN) 100 MG tablet; Take 1 tablet (100 mg total) by mouth once daily.

## 2025-07-30 ENCOUNTER — OFFICE VISIT (OUTPATIENT)
Dept: FAMILY MEDICINE | Facility: CLINIC | Age: 75
End: 2025-07-30
Payer: MEDICARE

## 2025-07-30 VITALS
WEIGHT: 138.88 LBS | SYSTOLIC BLOOD PRESSURE: 114 MMHG | HEART RATE: 110 BPM | DIASTOLIC BLOOD PRESSURE: 70 MMHG | BODY MASS INDEX: 23.71 KG/M2 | HEIGHT: 64 IN | OXYGEN SATURATION: 96 %

## 2025-07-30 DIAGNOSIS — D64.9 LOW HEMOGLOBIN: Primary | ICD-10-CM

## 2025-07-30 DIAGNOSIS — J43.1 PANLOBULAR EMPHYSEMA: ICD-10-CM

## 2025-07-30 DIAGNOSIS — F41.9 ANXIETY: ICD-10-CM

## 2025-07-30 DIAGNOSIS — M54.41 CHRONIC BILATERAL LOW BACK PAIN WITH BILATERAL SCIATICA: ICD-10-CM

## 2025-07-30 DIAGNOSIS — M54.42 CHRONIC BILATERAL LOW BACK PAIN WITH BILATERAL SCIATICA: ICD-10-CM

## 2025-07-30 DIAGNOSIS — Z98.890 S/P LUMBAR SPINE OPERATION: ICD-10-CM

## 2025-07-30 DIAGNOSIS — G89.29 CHRONIC BILATERAL LOW BACK PAIN WITH BILATERAL SCIATICA: ICD-10-CM

## 2025-07-30 DIAGNOSIS — I10 ESSENTIAL HYPERTENSION: ICD-10-CM

## 2025-07-30 DIAGNOSIS — R20.0 NUMBNESS: ICD-10-CM

## 2025-07-30 DIAGNOSIS — Z12.2 SCREENING FOR LUNG CANCER: ICD-10-CM

## 2025-07-30 DIAGNOSIS — Z87.891 PERSONAL HISTORY OF NICOTINE DEPENDENCE: ICD-10-CM

## 2025-07-30 DIAGNOSIS — F17.200 TOBACCO DEPENDENCE: ICD-10-CM

## 2025-07-30 PROCEDURE — 1159F MED LIST DOCD IN RCRD: CPT | Mod: CPTII,S$GLB,, | Performed by: FAMILY MEDICINE

## 2025-07-30 PROCEDURE — 3288F FALL RISK ASSESSMENT DOCD: CPT | Mod: CPTII,S$GLB,, | Performed by: FAMILY MEDICINE

## 2025-07-30 PROCEDURE — 3074F SYST BP LT 130 MM HG: CPT | Mod: CPTII,S$GLB,, | Performed by: FAMILY MEDICINE

## 2025-07-30 PROCEDURE — 1125F AMNT PAIN NOTED PAIN PRSNT: CPT | Mod: CPTII,S$GLB,, | Performed by: FAMILY MEDICINE

## 2025-07-30 PROCEDURE — 99214 OFFICE O/P EST MOD 30 MIN: CPT | Mod: S$GLB,,, | Performed by: FAMILY MEDICINE

## 2025-07-30 PROCEDURE — 1101F PT FALLS ASSESS-DOCD LE1/YR: CPT | Mod: CPTII,S$GLB,, | Performed by: FAMILY MEDICINE

## 2025-07-30 PROCEDURE — 3078F DIAST BP <80 MM HG: CPT | Mod: CPTII,S$GLB,, | Performed by: FAMILY MEDICINE

## 2025-07-30 PROCEDURE — 99999 PR PBB SHADOW E&M-EST. PATIENT-LVL IV: CPT | Mod: PBBFAC,,, | Performed by: FAMILY MEDICINE

## 2025-07-30 RX ORDER — DIAZEPAM 5 MG/1
5 TABLET ORAL NIGHTLY
Qty: 30 TABLET | Refills: 5 | Status: SHIPPED | OUTPATIENT
Start: 2025-07-30

## 2025-07-30 RX ORDER — BUDESONIDE AND FORMOTEROL FUMARATE DIHYDRATE 160; 4.5 UG/1; UG/1
2 AEROSOL RESPIRATORY (INHALATION) EVERY 12 HOURS
Qty: 10.2 G | Refills: 5 | Status: SHIPPED | OUTPATIENT
Start: 2025-07-30 | End: 2026-07-30

## 2025-07-31 ENCOUNTER — TELEPHONE (OUTPATIENT)
Dept: FAMILY MEDICINE | Facility: CLINIC | Age: 75
End: 2025-07-31
Payer: MEDICARE

## 2025-07-31 NOTE — TELEPHONE ENCOUNTER
Copied from CRM #[id number]. Topic: General Inquiry - Return Call  >> Jul 31, 2025 10:19 AM Mervin wrote:  Type:  Patient Returning Call    Who Called:patient  Who Left Message for Patient:nurse  Does the patient know what this is regarding?:Patient called pharmacy this morning Pharmacy is telling patient no orders were sent  Would the patient rather a call back or a response via [facility]? Please call patient to advise  Best Call Back Number:[phone]  Additional Information: Patient seen yesterday in office

## 2025-08-04 ENCOUNTER — TELEPHONE (OUTPATIENT)
Dept: FAMILY MEDICINE | Facility: CLINIC | Age: 75
End: 2025-08-04
Payer: MEDICARE

## 2025-08-04 RX ORDER — HYDROCODONE BITARTRATE AND ACETAMINOPHEN 5; 325 MG/1; MG/1
1 TABLET ORAL EVERY 8 HOURS PRN
Qty: 14 TABLET | Refills: 0 | Status: SHIPPED | OUTPATIENT
Start: 2025-08-04 | End: 2025-08-09

## 2025-08-04 NOTE — TELEPHONE ENCOUNTER
Copied from CRM #[id number]. Topic: Medications - Medication Refill  >> Aug 4, 2025  2:16 PM April wrote:  Type:  RX Refill Request    Who Called: Pt   Refill or New Rx: Refill   RX Name and Strength:hydrocodone   How is the patient currently taking it? (ex. 1XDay):NA  Is this a 30 day or 90 day RX:NA  Preferred Pharmacy with phone number:   [facility] [address] [address]  Phone: [phone] Fax: [phone]     Local or Mail Order:Local   Ordering Provider: Melania  Would the patient rather a call back or a response via [facility]? Call Back  Best Call Back Number: [phone]   Additional Information: Pt requesting a call back to get refill on Medication listed above

## 2025-08-04 NOTE — PROGRESS NOTES
Assessment:       1. Low hemoglobin    2. Anxiety    3. S/P lumbar spine operation    4. Numbness    5. Essential hypertension    6. Tobacco dependence    7. Screening for lung cancer    8. Personal history of nicotine dependence    9. Panlobular emphysema    10. Chronic bilateral low back pain with bilateral sciatica        Plan:       Low hemoglobin: Worsening  -     CBC Without Differential; Future; Expected date: 07/30/2025  -     Iron and TIBC; Future; Expected date: 07/30/2025  -     Ferritin; Future; Expected date: 07/30/2025    Anxiety:  Worsening  -     diazePAM (VALIUM) 5 MG tablet; Take 1 tablet (5 mg total) by mouth every evening.  Dispense: 30 tablet; Refill: 5    S/P lumbar spine operation: Worsening  -     diazePAM (VALIUM) 5 MG tablet; Take 1 tablet (5 mg total) by mouth every evening.  Dispense: 30 tablet; Refill: 5    Numbness: Stable  -     Vitamin B12; Future; Expected date: 07/30/2025  -     Folate; Future; Expected date: 07/30/2025    Essential hypertension: Stable  -     Comprehensive Metabolic Panel; Future; Expected date: 07/30/2025    Tobacco dependence: Worsening.  The patient was advised to quit tobacco, he is in the pre contemplation phase.    Screening for lung cancer  -     CT Chest Lung Screening Low Dose; Future; Expected date: 07/30/2025    Personal history of nicotine dependence  -     CT Chest Lung Screening Low Dose; Future; Expected date: 07/30/2025    Panlobular emphysema: Stable.  Recommend start patient on Symbicort.  -     budesonide-formoterol 160-4.5 mcg (SYMBICORT) 160-4.5 mcg/actuation HFAA; Inhale 2 puffs into the lungs every 12 (twelve) hours. Controller  Dispense: 10.2 g; Refill: 5    Chronic bilateral low back pain with bilateral sciatica: Worsening.  [place] prescription monitoring program was checked and okay, recommend the patient do not take the medication at the same time with a diazepam due to risk of side effects and a least 6 hours apart.  -      HYDROcodone-acetaminophen (NORCO) 5-325 mg per tablet; Take 1 tablet by mouth every 8 (eight) hours as needed for Pain.  Dispense: 14 tablet; Refill: 0           The patient's BMI has been recorded in the chart. The patient has been provided educational materials regarding the benefits of attaining and maintaining a normal weight. We will continue to address and follow this issue during follow up visits.   Patient agreed with assessment and plan. Patient verbalized understanding.     Subjective:       Patient ID: Alexis Medrano Jr. is a 75 y.o. male.    Chief Complaint: Follow-up and medication refill    HPI    The patient is coming here today for a follow-up visit and medication refill, the patient has chronic lower back pain and history of surgery on his lower back, the patient stated that over-the-counter medications are not helping for the pain and he is asking for prescription for pain medication.  He is still smoking cigarettes, the patient does not want to quit at this time.  He is due for lung cancer screening and he would like this to be ordered.  He has emphysema, denies any worsening symptoms of shortness for breath or worsening amount of sputum.  The last blood work showed presence of low hemoglobin, he complains of anxiety and numbness sensation.  He takes Valium for anxiety, he will need refills.    Past medical history, past social history was reviewed and discussed with the patient.    Review of Systems    Objective:      Physical Exam  Constitutional:       General: He is not in acute distress.     Appearance: Normal appearance. He is not toxic-appearing.      Comments: The patient has limited ambulation, he is using a cane for mobilization.   HENT:      Head: Normocephalic and atraumatic.   Cardiovascular:      Rate and Rhythm: Normal rate and regular rhythm.   Pulmonary:      Effort: Pulmonary effort is normal. No respiratory distress.      Breath sounds: No wheezing.   Neurological:      Mental  Status: He is alert.   Psychiatric:         Mood and Affect: Mood normal.         Behavior: Behavior normal.         Thought Content: Thought content normal.         Judgment: Judgment normal.

## 2025-08-15 ENCOUNTER — TELEPHONE (OUTPATIENT)
Dept: FAMILY MEDICINE | Facility: CLINIC | Age: 75
End: 2025-08-15
Payer: MEDICARE

## 2025-08-15 DIAGNOSIS — G40.909 SEIZURE DISORDER: Primary | ICD-10-CM

## 2025-08-16 RX ORDER — POTASSIUM CHLORIDE 750 MG/1
10 TABLET, EXTENDED RELEASE ORAL DAILY
Qty: 30 TABLET | Refills: 3 | Status: SHIPPED | OUTPATIENT
Start: 2025-08-16

## 2025-08-27 ENCOUNTER — TELEPHONE (OUTPATIENT)
Dept: FAMILY MEDICINE | Facility: CLINIC | Age: 75
End: 2025-08-27
Payer: MEDICARE